# Patient Record
Sex: FEMALE | Race: WHITE | NOT HISPANIC OR LATINO | Employment: FULL TIME | URBAN - METROPOLITAN AREA
[De-identification: names, ages, dates, MRNs, and addresses within clinical notes are randomized per-mention and may not be internally consistent; named-entity substitution may affect disease eponyms.]

---

## 2017-01-02 ENCOUNTER — APPOINTMENT (OUTPATIENT)
Dept: INTERVENTIONAL RADIOLOGY/VASCULAR | Facility: CLINIC | Age: 42
End: 2017-01-02
Attending: INTERNAL MEDICINE
Payer: COMMERCIAL

## 2017-01-02 PROCEDURE — C1769 GUIDE WIRE: HCPCS

## 2017-01-02 PROCEDURE — 36561 INSERT TUNNELED CV CATH: CPT

## 2017-01-02 PROCEDURE — 76937 US GUIDE VASCULAR ACCESS: CPT

## 2017-01-02 PROCEDURE — 77001 FLUOROGUIDE FOR VEIN DEVICE: CPT

## 2017-01-02 PROCEDURE — C1788 PORT, INDWELLING, IMP: HCPCS

## 2017-01-02 PROCEDURE — 27210904 ZZH KIT CR6

## 2017-01-02 PROCEDURE — 27210820 ZZH WOUND GLUE CR3

## 2017-01-03 ENCOUNTER — TRANSFERRED RECORDS (OUTPATIENT)
Dept: HEALTH INFORMATION MANAGEMENT | Facility: CLINIC | Age: 42
End: 2017-01-03

## 2017-01-04 ENCOUNTER — TRANSFERRED RECORDS (OUTPATIENT)
Dept: HEALTH INFORMATION MANAGEMENT | Facility: CLINIC | Age: 42
End: 2017-01-04

## 2017-01-09 ENCOUNTER — TRANSFERRED RECORDS (OUTPATIENT)
Dept: HEALTH INFORMATION MANAGEMENT | Facility: CLINIC | Age: 42
End: 2017-01-09

## 2017-01-16 ENCOUNTER — TRANSFERRED RECORDS (OUTPATIENT)
Dept: HEALTH INFORMATION MANAGEMENT | Facility: CLINIC | Age: 42
End: 2017-01-16

## 2017-01-18 ENCOUNTER — TRANSFERRED RECORDS (OUTPATIENT)
Dept: HEALTH INFORMATION MANAGEMENT | Facility: CLINIC | Age: 42
End: 2017-01-18

## 2017-01-18 ENCOUNTER — HOSPITAL ENCOUNTER (OUTPATIENT)
Dept: CARDIOLOGY | Facility: CLINIC | Age: 42
Discharge: HOME | End: 2017-01-18
Attending: NURSE PRACTITIONER | Admitting: NURSE PRACTITIONER
Payer: COMMERCIAL

## 2017-01-18 DIAGNOSIS — R00.2 PALPITATIONS: Primary | ICD-10-CM

## 2017-01-18 DIAGNOSIS — R00.2 PALPITATIONS: ICD-10-CM

## 2017-01-18 PROCEDURE — 93010 ELECTROCARDIOGRAM REPORT: CPT | Performed by: INTERNAL MEDICINE

## 2017-01-18 PROCEDURE — 93005 ELECTROCARDIOGRAM TRACING: CPT

## 2017-01-18 PROCEDURE — 93005 ELECTROCARDIOGRAM TRACING: CPT | Performed by: INTERNAL MEDICINE

## 2017-01-19 LAB — INTERPRETATION ECG - MUSE: NORMAL

## 2017-01-23 ENCOUNTER — TRANSFERRED RECORDS (OUTPATIENT)
Dept: HEALTH INFORMATION MANAGEMENT | Facility: CLINIC | Age: 42
End: 2017-01-23

## 2017-01-30 ENCOUNTER — TRANSFERRED RECORDS (OUTPATIENT)
Dept: HEALTH INFORMATION MANAGEMENT | Facility: CLINIC | Age: 42
End: 2017-01-30

## 2017-02-06 ENCOUNTER — TRANSFERRED RECORDS (OUTPATIENT)
Dept: HEALTH INFORMATION MANAGEMENT | Facility: CLINIC | Age: 42
End: 2017-02-06

## 2017-02-11 ENCOUNTER — TRANSFERRED RECORDS (OUTPATIENT)
Dept: HEALTH INFORMATION MANAGEMENT | Facility: CLINIC | Age: 42
End: 2017-02-11

## 2017-02-13 ENCOUNTER — TRANSFERRED RECORDS (OUTPATIENT)
Dept: HEALTH INFORMATION MANAGEMENT | Facility: CLINIC | Age: 42
End: 2017-02-13

## 2017-02-27 ENCOUNTER — TRANSFERRED RECORDS (OUTPATIENT)
Dept: HEALTH INFORMATION MANAGEMENT | Facility: CLINIC | Age: 42
End: 2017-02-27

## 2017-03-08 ENCOUNTER — MEDICAL CORRESPONDENCE (OUTPATIENT)
Dept: HEALTH INFORMATION MANAGEMENT | Facility: CLINIC | Age: 42
End: 2017-03-08

## 2017-03-08 ENCOUNTER — TRANSFERRED RECORDS (OUTPATIENT)
Dept: HEALTH INFORMATION MANAGEMENT | Facility: CLINIC | Age: 42
End: 2017-03-08

## 2017-03-10 ENCOUNTER — TRANSFERRED RECORDS (OUTPATIENT)
Dept: HEALTH INFORMATION MANAGEMENT | Facility: CLINIC | Age: 42
End: 2017-03-10

## 2017-03-10 DIAGNOSIS — C20 RECTAL CANCER (H): Primary | ICD-10-CM

## 2017-03-15 ENCOUNTER — OFFICE VISIT (OUTPATIENT)
Dept: INTERNAL MEDICINE | Facility: CLINIC | Age: 42
End: 2017-03-15
Payer: COMMERCIAL

## 2017-03-15 VITALS
BODY MASS INDEX: 43.37 KG/M2 | WEIGHT: 269.9 LBS | HEIGHT: 66 IN | TEMPERATURE: 98.8 F | OXYGEN SATURATION: 99 % | HEART RATE: 98 BPM | SYSTOLIC BLOOD PRESSURE: 130 MMHG | DIASTOLIC BLOOD PRESSURE: 80 MMHG

## 2017-03-15 DIAGNOSIS — J45.20 MILD INTERMITTENT ASTHMA WITHOUT COMPLICATION: ICD-10-CM

## 2017-03-15 DIAGNOSIS — C20 MALIGNANT NEOPLASM OF RECTUM (H): ICD-10-CM

## 2017-03-15 DIAGNOSIS — F33.0 MAJOR DEPRESSIVE DISORDER, RECURRENT EPISODE, MILD (H): ICD-10-CM

## 2017-03-15 DIAGNOSIS — Z01.818 PREOP GENERAL PHYSICAL EXAM: Primary | ICD-10-CM

## 2017-03-15 LAB
APTT PPP: 24 SEC (ref 22–37)
HBA1C MFR BLD: 5.1 % (ref 4.3–6)
HGB BLD-MCNC: 11.7 G/DL (ref 11.7–15.7)
INR PPP: 0.9 (ref 0.86–1.14)
PLATELET # BLD AUTO: 322 10E9/L (ref 150–450)

## 2017-03-15 PROCEDURE — 83036 HEMOGLOBIN GLYCOSYLATED A1C: CPT | Performed by: INTERNAL MEDICINE

## 2017-03-15 PROCEDURE — 85730 THROMBOPLASTIN TIME PARTIAL: CPT | Performed by: INTERNAL MEDICINE

## 2017-03-15 PROCEDURE — 85610 PROTHROMBIN TIME: CPT | Performed by: INTERNAL MEDICINE

## 2017-03-15 PROCEDURE — 84132 ASSAY OF SERUM POTASSIUM: CPT | Performed by: INTERNAL MEDICINE

## 2017-03-15 PROCEDURE — 99215 OFFICE O/P EST HI 40 MIN: CPT | Performed by: INTERNAL MEDICINE

## 2017-03-15 PROCEDURE — 85018 HEMOGLOBIN: CPT | Performed by: INTERNAL MEDICINE

## 2017-03-15 PROCEDURE — 36415 COLL VENOUS BLD VENIPUNCTURE: CPT | Performed by: INTERNAL MEDICINE

## 2017-03-15 PROCEDURE — 82947 ASSAY GLUCOSE BLOOD QUANT: CPT | Performed by: INTERNAL MEDICINE

## 2017-03-15 PROCEDURE — 85049 AUTOMATED PLATELET COUNT: CPT | Performed by: INTERNAL MEDICINE

## 2017-03-15 NOTE — PROGRESS NOTES
[facility]  [address] [address]  [phone]  Dept: [phone]    PRE-OP EVALUATION:  Today's date: 3/15/2017    Yahaira Ramirez (: 1975) presents for pre-operative evaluation assessment as requested by Dr. Adkins.  She requires evaluation and anesthesia risk assessment prior to undergoing surgery/procedure for treatment of rectal carcinoma .  Proposed procedure: Davinci repair    Date of Surgery/ Procedure: 17  Time of Surgery/ Procedure: 7:30 am  Hospital/Surgical Facility: [facility]  Primary Physician: Guille Torre  Type of Anesthesia Anticipated: General    Patient has a Health Care Directive or Living Will:  NO    HPI:                                                      Brief HPI related to upcoming procedure: treatment of rectal carcinoma .  Proposed procedure: Davinci repair    Yahaira Ramirez is a 41 year old female here for preoperative assessment for treatment of rectal carcinoma .  Proposed procedure: Davinci repair    See problem list for active medical problems.  Problems all longstanding and stable, except as noted/documented.  See ROS for pertinent symptoms related to these conditions.                                                                                                  .    MEDICAL HISTORY:                                                      Patient Active Problem List    Diagnosis Date Noted     CARDIOVASCULAR SCREENING; LDL GOAL LESS THAN 160 10/31/2010     Priority: High     Mirena inserted : due for removal  or 2016     Priority: Medium     Lot # [id number]  Exp : 2018  NDC: 29258-080-59  Virginia Baezakulwant CMA         Hypothyroidism, unspecified hypothyroidism type 2016     Priority: Medium     Mild intermittent asthma without complication 2016     Priority: Medium     Major depressive disorder, recurrent episode, mild (H) 2016     Priority: Medium     Hypertrophy of breast  08/01/2006     Priority: Medium      Past Medical History   Diagnosis Date     ASCUS on Pap smear 1/23/03     + HPV     Asthma      ASTHMA - MILD INTERMITTENT 8/23/2005     Calculus of gallbladder without mention of cholecystitis or obstruction      CARDIOVASCULAR SCREENING; LDL GOAL LESS THAN 160 10/31/2010     History of colposcopy with cervical biopsy 2/20/03, 7/23/03     SIMONE I & III, SIMONE I     HUMAN PAPILLOMAVIRUS NOS 3/24/2003     61-low risk     HYPOTHYROIDISM NOS 9/10/2002     Mild major depression (H) 2/22/2011     Other postablative hypothyroidism      iatrogenically induced hypothyroidism after I-31 for hyperthyroidism   abstracted [id number]     Renal stones      HX of     Past Surgical History   Procedure Laterality Date     Hc cautery of cervix, cryocautery  3/23/03     Cholecystectomy       Excise mass upper extremity  12/21/2012     Procedure: EXCISE MASS UPPER EXTREMITY;  Excision Subcutaneous Mass Right Tricep;  Surgeon: Mehul Maldonado MD;  Location: [facility] OR     Mammoplasty reduction bilateral  7/3/2014     Procedure: MAMMOPLASTY REDUCTION BILATERAL;  Surgeon: Yoanna Mccabe MD;  Location:  [facility]     C iud,mirena  4/2016     Ent surgery  03/2016     Tonsilectomy     Colonoscopy Left 12/9/2016     Procedure: COMBINED COLONOSCOPY, SINGLE OR MULTIPLE BIOPSY/POLYPECTOMY BY BIOPSY;  Surgeon: Claudette De La Paz MD;  Location:  GI     Current Outpatient Prescriptions   Medication Sig Dispense Refill     EPIDUO 0.1-2.5 % gel APPLY TO AFFECTED AREA NIGHTLY AT BEDTIME.  4     buPROPion (WELLBUTRIN SR) 150 MG 12 hr tablet Take 1 tablet (150 mg) by mouth 2 times daily 180 tablet 3     levothyroxine (SYNTHROID, LEVOTHROID) 125 MCG tablet Take 1 tablet (125 mcg) by mouth daily Then on day 7 take 1 1/2 tablets. 90 tablet 3     mometasone-formoterol (DULERA) 100-5 MCG/ACT oral inhaler Inhale 2 puffs into the lungs 2 times daily 1 Inhaler 11     citalopram (CELEXA) 40 MG tablet Take 1 tablet (40 mg) by  "mouth daily 90 tablet 3     fluticasone (FLONASE) 50 MCG/ACT nasal spray Spray 1-2 sprays into both nostrils daily 30 g 5     IBUPROFEN PO Take 400 mg by mouth as needed.       levalbuterol (XOPENEX HFA) 45 MCG/ACT inhaler Inhale 1-2 puffs into the lungs every 4 hours as needed for shortness of breath / dyspnea. 1 Inhaler 3     OTC products: None, except as noted above    Allergies   Allergen Reactions     Cats      Pollen Extract      Sulfa Drugs Hives      Latex Allergy: NO    Social History   Substance Use Topics     Smoking status: Former Smoker     Quit date: 12/7/2006     Smokeless tobacco: Never Used     Alcohol use No      Comment: 1-2 drinks every 3 months     History   Drug Use No       REVIEW OF SYSTEMS:                                                    C: NEGATIVE for fever, chills, change in weight  E/M: NEGATIVE for ear, mouth and throat problems  R: NEGATIVE for significant cough or SOB  CV: NEGATIVE for chest pain, palpitations or peripheral edema  GI: NEGATIVE for nausea, abdominal pain, heartburn, or change in bowel habits  : NEGATIVE for frequency, dysuria, or hematuria  M: NEGATIVE for significant arthralgias or myalgia  N: NEGATIVE for weakness, dizziness or paresthesias  H: NEGATIVE for bleeding problems  P: NEGATIVE for changes in mood or affect    EXAM:                                                    /80  Pulse 98  Temp 98.8  F (37.1  C) (Oral)  Ht 5' 6\" (1.676 m)  Wt 269 lb 14.4 oz (122.4 kg)  SpO2 99%  BMI 43.56 kg/m2    GENERAL APPEARANCE: healthy, alert and no distress     EYES: EOMI, PERRL     HENT: ear canals and TM's normal and nose and mouth without ulcers or lesions     NECK: no adenopathy, no asymmetry, masses, or scars and thyroid normal to palpation     RESP: lungs clear to auscultation - no rales, rhonchi or wheezes     CV: regular rates and rhythm, normal S1 S2, no S3 or S4 and no murmur, click or rub     ABDOMEN:  Obese, soft, nontender, no HSM or masses and bowel " sounds normal, obese     PSYCH: mentation appears normal and affect normal/bright    DIAGNOSTICS:                                                      EKG: Not repeated, see copy 1/18/2017, due to non-vascular surgery and age <65 and without cardiac risk factors  Labs Drawn and in Process:   Unresulted Labs Ordered in the Past 30 Days of this Admission     No orders found from 1/14/2017 to 3/16/2017.          Recent Labs   Lab Test  01/02/17   0827  10/28/16   0910  04/01/16   1406   HGB  12.1  13.1  12.9   PLT  391  365  421   INR  0.89   --    --    NA   --   139  141   POTASSIUM   --   4.6  4.2   CR   --   0.73  0.76        IMPRESSION:                                                    Reason for surgery/procedure: treatment of rectal carcinoma .  Proposed procedure: Davinci repair    The proposed surgical procedure is considered mild risk.    REVISED CARDIAC RISK INDEX  The patient has the following serious cardiovascular risks for perioperative complications such as (MI, PE, VFib and 3  AV Block):  No serious cardiac risks  INTERPRETATION: 1 risks: Class II (low risk - 0.9% complication rate)    The patient has the following additional risks for perioperative complications:  No identified additional risks      ICD-10-CM    1. Preop general physical exam Z01.818 Potassium     Glucose     Hemoglobin     Platelet count     INR     Partial thromboplastin time     Hemoglobin A1c   2. Malignant neoplasm of rectum (H) C20    3. Mild intermittent asthma without complication J45.20    4. Major depressive disorder, recurrent episode, mild (H) F33.0      (Z01.818) Preop general physical exam  (primary encounter diagnosis)  Comment: as ordered  Plan: Potassium, Glucose, Hemoglobin, Platelet count,        INR, Partial thromboplastin time, Hemoglobin         A1c            (C20) Malignant neoplasm of rectum (H)  Comment: surgery as scheduled  Plan:     (J45.20) Mild intermittent asthma without complication  Comment: stable  per ACT  Plan:     (F33.0) Major depressive disorder, recurrent episode, mild (H)  Comment: stable per PHQ9  Plan:         RECOMMENDATIONS:                                                      --Consult hospital rounder / IM to assist post-op medical management    Pulmonary Risk  Incentive spirometry post op  Maximize Asthma treatment inhalers as ordered     --Patient is to take all scheduled medications on the day of surgery EXCEPT for modifications listed below.    Anticoagulant or Antiplatelet Medication Use  ASPIRIN/NSAIDS: Discontinue 7-10 days prior to procedure to reduce bleeding risk.  It should be resumed post-operatively.      APPROVAL GIVEN to proceed with proposed procedure, without further diagnostic evaluation     Signed Electronically by: Guille Torre MD    Copy of this evaluation report is provided to requesting physician, Dr. Lucille Vicente Preop Guidelines

## 2017-03-15 NOTE — NURSING NOTE
"Chief Complaint   Patient presents with     Pre-Op Exam       Initial /90 (BP Location: Left arm, Patient Position: Chair, Cuff Size: Adult Large)  Pulse 106  Temp 98.8  F (37.1  C) (Oral)  Ht 5' 6\" (1.676 m)  Wt 269 lb 14.4 oz (122.4 kg)  SpO2 99%  BMI 43.56 kg/m2 Estimated body mass index is 43.56 kg/(m^2) as calculated from the following:    Height as of this encounter: 5' 6\" (1.676 m).    Weight as of this encounter: 269 lb 14.4 oz (122.4 kg).  Medication Reconciliation: complete   Estefany Erickson CMA      "

## 2017-03-16 ASSESSMENT — PATIENT HEALTH QUESTIONNAIRE - PHQ9: SUM OF ALL RESPONSES TO PHQ QUESTIONS 1-9: 2

## 2017-03-16 ASSESSMENT — ASTHMA QUESTIONNAIRES: ACT_TOTALSCORE: 25

## 2017-03-17 LAB
GLUCOSE SERPL-MCNC: 78 MG/DL (ref 70–99)
POTASSIUM SERPL-SCNC: 4.1 MMOL/L (ref 3.4–5.3)

## 2017-03-20 ENCOUNTER — TELEPHONE (OUTPATIENT)
Dept: WOUND CARE | Facility: CLINIC | Age: 42
End: 2017-03-20

## 2017-03-21 ENCOUNTER — SURGERY (OUTPATIENT)
Age: 42
End: 2017-03-21

## 2017-03-21 ENCOUNTER — HOSPITAL ENCOUNTER (OUTPATIENT)
Facility: CLINIC | Age: 42
Discharge: HOME | End: 2017-03-21
Attending: COLON & RECTAL SURGERY | Admitting: COLON & RECTAL SURGERY
Payer: COMMERCIAL

## 2017-03-21 VITALS
SYSTOLIC BLOOD PRESSURE: 135 MMHG | OXYGEN SATURATION: 96 % | DIASTOLIC BLOOD PRESSURE: 86 MMHG | RESPIRATION RATE: 16 BRPM

## 2017-03-21 LAB — FLEXIBLE SIGMOIDOSCOPY: NORMAL

## 2017-03-21 PROCEDURE — 45330 DIAGNOSTIC SIGMOIDOSCOPY: CPT | Performed by: COLON & RECTAL SURGERY

## 2017-03-21 RX ORDER — NALOXONE HYDROCHLORIDE 0.4 MG/ML
.1-.4 INJECTION, SOLUTION INTRAMUSCULAR; INTRAVENOUS; SUBCUTANEOUS
Status: DISCONTINUED | OUTPATIENT
Start: 2017-03-21 | End: 2017-03-21 | Stop reason: HOSPADM

## 2017-03-21 RX ORDER — DIPHENHYDRAMINE HCL 25 MG
25 CAPSULE ORAL EVERY 4 HOURS PRN
Status: DISCONTINUED | OUTPATIENT
Start: 2017-03-21 | End: 2017-03-21 | Stop reason: HOSPADM

## 2017-03-21 RX ORDER — DIPHENHYDRAMINE HYDROCHLORIDE 50 MG/ML
25 INJECTION INTRAMUSCULAR; INTRAVENOUS EVERY 4 HOURS PRN
Status: DISCONTINUED | OUTPATIENT
Start: 2017-03-21 | End: 2017-03-21 | Stop reason: HOSPADM

## 2017-03-21 RX ORDER — PROCHLORPERAZINE MALEATE 5 MG
5-10 TABLET ORAL EVERY 6 HOURS PRN
Status: DISCONTINUED | OUTPATIENT
Start: 2017-03-21 | End: 2017-03-21 | Stop reason: HOSPADM

## 2017-03-21 RX ORDER — ONDANSETRON 2 MG/ML
4 INJECTION INTRAMUSCULAR; INTRAVENOUS EVERY 6 HOURS PRN
Status: DISCONTINUED | OUTPATIENT
Start: 2017-03-21 | End: 2017-03-21 | Stop reason: HOSPADM

## 2017-03-21 RX ORDER — ONDANSETRON 2 MG/ML
4 INJECTION INTRAMUSCULAR; INTRAVENOUS
Status: DISCONTINUED | OUTPATIENT
Start: 2017-03-21 | End: 2017-03-21 | Stop reason: HOSPADM

## 2017-03-21 RX ORDER — LIDOCAINE 40 MG/G
CREAM TOPICAL
Status: DISCONTINUED | OUTPATIENT
Start: 2017-03-21 | End: 2017-03-21 | Stop reason: HOSPADM

## 2017-03-21 RX ORDER — FLUMAZENIL 0.1 MG/ML
0.2 INJECTION, SOLUTION INTRAVENOUS
Status: DISCONTINUED | OUTPATIENT
Start: 2017-03-21 | End: 2017-03-21 | Stop reason: HOSPADM

## 2017-03-21 RX ORDER — ONDANSETRON 4 MG/1
4 TABLET, ORALLY DISINTEGRATING ORAL EVERY 6 HOURS PRN
Status: DISCONTINUED | OUTPATIENT
Start: 2017-03-21 | End: 2017-03-21 | Stop reason: HOSPADM

## 2017-03-27 ENCOUNTER — OFFICE VISIT (OUTPATIENT)
Dept: FAMILY MEDICINE | Facility: CLINIC | Age: 42
End: 2017-03-27
Payer: COMMERCIAL

## 2017-03-27 VITALS
HEIGHT: 66 IN | WEIGHT: 272 LBS | TEMPERATURE: 99 F | DIASTOLIC BLOOD PRESSURE: 84 MMHG | SYSTOLIC BLOOD PRESSURE: 126 MMHG | HEART RATE: 87 BPM | OXYGEN SATURATION: 97 % | BODY MASS INDEX: 43.71 KG/M2

## 2017-03-27 DIAGNOSIS — E03.9 HYPOTHYROIDISM, UNSPECIFIED TYPE: Primary | ICD-10-CM

## 2017-03-27 DIAGNOSIS — J01.90 ACUTE SINUSITIS WITH SYMPTOMS > 10 DAYS: ICD-10-CM

## 2017-03-27 DIAGNOSIS — F33.0 MAJOR DEPRESSIVE DISORDER, RECURRENT EPISODE, MILD (H): ICD-10-CM

## 2017-03-27 PROCEDURE — 99214 OFFICE O/P EST MOD 30 MIN: CPT | Performed by: FAMILY MEDICINE

## 2017-03-27 RX ORDER — CITALOPRAM HYDROBROMIDE 40 MG/1
40 TABLET ORAL DAILY
Qty: 30 TABLET | Refills: 0 | Status: SHIPPED | OUTPATIENT
Start: 2017-03-27 | End: 2017-04-18

## 2017-03-27 RX ORDER — FLUTICASONE PROPIONATE 50 MCG
2 SPRAY, SUSPENSION (ML) NASAL DAILY
Qty: 16 G | Refills: 0 | Status: ON HOLD | OUTPATIENT
Start: 2017-03-27 | End: 2017-04-05

## 2017-03-27 RX ORDER — LEVOTHYROXINE SODIUM 125 UG/1
125 TABLET ORAL DAILY
Qty: 45 TABLET | Refills: 0 | Status: SHIPPED | OUTPATIENT
Start: 2017-03-27 | End: 2017-03-29

## 2017-03-27 ASSESSMENT — ANXIETY QUESTIONNAIRES
3. WORRYING TOO MUCH ABOUT DIFFERENT THINGS: NOT AT ALL
5. BEING SO RESTLESS THAT IT IS HARD TO SIT STILL: NOT AT ALL
IF YOU CHECKED OFF ANY PROBLEMS ON THIS QUESTIONNAIRE, HOW DIFFICULT HAVE THESE PROBLEMS MADE IT FOR YOU TO DO YOUR WORK, TAKE CARE OF THINGS AT HOME, OR GET ALONG WITH OTHER PEOPLE: NOT DIFFICULT AT ALL
1. FEELING NERVOUS, ANXIOUS, OR ON EDGE: SEVERAL DAYS
7. FEELING AFRAID AS IF SOMETHING AWFUL MIGHT HAPPEN: NOT AT ALL
2. NOT BEING ABLE TO STOP OR CONTROL WORRYING: NOT AT ALL
6. BECOMING EASILY ANNOYED OR IRRITABLE: NOT AT ALL
GAD7 TOTAL SCORE: 1

## 2017-03-27 ASSESSMENT — PATIENT HEALTH QUESTIONNAIRE - PHQ9: 5. POOR APPETITE OR OVEREATING: NOT AT ALL

## 2017-03-27 NOTE — PROGRESS NOTES
SUBJECTIVE:                                                    Yahaira Ramirez is a 41 year old female who presents to clinic today for the following health issues:      Acute Illness   Acute illness concerns: Cough  Onset: 7-10 days    Fever: no     Chills/Sweats: no     Headache (location?): no     Sinus Pressure:YES    Conjunctivitis:  no    Ear Pain: no    Rhinorrhea: YES    Congestion: YES    Sore Throat: no      Cough: YES    Wheeze: YES    Decreased Appetite: no     Nausea: no     Vomiting: no     Diarrhea:  no     Dysuria/Freq.: no     Fatigue/Achiness: YES    Sick/Strep Exposure: no      Therapies Tried and outcome: Tylenol        Asthma Follow-Up    Was ACT completed today?    Yes    ACT Total Scores 3/15/2017   ACT TOTAL SCORE (Goal Greater than or Equal to 20) 25   In the past 12 months, how many times did you visit the emergency room for your asthma without being admitted to the hospital? 0   In the past 12 months, how many times were you hospitalized overnight because of your asthma? 0       Patient has history of depression. Symptoms are well controlled. Needs refill on Celexa.  Also has history of hypothyroidism. Denies any symptoms. Needs a refill on Synthroid.    Patient has an upcoming colon cancer surgery next week. She is worried that in the past she has had bronchitis after a sinus infection.    Problem list and histories reviewed & adjusted, as indicated.  Additional history: as documented    Patient Active Problem List   Diagnosis     Hypertrophy of breast     CARDIOVASCULAR SCREENING; LDL GOAL LESS THAN 160     Hypothyroidism, unspecified hypothyroidism type     Mild intermittent asthma without complication     Major depressive disorder, recurrent episode, mild (H)     Mirena inserted : due for removal 2019 or 2021     Past Surgical History:   Procedure Laterality Date     C IUD,MIRENA  4/2016     CHOLECYSTECTOMY       COLONOSCOPY Left 12/9/2016    Procedure: COMBINED COLONOSCOPY, SINGLE  OR MULTIPLE BIOPSY/POLYPECTOMY BY BIOPSY;  Surgeon: Claudette De La Paz MD;  Location:  GI     ENT SURGERY  2016    Tonsilectomy     EXCISE MASS UPPER EXTREMITY  2012    Procedure: EXCISE MASS UPPER EXTREMITY;  Excision Subcutaneous Mass Right Tricep;  Surgeon: Mehul Maldonado MD;  Location:  OR     EYE SURGERY      Lasik     HC CAUTERY OF CERVIX, CRYOCAUTERY  3/23/03     MAMMOPLASTY REDUCTION BILATERAL  7/3/2014    Procedure: MAMMOPLASTY REDUCTION BILATERAL;  Surgeon: Yoanna Mccabe MD;  [facility]:  [facility]     SIGMOIDOSCOPY FLEXIBLE N/A 3/21/2017    Procedure: SIGMOIDOSCOPY FLEXIBLE;  Surgeon: Yair Adkins MD;  Location:  GI       Social History   Substance Use Topics     Smoking status: Former Smoker     Packs/day: 1.00     Years: 10.00     Types: Cigarettes     Quit date: 2006     Smokeless tobacco: Never Used     Alcohol use No      Comment: 1-2 drinks every 3 months     Family History   Problem Relation Age of Onset     CANCER Maternal Grandfather      -lung     CANCER Paternal Grandfather           Hypertension Father      DIABETES Father 60     type 2     Lipids Maternal Grandmother      CANCER Paternal Grandmother      pancreatic     Hypertension Mother      Colon Cancer No family hx of          Current Outpatient Prescriptions   Medication Sig Dispense Refill     levothyroxine (SYNTHROID/LEVOTHROID) 125 MCG tablet Take 1 tablet (125 mcg) by mouth daily Then on day 7 take 1 1/2 tablets. 45 tablet 0     citalopram (CELEXA) 40 MG tablet Take 1 tablet (40 mg) by mouth daily 30 tablet 0     amoxicillin-clavulanate (AUGMENTIN) 875-125 MG per tablet Take 1 tablet by mouth 2 times daily for 7 days 14 tablet 0     fluticasone (FLONASE) 50 MCG/ACT spray Spray 2 sprays into both nostrils daily 16 g 0     buPROPion (WELLBUTRIN SR) 150 MG 12 hr tablet Take 1 tablet (150 mg) by mouth 2 times daily 180 tablet 3     mometasone-formoterol (DULERA) 100-5 MCG/ACT oral inhaler  "Inhale 2 puffs into the lungs 2 times daily 1 Inhaler 11     levalbuterol (XOPENEX HFA) 45 MCG/ACT inhaler Inhale 1-2 puffs into the lungs every 4 hours as needed for shortness of breath / dyspnea. 1 Inhaler 3     [DISCONTINUED] levothyroxine (SYNTHROID, LEVOTHROID) 125 MCG tablet Take 1 tablet (125 mcg) by mouth daily Then on day 7 take 1 1/2 tablets. 90 tablet 3     [DISCONTINUED] citalopram (CELEXA) 40 MG tablet Take 1 tablet (40 mg) by mouth daily 90 tablet 3     Allergies   Allergen Reactions     Cats      Pollen Extract      Sulfa Drugs Hives       Reviewed and updated as needed this visit by clinical staff       Reviewed and updated as needed this visit by Provider         ROS:  C: NEGATIVE for fever, chills, change in weight  INTEGUMENTARY/SKIN: NEGATIVE for worrisome rashes, moles or lesions      OBJECTIVE:                                                    /84 (BP Location: Left arm, Patient Position: Chair, Cuff Size: Adult Large)  Pulse 87  Temp 99  F (37.2  C) (Tympanic)  Ht 5' 6\" (1.676 m)  Wt 272 lb (123.4 kg)  SpO2 97%  BMI 43.9 kg/m2  Body mass index is 43.9 kg/(m^2).   GENERAL: healthy, alert, well nourished, well hydrated, no distress  EYES: Eyes grossly normal to inspection, extraocular movements - intact, and PERRL  HENT: ear canals- normal; TMs- normal; Nose- congested b/l . B/l sinus tenderness.  Mouth- no ulcers, no lesions  NECK: no tenderness, no adenopathy, no asymmetry, no masses, no stiffness; thyroid- normal to palpation  RESP: lungs clear to auscultation - no rales, no rhonchi, no wheezes  CV: regular rates and rhythm, normal S1 S2, no S3 or S4 and no murmur, no click or rub -         ASSESSMENT/PLAN:                                                      1. Major depressive disorder, recurrent episode, mild (H)  Well controlled. Refilled medication for 30 days. Instructed to contact PCP for further refills  - citalopram (CELEXA) 40 MG tablet; Take 1 tablet (40 mg) by mouth " daily  Dispense: 30 tablet; Refill: 0    2. Hypothyroidism, unspecified type  Questionable control. Refilled the medication for 30 days. Instructed the patient to contact PCP for further refills  - levothyroxine (SYNTHROID/LEVOTHROID) 125 MCG tablet; Take 1 tablet (125 mcg) by mouth daily Then on day 7 take 1 1/2 tablets.  Dispense: 45 tablet; Refill: 0    3. Acute sinusitis with symptoms > 10 days    Starting the patient on Augmentin for acute sinus infection. Recommended to use Flonase  Stay well hydrated. May use Tylenol for pain if needed. If symptoms are worsening or no improvement noted, instructed to notify PCP and surgeon.   - amoxicillin-clavulanate (AUGMENTIN) 875-125 MG per tablet; Take 1 tablet by mouth 2 times daily for 7 days  Dispense: 14 tablet; Refill: 0  - fluticasone (FLONASE) 50 MCG/ACT spray; Spray 2 sprays into both nostrils daily  Dispense: 16 g; Refill: 0    4- Asthma - well controlled.       Follow up with Provider - as needed     Marty Osei MD  [facility]

## 2017-03-27 NOTE — NURSING NOTE
"Chief Complaint   Patient presents with     Cough       Initial /84 (BP Location: Left arm, Patient Position: Chair, Cuff Size: Adult Large)  Pulse 87  Temp 99  F (37.2  C) (Tympanic)  Ht 5' 6\" (1.676 m)  Wt 272 lb (123.4 kg)  SpO2 97%  BMI 43.9 kg/m2 Estimated body mass index is 43.9 kg/(m^2) as calculated from the following:    Height as of this encounter: 5' 6\" (1.676 m).    Weight as of this encounter: 272 lb (123.4 kg).  Medication Reconciliation: complete  "

## 2017-03-27 NOTE — MR AVS SNAPSHOT
After Visit Summary   3/27/2017    Yahaira Ramirez    MRN: [id number]           Patient Information     Date Of Birth          1975        Visit Information        Provider Department      3/27/2017 11:00 AM Marty Osei MD [facility] [place]        Today's Diagnoses     Hypothyroidism, unspecified type    -  1    Major depressive disorder, recurrent episode, mild (H)        Acute sinusitis with symptoms > 10 days           Follow-ups after your visit        Your next 10 appointments already scheduled     Mar 31, 2017  8:00 AM CDT   Consult HOD with Sh Ostomy Wound Room 743, RN   [facility] ([facility])    [address]  [facility] [address] [phone]            Apr 03, 2017 10:45 AM CDT   MA SCREENING DIGITAL BILATERAL with [id number]   [facility] ([facility])    [address] [facility], [address]  [facility] [address] [phone]           Do not use any powder, lotion or deodorant under your arms or on your breast. If you do, we will ask you to remove it before your exam.  Wear comfortable, two-piece clothing.  If you have any allergies, tell your care team.  Bring any previous mammograms from other facilities or have them mailed to the breast center. This mammogram location, [facility], now offers 3D mammography. It doesn't replace a screening mammogram and can be done with a regular screening mammogram. It is optional and not all insurances will pay for it. 3D mammography is a special kind of mammogram that produces a three-dimensional image of the breast by using low dose-xrays. 3D allows the radiologist to see the breast tissue differently from 2D, which reduces the chance of repeat testing due to overlapping breast tissue. If you are interested in have a 3D mammogram, please check with your insurance before you arrive for your exam. On the day of your exam you will be asked if  "you would like 3D imaging.            Apr 05, 2017   Procedure with Yair Adkins MD   [facility] (--)    [address]  [place] [address] [phone]              Who to contact     If you have questions or need follow up information about today's clinic visit or your schedule please contact [facility] directly at [phone].  Normal or non-critical lab and imaging results will be communicated to you by [employer], letter or phone within 4 business days after the clinic has received the results. If you do not hear from us within 7 days, please contact the clinic through [employer] or phone. If you have a critical or abnormal lab result, we will notify you by phone as soon as possible.  Submit refill requests through [employer] or call your pharmacy and they will forward the refill request to us. Please allow 3 business days for your refill to be completed.          Additional Information About Your Visit        [employer] Information     [employer] gives you secure access to your electronic health record. If you see a primary care provider, you can also send messages to your care team and make appointments. If you have questions, please call your primary care clinic.  If you do not have a primary care provider, please call [phone] and they will assist you.        Care EveryWhere ID     This is your Care EveryWhere ID. This could be used by other organizations to access your [place] medical records  [id number]        Your Vitals Were     Pulse Temperature Height Pulse Oximetry BMI (Body Mass Index)       87 99  F (37.2  C) (Tympanic) 5' 6\" (1.676 m) 97% 43.9 kg/m2        Blood Pressure from Last 3 Encounters:   03/27/17 126/84   03/21/17 135/86   03/15/17 130/80    Weight from Last 3 Encounters:   03/27/17 272 lb (123.4 kg)   03/15/17 269 lb 14.4 oz (122.4 kg)   12/09/16 257 lb (116.6 kg)              Today, you had the following     No orders found for " display         Today's Medication Changes          These changes are accurate as of: 3/27/17 11:21 AM.  If you have any questions, ask your nurse or doctor.               Start taking these medicines.        Dose/Directions    amoxicillin-clavulanate 875-125 MG per tablet   Commonly known as:  AUGMENTIN   Used for:  Acute sinusitis with symptoms > 10 days   Started by:  Marty Osei MD        Dose:  1 tablet   Take 1 tablet by mouth 2 times daily for 7 days   Quantity:  14 tablet   Refills:  0         These medicines have changed or have updated prescriptions.        Dose/Directions    fluticasone 50 MCG/ACT spray   Commonly known as:  FLONASE   This may have changed:  how much to take   Used for:  Acute sinusitis with symptoms > 10 days   Changed by:  Marty Osei MD        Dose:  2 spray   Spray 2 sprays into both nostrils daily   Quantity:  16 g   Refills:  0         Stop taking these medicines if you haven't already. Please contact your care team if you have questions.     IBUPROFEN PO   Stopped by:  Marty Osei MD                Where to get your medicines      These medications were sent to [address] [address]     Phone:  [phone]     amoxicillin-clavulanate 875-125 MG per tablet    citalopram 40 MG tablet    fluticasone 50 MCG/ACT spray    levothyroxine 125 MCG tablet                Primary Care Provider Office Phone # Fax #    Guille Torre [phone]       [facility] [address] [facility] [address]        Thank you!     Thank you for choosing [facility]  for your care. Our goal is always to provide you with excellent care. Hearing back from our patients is one way we can continue to improve our services. Please take a few minutes to complete the written survey that you may receive in the mail after your visit with us. Thank you!             Your Updated Medication  List - Protect others around you: Learn how to safely use, store and throw away your medicines at www.disposemymeds.org.          This list is accurate as of: 3/27/17 11:21 AM.  Always use your most recent med list.                   Brand Name Dispense Instructions for use    amoxicillin-clavulanate 875-125 MG per tablet    AUGMENTIN    14 tablet    Take 1 tablet by mouth 2 times daily for 7 days       buPROPion 150 MG 12 hr tablet    WELLBUTRIN SR    180 tablet    Take 1 tablet (150 mg) by mouth 2 times daily       citalopram 40 MG tablet    celeXA    30 tablet    Take 1 tablet (40 mg) by mouth daily       fluticasone 50 MCG/ACT spray    FLONASE    16 g    Spray 2 sprays into both nostrils daily       levalbuterol 45 MCG/ACT Inhaler    XOPENEX HFA    1 Inhaler    Inhale 1-2 puffs into the lungs every 4 hours as needed for shortness of breath / dyspnea.       levothyroxine 125 MCG tablet    SYNTHROID/LEVOTHROID    45 tablet    Take 1 tablet (125 mcg) by mouth daily Then on day 7 take 1 1/2 tablets.       mometasone-formoterol 100-5 MCG/ACT oral inhaler    DULERA    1 Inhaler    Inhale 2 puffs into the lungs 2 times daily

## 2017-03-28 ASSESSMENT — PATIENT HEALTH QUESTIONNAIRE - PHQ9: SUM OF ALL RESPONSES TO PHQ QUESTIONS 1-9: 1

## 2017-03-28 ASSESSMENT — ANXIETY QUESTIONNAIRES: GAD7 TOTAL SCORE: 1

## 2017-03-28 ASSESSMENT — ASTHMA QUESTIONNAIRES: ACT_TOTALSCORE: 23

## 2017-03-31 ENCOUNTER — HOSPITAL ENCOUNTER (OUTPATIENT)
Dept: WOUND CARE | Facility: CLINIC | Age: 42
End: 2017-03-31
Attending: INTERNAL MEDICINE
Payer: COMMERCIAL

## 2017-03-31 PROCEDURE — 40000903 ZZH STATISTIC WOC PT EDUCATION, 31-45 MIN

## 2017-03-31 NOTE — PROGRESS NOTES
"[facility] Nurse Ostomy Marking and Education         Data:   History:  Pt with hx of new rectal CA.  Pt has completed course of chemo/radiation. Pt is an     Pt referred by Dr. Swan  Who is present for marking and education: patient only  Type of ostomy surgery: LAR with diverting loop ileostomy  Abdomen:  Rounded, soft with deep crease that transverses abd wall @ level of umbilicus.  Pt has rounded upper panus that falls downward          When pt sit to cover upper aspect of LLQ and RLQ. Lower panus falls downward/inward when pt stands.           Intervention:     Patient's chart evaluated.      Assessments done today:  Belt line, previous abdominal surgeries and scars, abdominal muscles and pt observed lying, sitting and standing    Education: Reviewed upcoming surgery, stoma construction, post-op expectations, general ileostomy cares/concerns including:  diet and hydration, bathing, odor, output,  activity, appliances and emptying.      Patient marked with \"x\" using surgical marker and scratch method then allowed to dry 3 minutes and sealed with 3M No Sting Skin Prep. Pt marked          Mid RLQ that does allow for adequate visualization and surrounding pouching surface          Assessment:       Learning needs/ comprehension: Ready for OR         Plan:     Plan:   ? Surgery scheduled for:  4-5-17 @ 7:30am    Will plan to follow patient post operatively    Pt given written booklet and sample pouches     EDU stat only: 30 minutes    "

## 2017-04-03 ENCOUNTER — HOSPITAL ENCOUNTER (OUTPATIENT)
Dept: MAMMOGRAPHY | Facility: CLINIC | Age: 42
Discharge: HOME | End: 2017-04-03
Attending: OBSTETRICS & GYNECOLOGY | Admitting: OBSTETRICS & GYNECOLOGY
Payer: COMMERCIAL

## 2017-04-03 DIAGNOSIS — Z12.31 ENCOUNTER FOR SCREENING MAMMOGRAM FOR HIGH-RISK PATIENT: ICD-10-CM

## 2017-04-03 DIAGNOSIS — J45.20 MILD INTERMITTENT ASTHMA WITHOUT COMPLICATION: ICD-10-CM

## 2017-04-03 PROCEDURE — G0202 SCR MAMMO BI INCL CAD: HCPCS

## 2017-04-03 NOTE — TELEPHONE ENCOUNTER
mometasone-formoterol (DULERA) 100-5 MCG/ACT oral inhaler       Last Written Prescription Date: 3/7/16  Last Fill Quantity: 1, # refills: 11    Last Office Visit with [facility], P or [facility] prescribing provider:  3/27/17   Future Office Visit:       Date of Last Asthma Action Plan Letter:   Asthma Action Plan Q1 Year    Topic Date Due     Asthma Action Plan - yearly  10/28/2017      Asthma Control Test:   ACT Total Scores 3/27/2017   ACT TOTAL SCORE (Goal Greater than or Equal to 20) 23   In the past 12 months, how many times did you visit the emergency room for your asthma without being admitted to the hospital? 0   In the past 12 months, how many times were you hospitalized overnight because of your asthma? 0       Date of Last Spirometry Test:   No results found for this or any previous visit.

## 2017-04-04 RX ORDER — MOMETASONE FUROATE AND FORMOTEROL FUMARATE DIHYDRATE 100; 5 UG/1; UG/1
AEROSOL RESPIRATORY (INHALATION)
Qty: 3 INHALER | Refills: 1 | Status: ON HOLD | OUTPATIENT
Start: 2017-04-04 | End: 2017-04-05

## 2017-04-05 ENCOUNTER — ANESTHESIA EVENT (OUTPATIENT)
Dept: SURGERY | Facility: CLINIC | Age: 42
DRG: 330 | End: 2017-04-05
Payer: COMMERCIAL

## 2017-04-05 ENCOUNTER — HOSPITAL ENCOUNTER (INPATIENT)
Facility: CLINIC | Age: 42
LOS: 5 days | Discharge: HOME | DRG: 330 | End: 2017-04-10
Attending: COLON & RECTAL SURGERY | Admitting: COLON & RECTAL SURGERY
Payer: COMMERCIAL

## 2017-04-05 ENCOUNTER — ANESTHESIA (OUTPATIENT)
Dept: SURGERY | Facility: CLINIC | Age: 42
DRG: 330 | End: 2017-04-05
Payer: COMMERCIAL

## 2017-04-05 DIAGNOSIS — C20 RECTAL CANCER (H): Primary | ICD-10-CM

## 2017-04-05 LAB
CREAT SERPL-MCNC: 0.82 MG/DL (ref 0.52–1.04)
GFR SERPL CREATININE-BSD FRML MDRD: 76 ML/MIN/1.7M2
HCG SERPL QL: NEGATIVE
PLATELET # BLD AUTO: 283 10E9/L (ref 150–450)

## 2017-04-05 PROCEDURE — 25000128 H RX IP 250 OP 636: Performed by: NURSE ANESTHETIST, CERTIFIED REGISTERED

## 2017-04-05 PROCEDURE — 88304 TISSUE EXAM BY PATHOLOGIST: CPT | Performed by: COLON & RECTAL SURGERY

## 2017-04-05 PROCEDURE — 36415 COLL VENOUS BLD VENIPUNCTURE: CPT | Performed by: SURGERY

## 2017-04-05 PROCEDURE — 0DBN0ZZ EXCISION OF SIGMOID COLON, OPEN APPROACH: ICD-10-PCS | Performed by: COLON & RECTAL SURGERY

## 2017-04-05 PROCEDURE — 25000128 H RX IP 250 OP 636: Performed by: COLON & RECTAL SURGERY

## 2017-04-05 PROCEDURE — 12000007 ZZH R&B INTERMEDIATE

## 2017-04-05 PROCEDURE — 0D1B0Z4 BYPASS ILEUM TO CUTANEOUS, OPEN APPROACH: ICD-10-PCS | Performed by: COLON & RECTAL SURGERY

## 2017-04-05 PROCEDURE — 8E0W4CZ ROBOTIC ASSISTED PROCEDURE OF TRUNK REGION, PERCUTANEOUS ENDOSCOPIC APPROACH: ICD-10-PCS | Performed by: COLON & RECTAL SURGERY

## 2017-04-05 PROCEDURE — S0020 INJECTION, BUPIVICAINE HYDRO: HCPCS | Performed by: COLON & RECTAL SURGERY

## 2017-04-05 PROCEDURE — 27210794 ZZH OR GENERAL SUPPLY STERILE: Performed by: COLON & RECTAL SURGERY

## 2017-04-05 PROCEDURE — 25800025 ZZH RX 258: Performed by: NURSE ANESTHETIST, CERTIFIED REGISTERED

## 2017-04-05 PROCEDURE — 36000085 ZZH SURGERY LEVEL 8 1ST 30 MIN: Performed by: COLON & RECTAL SURGERY

## 2017-04-05 PROCEDURE — 37000009 ZZH ANESTHESIA TECHNICAL FEE, EACH ADDTL 15 MIN: Performed by: COLON & RECTAL SURGERY

## 2017-04-05 PROCEDURE — 88309 TISSUE EXAM BY PATHOLOGIST: CPT | Performed by: COLON & RECTAL SURGERY

## 2017-04-05 PROCEDURE — 85049 AUTOMATED PLATELET COUNT: CPT | Performed by: COLON & RECTAL SURGERY

## 2017-04-05 PROCEDURE — 27210995 ZZH RX 272: Performed by: COLON & RECTAL SURGERY

## 2017-04-05 PROCEDURE — 0DBP0ZZ EXCISION OF RECTUM, OPEN APPROACH: ICD-10-PCS | Performed by: COLON & RECTAL SURGERY

## 2017-04-05 PROCEDURE — 71000012 ZZH RECOVERY PHASE 1 LEVEL 1 FIRST HR: Performed by: COLON & RECTAL SURGERY

## 2017-04-05 PROCEDURE — 25000125 ZZHC RX 250: Performed by: NURSE ANESTHETIST, CERTIFIED REGISTERED

## 2017-04-05 PROCEDURE — 36000087 ZZH SURGERY LEVEL 8 EA 15 ADDTL MIN: Performed by: COLON & RECTAL SURGERY

## 2017-04-05 PROCEDURE — 88304 TISSUE EXAM BY PATHOLOGIST: CPT | Mod: 26 | Performed by: COLON & RECTAL SURGERY

## 2017-04-05 PROCEDURE — 88309 TISSUE EXAM BY PATHOLOGIST: CPT | Mod: 26 | Performed by: COLON & RECTAL SURGERY

## 2017-04-05 PROCEDURE — 84703 CHORIONIC GONADOTROPIN ASSAY: CPT | Performed by: SURGERY

## 2017-04-05 PROCEDURE — 36415 COLL VENOUS BLD VENIPUNCTURE: CPT | Performed by: COLON & RECTAL SURGERY

## 2017-04-05 PROCEDURE — 40000170 ZZH STATISTIC PRE-PROCEDURE ASSESSMENT II: Performed by: COLON & RECTAL SURGERY

## 2017-04-05 PROCEDURE — 25800025 ZZH RX 258: Performed by: COLON & RECTAL SURGERY

## 2017-04-05 PROCEDURE — 25000132 ZZH RX MED GY IP 250 OP 250 PS 637: Performed by: COLON & RECTAL SURGERY

## 2017-04-05 PROCEDURE — 37000008 ZZH ANESTHESIA TECHNICAL FEE, 1ST 30 MIN: Performed by: COLON & RECTAL SURGERY

## 2017-04-05 PROCEDURE — 25000125 ZZHC RX 250: Performed by: COLON & RECTAL SURGERY

## 2017-04-05 PROCEDURE — 25800025 ZZH RX 258: Performed by: SURGERY

## 2017-04-05 PROCEDURE — 25000566 ZZH SEVOFLURANE, EA 15 MIN: Performed by: COLON & RECTAL SURGERY

## 2017-04-05 PROCEDURE — 82565 ASSAY OF CREATININE: CPT | Performed by: COLON & RECTAL SURGERY

## 2017-04-05 PROCEDURE — 25000125 ZZHC RX 250: Performed by: SURGERY

## 2017-04-05 RX ORDER — MAGNESIUM HYDROXIDE 1200 MG/15ML
LIQUID ORAL PRN
Status: DISCONTINUED | OUTPATIENT
Start: 2017-04-05 | End: 2017-04-05 | Stop reason: HOSPADM

## 2017-04-05 RX ORDER — DEXAMETHASONE SODIUM PHOSPHATE 4 MG/ML
INJECTION, SOLUTION INTRA-ARTICULAR; INTRALESIONAL; INTRAMUSCULAR; INTRAVENOUS; SOFT TISSUE PRN
Status: DISCONTINUED | OUTPATIENT
Start: 2017-04-05 | End: 2017-04-05

## 2017-04-05 RX ORDER — NEOSTIGMINE METHYLSULFATE 1 MG/ML
VIAL (ML) INJECTION PRN
Status: DISCONTINUED | OUTPATIENT
Start: 2017-04-05 | End: 2017-04-05

## 2017-04-05 RX ORDER — LIDOCAINE HYDROCHLORIDE 20 MG/ML
INJECTION, SOLUTION INFILTRATION; PERINEURAL PRN
Status: DISCONTINUED | OUTPATIENT
Start: 2017-04-05 | End: 2017-04-05

## 2017-04-05 RX ORDER — CITALOPRAM HYDROBROMIDE 40 MG/1
40 TABLET ORAL DAILY
Status: DISCONTINUED | OUTPATIENT
Start: 2017-04-06 | End: 2017-04-10 | Stop reason: HOSPADM

## 2017-04-05 RX ORDER — DIPHENHYDRAMINE HYDROCHLORIDE 50 MG/ML
25 INJECTION INTRAMUSCULAR; INTRAVENOUS EVERY 6 HOURS PRN
Status: DISCONTINUED | OUTPATIENT
Start: 2017-04-05 | End: 2017-04-10 | Stop reason: HOSPADM

## 2017-04-05 RX ORDER — GLYCOPYRROLATE 0.2 MG/ML
INJECTION, SOLUTION INTRAMUSCULAR; INTRAVENOUS PRN
Status: DISCONTINUED | OUTPATIENT
Start: 2017-04-05 | End: 2017-04-05

## 2017-04-05 RX ORDER — PROPOFOL 10 MG/ML
INJECTION, EMULSION INTRAVENOUS PRN
Status: DISCONTINUED | OUTPATIENT
Start: 2017-04-05 | End: 2017-04-05

## 2017-04-05 RX ORDER — LIDOCAINE 40 MG/G
CREAM TOPICAL
Status: DISCONTINUED | OUTPATIENT
Start: 2017-04-05 | End: 2017-04-10 | Stop reason: HOSPADM

## 2017-04-05 RX ORDER — FLUTICASONE PROPIONATE 50 MCG
2 SPRAY, SUSPENSION (ML) NASAL DAILY PRN
COMMUNITY
End: 2017-08-21

## 2017-04-05 RX ORDER — LEVALBUTEROL TARTRATE 45 UG/1
1-2 AEROSOL, METERED ORAL EVERY 4 HOURS PRN
Status: DISCONTINUED | OUTPATIENT
Start: 2017-04-05 | End: 2017-04-10 | Stop reason: HOSPADM

## 2017-04-05 RX ORDER — SODIUM CHLORIDE, SODIUM LACTATE, POTASSIUM CHLORIDE, CALCIUM CHLORIDE 600; 310; 30; 20 MG/100ML; MG/100ML; MG/100ML; MG/100ML
INJECTION, SOLUTION INTRAVENOUS CONTINUOUS
Status: DISCONTINUED | OUTPATIENT
Start: 2017-04-05 | End: 2017-04-05

## 2017-04-05 RX ORDER — ALBUTEROL SULFATE 0.83 MG/ML
2.5 SOLUTION RESPIRATORY (INHALATION) EVERY 6 HOURS PRN
Status: DISCONTINUED | OUTPATIENT
Start: 2017-04-05 | End: 2017-04-10 | Stop reason: HOSPADM

## 2017-04-05 RX ORDER — ACETAMINOPHEN 325 MG/1
975 TABLET ORAL ONCE
Status: COMPLETED | OUTPATIENT
Start: 2017-04-05 | End: 2017-04-05

## 2017-04-05 RX ORDER — ONDANSETRON 2 MG/ML
4 INJECTION INTRAMUSCULAR; INTRAVENOUS EVERY 6 HOURS PRN
Status: DISCONTINUED | OUTPATIENT
Start: 2017-04-05 | End: 2017-04-10 | Stop reason: HOSPADM

## 2017-04-05 RX ORDER — VECURONIUM BROMIDE 1 MG/ML
INJECTION, POWDER, LYOPHILIZED, FOR SOLUTION INTRAVENOUS PRN
Status: DISCONTINUED | OUTPATIENT
Start: 2017-04-05 | End: 2017-04-05

## 2017-04-05 RX ORDER — ONDANSETRON 2 MG/ML
INJECTION INTRAMUSCULAR; INTRAVENOUS PRN
Status: DISCONTINUED | OUTPATIENT
Start: 2017-04-05 | End: 2017-04-05

## 2017-04-05 RX ORDER — SODIUM CHLORIDE, SODIUM LACTATE, POTASSIUM CHLORIDE, CALCIUM CHLORIDE 600; 310; 30; 20 MG/100ML; MG/100ML; MG/100ML; MG/100ML
INJECTION, SOLUTION INTRAVENOUS CONTINUOUS PRN
Status: DISCONTINUED | OUTPATIENT
Start: 2017-04-05 | End: 2017-04-05

## 2017-04-05 RX ORDER — PROPOFOL 10 MG/ML
INJECTION, EMULSION INTRAVENOUS CONTINUOUS PRN
Status: DISCONTINUED | OUTPATIENT
Start: 2017-04-05 | End: 2017-04-05

## 2017-04-05 RX ORDER — CIPROFLOXACIN 2 MG/ML
400 INJECTION, SOLUTION INTRAVENOUS EVERY 12 HOURS
Status: COMPLETED | OUTPATIENT
Start: 2017-04-05 | End: 2017-04-06

## 2017-04-05 RX ORDER — HEPARIN SODIUM 5000 [USP'U]/.5ML
5000 INJECTION, SOLUTION INTRAVENOUS; SUBCUTANEOUS
Status: COMPLETED | OUTPATIENT
Start: 2017-04-05 | End: 2017-04-05

## 2017-04-05 RX ORDER — BUPROPION HYDROCHLORIDE 150 MG/1
150 TABLET, EXTENDED RELEASE ORAL 2 TIMES DAILY
Status: DISCONTINUED | OUTPATIENT
Start: 2017-04-05 | End: 2017-04-10 | Stop reason: HOSPADM

## 2017-04-05 RX ORDER — ACETAMINOPHEN 10 MG/ML
1000 INJECTION, SOLUTION INTRAVENOUS ONCE
Status: COMPLETED | OUTPATIENT
Start: 2017-04-05 | End: 2017-04-05

## 2017-04-05 RX ORDER — NALOXONE HYDROCHLORIDE 0.4 MG/ML
.1-.4 INJECTION, SOLUTION INTRAMUSCULAR; INTRAVENOUS; SUBCUTANEOUS
Status: DISCONTINUED | OUTPATIENT
Start: 2017-04-05 | End: 2017-04-10 | Stop reason: HOSPADM

## 2017-04-05 RX ORDER — MEPERIDINE HYDROCHLORIDE 25 MG/ML
12.5 INJECTION INTRAMUSCULAR; INTRAVENOUS; SUBCUTANEOUS EVERY 5 MIN PRN
Status: DISCONTINUED | OUTPATIENT
Start: 2017-04-05 | End: 2017-04-05

## 2017-04-05 RX ORDER — FENTANYL CITRATE 50 UG/ML
INJECTION, SOLUTION INTRAMUSCULAR; INTRAVENOUS PRN
Status: DISCONTINUED | OUTPATIENT
Start: 2017-04-05 | End: 2017-04-05

## 2017-04-05 RX ORDER — FLUTICASONE PROPIONATE 50 MCG
2 SPRAY, SUSPENSION (ML) NASAL DAILY PRN
Status: DISCONTINUED | OUTPATIENT
Start: 2017-04-05 | End: 2017-04-10 | Stop reason: HOSPADM

## 2017-04-05 RX ORDER — LEVOTHYROXINE SODIUM 125 UG/1
125 TABLET ORAL DAILY
Status: DISCONTINUED | OUTPATIENT
Start: 2017-04-06 | End: 2017-04-10 | Stop reason: HOSPADM

## 2017-04-05 RX ORDER — ONDANSETRON 4 MG/1
4 TABLET, ORALLY DISINTEGRATING ORAL EVERY 30 MIN PRN
Status: DISCONTINUED | OUTPATIENT
Start: 2017-04-05 | End: 2017-04-05

## 2017-04-05 RX ORDER — FENTANYL CITRATE 0.05 MG/ML
25-50 INJECTION, SOLUTION INTRAMUSCULAR; INTRAVENOUS
Status: DISCONTINUED | OUTPATIENT
Start: 2017-04-05 | End: 2017-04-05

## 2017-04-05 RX ORDER — CIPROFLOXACIN 2 MG/ML
400 INJECTION, SOLUTION INTRAVENOUS SEE ADMIN INSTRUCTIONS
Status: DISCONTINUED | OUTPATIENT
Start: 2017-04-05 | End: 2017-04-05

## 2017-04-05 RX ORDER — BUPIVACAINE HYDROCHLORIDE 5 MG/ML
INJECTION, SOLUTION PERINEURAL PRN
Status: DISCONTINUED | OUTPATIENT
Start: 2017-04-05 | End: 2017-04-05 | Stop reason: HOSPADM

## 2017-04-05 RX ORDER — ONDANSETRON 2 MG/ML
4 INJECTION INTRAMUSCULAR; INTRAVENOUS EVERY 30 MIN PRN
Status: DISCONTINUED | OUTPATIENT
Start: 2017-04-05 | End: 2017-04-05

## 2017-04-05 RX ORDER — SODIUM CHLORIDE, SODIUM LACTATE, POTASSIUM CHLORIDE, CALCIUM CHLORIDE 600; 310; 30; 20 MG/100ML; MG/100ML; MG/100ML; MG/100ML
INJECTION, SOLUTION INTRAVENOUS CONTINUOUS
Status: DISCONTINUED | OUTPATIENT
Start: 2017-04-05 | End: 2017-04-08

## 2017-04-05 RX ORDER — CIPROFLOXACIN 2 MG/ML
400 INJECTION, SOLUTION INTRAVENOUS
Status: COMPLETED | OUTPATIENT
Start: 2017-04-05 | End: 2017-04-05

## 2017-04-05 RX ADMIN — GLYCOPYRROLATE 0.8 MG: 0.2 INJECTION, SOLUTION INTRAMUSCULAR; INTRAVENOUS at 14:35

## 2017-04-05 RX ADMIN — VECURONIUM BROMIDE 1 MG: 1 INJECTION, POWDER, LYOPHILIZED, FOR SOLUTION INTRAVENOUS at 13:30

## 2017-04-05 RX ADMIN — SODIUM CHLORIDE, POTASSIUM CHLORIDE, SODIUM LACTATE AND CALCIUM CHLORIDE: 600; 310; 30; 20 INJECTION, SOLUTION INTRAVENOUS at 06:55

## 2017-04-05 RX ADMIN — METRONIDAZOLE 500 MG: 500 INJECTION, SOLUTION INTRAVENOUS at 17:39

## 2017-04-05 RX ADMIN — HYDROMORPHONE HYDROCHLORIDE 1 MG: 1 INJECTION, SOLUTION INTRAMUSCULAR; INTRAVENOUS; SUBCUTANEOUS at 07:58

## 2017-04-05 RX ADMIN — SODIUM CHLORIDE, POTASSIUM CHLORIDE, SODIUM LACTATE AND CALCIUM CHLORIDE: 600; 310; 30; 20 INJECTION, SOLUTION INTRAVENOUS at 19:44

## 2017-04-05 RX ADMIN — PHENYLEPHRINE HYDROCHLORIDE 50 MCG: 10 INJECTION, SOLUTION INTRAMUSCULAR; INTRAVENOUS; SUBCUTANEOUS at 10:13

## 2017-04-05 RX ADMIN — VECURONIUM BROMIDE 2 MG: 1 INJECTION, POWDER, LYOPHILIZED, FOR SOLUTION INTRAVENOUS at 10:13

## 2017-04-05 RX ADMIN — PHENYLEPHRINE HYDROCHLORIDE 100 MCG: 10 INJECTION, SOLUTION INTRAMUSCULAR; INTRAVENOUS; SUBCUTANEOUS at 09:48

## 2017-04-05 RX ADMIN — PHENYLEPHRINE HYDROCHLORIDE 100 MCG: 10 INJECTION, SOLUTION INTRAMUSCULAR; INTRAVENOUS; SUBCUTANEOUS at 10:02

## 2017-04-05 RX ADMIN — LIDOCAINE HYDROCHLORIDE 100 MG: 20 INJECTION, SOLUTION INFILTRATION; PERINEURAL at 07:33

## 2017-04-05 RX ADMIN — CIPROFLOXACIN 400 MG: 2 INJECTION INTRAVENOUS at 07:41

## 2017-04-05 RX ADMIN — FENTANYL CITRATE 50 MCG: 50 INJECTION, SOLUTION INTRAMUSCULAR; INTRAVENOUS at 14:51

## 2017-04-05 RX ADMIN — PHENYLEPHRINE HYDROCHLORIDE 50 MCG: 10 INJECTION, SOLUTION INTRAMUSCULAR; INTRAVENOUS; SUBCUTANEOUS at 09:02

## 2017-04-05 RX ADMIN — PROPOFOL 50 MCG/KG/MIN: 10 INJECTION, EMULSION INTRAVENOUS at 07:43

## 2017-04-05 RX ADMIN — VECURONIUM BROMIDE 2 MG: 1 INJECTION, POWDER, LYOPHILIZED, FOR SOLUTION INTRAVENOUS at 09:36

## 2017-04-05 RX ADMIN — HYDROMORPHONE HYDROCHLORIDE: 10 INJECTION, SOLUTION INTRAMUSCULAR; INTRAVENOUS; SUBCUTANEOUS at 15:28

## 2017-04-05 RX ADMIN — HEPARIN SODIUM 5000 UNITS: 10000 INJECTION, SOLUTION INTRAVENOUS; SUBCUTANEOUS at 07:41

## 2017-04-05 RX ADMIN — CIPROFLOXACIN 400 MG: 2 INJECTION, SOLUTION INTRAVENOUS at 19:47

## 2017-04-05 RX ADMIN — FENTANYL CITRATE 50 MCG: 50 INJECTION, SOLUTION INTRAMUSCULAR; INTRAVENOUS at 07:32

## 2017-04-05 RX ADMIN — VECURONIUM BROMIDE 2 MG: 1 INJECTION, POWDER, LYOPHILIZED, FOR SOLUTION INTRAVENOUS at 08:36

## 2017-04-05 RX ADMIN — PROPOFOL 200 MG: 10 INJECTION, EMULSION INTRAVENOUS at 07:33

## 2017-04-05 RX ADMIN — SODIUM CHLORIDE, POTASSIUM CHLORIDE, SODIUM LACTATE AND CALCIUM CHLORIDE: 600; 310; 30; 20 INJECTION, SOLUTION INTRAVENOUS at 07:41

## 2017-04-05 RX ADMIN — MIDAZOLAM HYDROCHLORIDE 2 MG: 1 INJECTION, SOLUTION INTRAMUSCULAR; INTRAVENOUS at 07:27

## 2017-04-05 RX ADMIN — NEOSTIGMINE METHYLSULFATE 4 MG: 1 INJECTION INTRAMUSCULAR; INTRAVENOUS; SUBCUTANEOUS at 14:35

## 2017-04-05 RX ADMIN — FENTANYL CITRATE 30 MCG: 50 INJECTION, SOLUTION INTRAMUSCULAR; INTRAVENOUS at 14:57

## 2017-04-05 RX ADMIN — METRONIDAZOLE 500 MG: 500 INJECTION, SOLUTION INTRAVENOUS at 07:41

## 2017-04-05 RX ADMIN — ROCURONIUM BROMIDE 50 MG: 10 INJECTION INTRAVENOUS at 07:33

## 2017-04-05 RX ADMIN — VECURONIUM BROMIDE 2 MG: 1 INJECTION, POWDER, LYOPHILIZED, FOR SOLUTION INTRAVENOUS at 08:04

## 2017-04-05 RX ADMIN — FENTANYL CITRATE 100 MCG: 50 INJECTION, SOLUTION INTRAMUSCULAR; INTRAVENOUS at 08:12

## 2017-04-05 RX ADMIN — BUPROPION HYDROCHLORIDE 150 MG: 150 TABLET, FILM COATED, EXTENDED RELEASE ORAL at 21:37

## 2017-04-05 RX ADMIN — SODIUM CHLORIDE, POTASSIUM CHLORIDE, SODIUM LACTATE AND CALCIUM CHLORIDE: 600; 310; 30; 20 INJECTION, SOLUTION INTRAVENOUS at 13:08

## 2017-04-05 RX ADMIN — SODIUM CHLORIDE, POTASSIUM CHLORIDE, SODIUM LACTATE AND CALCIUM CHLORIDE: 600; 310; 30; 20 INJECTION, SOLUTION INTRAVENOUS at 14:30

## 2017-04-05 RX ADMIN — VECURONIUM BROMIDE 2 MG: 1 INJECTION, POWDER, LYOPHILIZED, FOR SOLUTION INTRAVENOUS at 10:45

## 2017-04-05 RX ADMIN — ACETAMINOPHEN 1000 MG: 500 TABLET, FILM COATED ORAL at 06:20

## 2017-04-05 RX ADMIN — DEXMEDETOMIDINE: 100 INJECTION, SOLUTION, CONCENTRATE INTRAVENOUS at 09:54

## 2017-04-05 RX ADMIN — ONDANSETRON 4 MG: 2 INJECTION INTRAMUSCULAR; INTRAVENOUS at 13:54

## 2017-04-05 RX ADMIN — PHENYLEPHRINE HYDROCHLORIDE 100 MCG: 10 INJECTION, SOLUTION INTRAMUSCULAR; INTRAVENOUS; SUBCUTANEOUS at 10:37

## 2017-04-05 RX ADMIN — FENTANYL CITRATE 25 MCG: 50 INJECTION, SOLUTION INTRAMUSCULAR; INTRAVENOUS at 15:22

## 2017-04-05 RX ADMIN — VECURONIUM BROMIDE 1 MG: 1 INJECTION, POWDER, LYOPHILIZED, FOR SOLUTION INTRAVENOUS at 12:43

## 2017-04-05 RX ADMIN — LIDOCAINE HYDROCHLORIDE 1 ML: 10 INJECTION, SOLUTION EPIDURAL; INFILTRATION; INTRACAUDAL; PERINEURAL at 06:54

## 2017-04-05 RX ADMIN — ACETAMINOPHEN 1000 MG: 10 INJECTION, SOLUTION INTRAVENOUS at 15:28

## 2017-04-05 RX ADMIN — SODIUM CHLORIDE, POTASSIUM CHLORIDE, SODIUM LACTATE AND CALCIUM CHLORIDE: 600; 310; 30; 20 INJECTION, SOLUTION INTRAVENOUS at 08:13

## 2017-04-05 RX ADMIN — VECURONIUM BROMIDE 2 MG: 1 INJECTION, POWDER, LYOPHILIZED, FOR SOLUTION INTRAVENOUS at 10:03

## 2017-04-05 RX ADMIN — FENTANYL CITRATE 20 MCG: 50 INJECTION, SOLUTION INTRAMUSCULAR; INTRAVENOUS at 14:46

## 2017-04-05 RX ADMIN — VECURONIUM BROMIDE 2 MG: 1 INJECTION, POWDER, LYOPHILIZED, FOR SOLUTION INTRAVENOUS at 13:07

## 2017-04-05 RX ADMIN — DEXMEDETOMIDINE 0.4 MCG/KG/HR: 100 INJECTION, SOLUTION, CONCENTRATE INTRAVENOUS at 07:59

## 2017-04-05 RX ADMIN — VECURONIUM BROMIDE 1 MG: 1 INJECTION, POWDER, LYOPHILIZED, FOR SOLUTION INTRAVENOUS at 11:56

## 2017-04-05 RX ADMIN — DEXAMETHASONE SODIUM PHOSPHATE 8 MG: 4 INJECTION, SOLUTION INTRAMUSCULAR; INTRAVENOUS at 08:15

## 2017-04-05 NOTE — LETTER
Transition Communication Hand-off for Care Transitions to Next Level of Care Provider    Name: Yahaira Ramirez  MRN #: [id number]  Primary Care Provider: Guille Torre     Primary Clinic: [facility] [address] [facility] [address]     Reason for Hospitalization:  RECTAL CANCER  Rectal cancer (H)  Admit Date/Time: 4/5/2017  5:21 AM  Discharge Date: 4/10/2017  Payor Source: Payor: BCBS / Plan: [place]BS [place] / Product Type: Indemnity /     Readmission Assessment Measure (PATRICK) Risk Score/category: Average         Reason for Communication Hand-off Referral: Other informational    Discharge Plan: home with assist of her mother and home RN through [facility], home on lovenox       Concern for non-adherence with plan of care:   Y/N N  Discharge Needs Assessment:      Already enrolled in Tele-monitoring program and name of program:  n/a  Follow-up specialty is recommended: Yes, [facility]    Follow-up plan:  No future appointments.    Any outstanding tests or procedures:        Referrals     Future Labs/Procedures    Home care nursing referral     Comments:    RN skilled nursing visit. RN to provide support for ileostomy and lovenox shots.    Your provider has ordered home care nursing services. If you have not been contacted within 2 days of your discharge please call the inpatient department phone number at [phone] .            Key Recommendations:  Patient will discharge today to home with the assist of her mother and Home RN provided through [facility].     Taryn Busch    AVS/Discharge Summary is the source of truth; this is a helpful guide for improved communication of patient story

## 2017-04-05 NOTE — BRIEF OP NOTE
[facility] Brief Operative Note    Pre-operative diagnosis: RECTAL CANCER   Post-operative diagnosis same   Procedure: Procedure(s):  ROBOTIC ASSISTED LOW ANTERIOR RESECTION WITH DIVERTING LOOP ILEOSTOMY - Wound Class: II-Clean Contaminated   Surgeon(s): Surgeon(s) and Role:     * Yair Adkins MD - Primary   Estimated blood loss: * No values recorded between 4/5/2017  8:06 AM and 4/5/2017  2:44 PM *    Specimens:   ID Type Source Tests Collected by Time Destination   A : rectum and sigmoid colon, single stitch at vascular pedicle and double stitch proximal, staple line distal Tissue Large Intestine, Rectum SURGICAL PATHOLOGY EXAM Yair Adkins MD 4/5/2017 12:34 PM    B : distal ring without stitches and proximal ring with stitches Tissue Large Intestine, Rectum SURGICAL PATHOLOGY EXAM Yair Adkins MD 4/5/2017  1:20 PM       Findings: Anastomosis at 5 cm.  No leak.         IVF: 3300  UOP: 150  Condition on discharge from OR: Satisfactory    Yair Adkins MD   [facility]   [phone].        ADDENDUM:    PATIENT DATA  Indicate Y or N:  Home O2 No  Hemodialysis  No  Transplant patient  No  Cirrhosis  No  Steroids in last 30 days  No  Immunomodulators in last 30 days  No  Anticoagulation at time of surgery  Yes   List medication ibuprofen  Prior abdominal surgery  No  Pelvic irradiation  Yes    Albumin within 30 days if known unknown   Hgb within 30 days if known 11.7   Hemoglobin   Date Value Ref Range Status   03/15/2017 11.7 11.7 - 15.7 g/dL Final   ]  Cr within 30 days if known 0.73   Creatinine   Date Value Ref Range Status   10/28/2016 0.73 0.52 - 1.04 mg/dL Final   ]  Body mass index is 44.03 kg/(m^2).      OR DATA  Emergent  No   <24 hours  No   <1 week  No  Bowel Prep Yes  Antibiotics  Yes  DVT prophylaxis    Heparin  Yes   SCD  Yes   None  No  Drain  Yes  ASA (1,2,3,4) 3  OR time (min) 730 min  Stents  No  Transfuse >/= 2U  No  Anastomosis   Stapled   Yes   Handsewn  No  Leak Test    Positive  No   Negative  Yes   Not done  No    FOR CANCER ALSO COMPLETE:  Preoperative treatment (Y or N)   Chemo  Yes   Radiation Yes   Diversion  No  If rectal cancer   Distance from anal verge (cm) 10   Location    Anterior n    Posterior n    Lateral y    Circumferential n  Preoperative Stage   CT  Yes   MRI Yes   US  Yes   Clinical  Yes   Unknown  No   T stage (1,2,3,4) 3   N stage (0,1,2) 1   M stage (0,1) n  CEA see chart  Metastatic disease at time of operation  No       Route (Have a good day)

## 2017-04-05 NOTE — IP AVS SNAPSHOT
[place] [address] [facility]    [address] [place] [address]    Phone:  [phone]                                       After Visit Summary   4/5/2017    Yahaira Ramirez    MRN: [id number]           After Visit Summary Signature Page     I have received my discharge instructions, and my questions have been answered. I have discussed any challenges I see with this plan with the nurse or doctor.    ..........................................................................................................................................  Patient/Patient Representative Signature      ..........................................................................................................................................  Patient Representative Print Name and Relationship to Patient    ..................................................               ................................................  Date                                            Time    ..........................................................................................................................................  Reviewed by Signature/Title    ...................................................              ..............................................  Date                                                            Time

## 2017-04-05 NOTE — ANESTHESIA POSTPROCEDURE EVALUATION
Patient: Yahaira Ramirez    Procedure(s):  ROBOTIC ASSISTED LOW ANTERIOR RESECTION WITH DIVERTING LOOP ILEOSTOMY - Wound Class: II-Clean Contaminated    Diagnosis:RECTAL CANCER  Diagnosis Additional Information: No value filed.    Anesthesia Type:  General, ETT    Note:  Anesthesia Post Evaluation    Patient location during evaluation: PACU  Patient participation: Able to fully participate in evaluation  Level of consciousness: sleepy but conscious and responsive to verbal stimuli  Pain management: adequate  Airway patency: patent  Cardiovascular status: acceptable and hemodynamically stable  Respiratory status: acceptable and unassisted  Hydration status: acceptable  PONV: none     Anesthetic complications: None          Last vitals:  Vitals:    04/05/17 1540 04/05/17 1550 04/05/17 1600   BP: 102/66 105/71 105/72   Pulse:      Resp: 15 16 16   Temp:      SpO2: 95% 99% 99%         Electronically Signed By: Jefferson Lin MD  April 5, 2017  4:20 PM

## 2017-04-05 NOTE — IP AVS SNAPSHOT
MRN:[id number]                      After Visit Summary   4/5/2017    Yahaira Ramirez    MRN: [id number]           Thank you!     Thank you for choosing [facility] for your care. Our goal is always to provide you with excellent care. Hearing back from our patients is one way we can continue to improve our services. Please take a few minutes to complete the written survey that you may receive in the mail after you visit with us. Thank you!        Patient Information     Date Of Birth          1975        Designated Caregiver       Most Recent Value    Caregiver    Will someone help with your care after discharge? no      About your hospital stay     You were admitted on:  April 5, 2017 You last received care in the:  [place] [address] [facility]    You were discharged on:  April 10, 2017        Reason for your hospital stay       The patient was in the hospital to have surgery for rectal cancer.                  Who to Call     For medical emergencies, please call 911.  For non-urgent questions about your medical care, please call your primary care provider or clinic, [phone]  For questions related to your surgery, please call your surgery clinic        Attending Provider     Provider Specialty    Yair Adkins MD [facility]       Primary Care Provider Office Phone # Fax #    Guille oTrre [phone]       [facility] [address] [place] [address]        After Care Instructions     Activity       Your activity upon discharge:No heavy lifting or straining over 15-20 lbs for 6 weeks. No Driving while taking narcotic pain medications            Diet       Follow this diet upon discharge:Continue a low residue diet for 6 weeks.            Monitor and record       Monitor and record your daily ileostomy output. Call the clinic if you have >1500cc output in 24 hours, have decreased urine output or are feeling dehydrated.                   Follow-up Appointments     Follow-up and recommended labs and tests        Follow up in the office in 3-4 weeks with Dr. Adkins or at your already scheduled post op visit. Call [phone] to schedule your appointment. Call the office at [phone] if you develop fever, uncontrolled pain, bleeding, nausea, vomiting, or constipation.                  Additional Services     Home care nursing referral       RN skilled nursing visit. RN to provide support for ileostomy and lovenox shots.    Your provider has ordered home care nursing services. If you have not been contacted within 2 days of your discharge please call the inpatient department phone number at [phone] .                  Further instructions from your care team       Discharge Instructions following Abdominal Surgery  [facility]      Bowel Function  After removal of a portion of the intestines, it is normal for bowel movements to be erratic.  They are often looser and more frequent.  This condition generally resolves within a few weeks or it can be controlled with diet or medication.  Diet  In general, you may return to a well-balanced diet upon discharge.  Your doctor will let you know when to add extra fiber to your diet.  Be sure to drink plenty of fluids.  Incision  You should expect some discomfort in the area of your incision, particularly as you increase your activity.  If you notice an area of increasing redness or new drainage, please call your doctor.  You may shower as desired but refrain from tub baths or swimming until the incisions are fully healed.  Activity  Gradually increase your activity each day.  There are generally no restrictions on walking, climbing stairs, or riding in a car.  You can usually drive a car 7-10 days after your leave the hospital.  Do not drive while taking narcotic pain medications.  Ask your doctor regarding resumption of physical sexual activity; in  most cases, you can resume sex after a few weeks.  Your physician will advise you about when to return to work.  It is preferable to have somebody stay with you at home until you are fully healed and able to resume a normal level of activity   Medications  You will be discharged with a prescription pain medication and any medications you were taking prior to surgery.  Do not drive while taking narcotic pain medications.  If you need additional medications or a refill, you must call your doctor during normal business hours.  It is the policy of [facility] to not refill pain medication after hours or on weekends because your chart is not available.  Follow-up  Typically, you will be asked to schedule a follow-up office visit 1 to 4 weeks after surgery.  If you have any questions related to follow-up, medications, or your condition, please call the office.      Call your surgeon if you have these signs or symptoms:  ([phone])    Problem with the incision, including increasing pain, swelling, redness, or drainage    Increasing abdominal pain    Uncontrolled nausea or vomiting    Fever or chills    Constipation  (no bowel movement for 3 days)    Diarrhea (more than 3 watery stools within 24 hours)    Bleeding from the rectum, wound, or stoma    Any questions or concerns        Pending Results     No orders found from 4/3/2017 to 4/6/2017.            Statement of Approval     Ordered          04/10/17 0835  I have reviewed and agree with all the recommendations and orders detailed in this document.  EFFECTIVE NOW     Approved and electronically signed by:  Anu Stauffer PA-C             Admission Information     Date & Time Provider Department Dept. Phone    4/5/2017 Yair Adkins MD [place] [address] [facility] [phone]      Your Vitals Were     Blood Pressure Pulse Temperature Respirations Height Weight    125/78 (BP Location: Left arm) 92 98.3  F (36.8  " C) (Oral) 16 1.651 m (5' 5\") 120 kg (264 lb 9 oz)    Pulse Oximetry BMI (Body Mass Index)                96% 44.03 kg/m2          [employer] Information     [employer] gives you secure access to your electronic health record. If you see a primary care provider, you can also send messages to your care team and make appointments. If you have questions, please call your primary care clinic.  If you do not have a primary care provider, please call [phone] and they will assist you.        Care EveryWhere ID     This is your Care EveryWhere ID. This could be used by other organizations to access your [place] medical records  [id number]           Review of your medicines      START taking        Dose / Directions    enoxaparin 40 MG/0.4ML injection   Commonly known as:  LOVENOX        Dose:  40 mg   Inject 0.4 mLs (40 mg) Subcutaneous every 24 hours   Quantity:  25 Syringe   Refills:  0       oxyCODONE-acetaminophen 5-325 MG per tablet   Commonly known as:  PERCOCET        Dose:  1-2 tablet   Take 1-2 tablets by mouth every 6 hours as needed for moderate to severe pain   Quantity:  40 tablet   Refills:  0         CONTINUE these medicines which have NOT CHANGED        Dose / Directions    BENADRYL PO        Dose:  50 mg   Take 50 mg by mouth At Bedtime (2 x 25 mg tablet = 50 mg dose)   Refills:  0       buPROPion 150 MG 12 hr tablet   Commonly known as:  WELLBUTRIN SR   Used for:  Major depressive disorder, recurrent episode, mild (H)        Dose:  150 mg   Take 1 tablet (150 mg) by mouth 2 times daily   Quantity:  180 tablet   Refills:  3       citalopram 40 MG tablet   Commonly known as:  celeXA   Used for:  Major depressive disorder, recurrent episode, mild (H)        Dose:  40 mg   Take 1 tablet (40 mg) by mouth daily   Quantity:  30 tablet   Refills:  0       fluticasone 50 MCG/ACT spray   Commonly known as:  FLONASE        Dose:  2 spray   Spray 2 sprays into both nostrils daily as needed for rhinitis or allergies " (Congestion)   Refills:  0       IBUPROFEN PO        Dose:  800 mg   Take 800 mg by mouth daily as needed for moderate pain (Headaches)   Refills:  0       levalbuterol 45 MCG/ACT Inhaler   Commonly known as:  XOPENEX HFA        Dose:  1-2 puff   Inhale 1-2 puffs into the lungs every 4 hours as needed for shortness of breath / dyspnea.   Quantity:  1 Inhaler   Refills:  3       mometasone-formoterol 100-5 MCG/ACT oral inhaler   Commonly known as:  DULERA        Dose:  2 puff   Inhale 2 puffs into the lungs 2 times daily   Refills:  0       * SYNTHROID PO        Dose:  125 mcg   Take 125 mcg by mouth daily   Refills:  0       * SYNTHROID PO        Dose:  62.5 mcg   Take 62.5 mcg by mouth once a week (Sundays) 0.5 x 125 mcg = 62.5 mcg (Patient takes this with her regular daily dose of 125 mcg = 187.5 mcg on Sundays)   Refills:  0       TYLENOL PO        Dose:  1000 mg   Take 1,000 mg by mouth daily as needed for mild pain (Headaches)   Refills:  0       * Notice:  This list has 2 medication(s) that are the same as other medications prescribed for you. Read the directions carefully, and ask your doctor or other care provider to review them with you.         Where to get your medicines      These medications were sent to [facility] - [address]  [address], [place] [address]     Phone:  [phone]     enoxaparin 40 MG/0.4ML injection         Some of these will need a paper prescription and others can be bought over the counter. Ask your nurse if you have questions.     Bring a paper prescription for each of these medications     oxyCODONE-acetaminophen 5-325 MG per tablet                Protect others around you: Learn how to safely use, store and throw away your medicines at www.disposemymeds.org.             Medication List: This is a list of all your medications and when to take them. Check marks below indicate your daily home schedule. Keep this list as a reference.       Medications           Morning Afternoon Evening Bedtime As Needed    BENADRYL PO   Take 50 mg by mouth At Bedtime (2 x 25 mg tablet = 50 mg dose)                                   buPROPion 150 MG 12 hr tablet   Commonly known as:  WELLBUTRIN SR   Take 1 tablet (150 mg) by mouth 2 times daily   Last time this was given:  150 mg on 4/10/2017  8:25 AM                                      citalopram 40 MG tablet   Commonly known as:  celeXA   Take 1 tablet (40 mg) by mouth daily   Last time this was given:  40 mg on 4/10/2017  8:25 AM                                   enoxaparin 40 MG/0.4ML injection   Commonly known as:  LOVENOX   Inject 0.4 mLs (40 mg) Subcutaneous every 24 hours   Last time this was given:  40 mg on 4/10/2017  8:25 AM                                   fluticasone 50 MCG/ACT spray   Commonly known as:  FLONASE   Spray 2 sprays into both nostrils daily as needed for rhinitis or allergies (Congestion)                                   IBUPROFEN PO   Take 800 mg by mouth daily as needed for moderate pain (Headaches)   Last time this was given:  800 mg on 4/9/2017 10:19 AM                                   levalbuterol 45 MCG/ACT Inhaler   Commonly known as:  XOPENEX HFA   Inhale 1-2 puffs into the lungs every 4 hours as needed for shortness of breath / dyspnea.                                   mometasone-formoterol 100-5 MCG/ACT oral inhaler   Commonly known as:  DULERA   Inhale 2 puffs into the lungs 2 times daily   Last time this was given:  2 puffs on 4/10/2017  8:26 AM                                      oxyCODONE-acetaminophen 5-325 MG per tablet   Commonly known as:  PERCOCET   Take 1-2 tablets by mouth every 6 hours as needed for moderate to severe pain   Last time this was given:  2 tablets on 4/10/2017  9:30 AM                                   * SYNTHROID PO   Take 125 mcg by mouth daily   Last time this was given:  125 mcg on 4/10/2017  8:25 AM                                   *  SYNTHROID PO   Take 62.5 mcg by mouth once a week (Sundays) 0.5 x 125 mcg = 62.5 mcg (Patient takes this with her regular daily dose of 125 mcg = 187.5 mcg on Sundays)   Last time this was given:  125 mcg on 4/10/2017  8:25 AM                                TYLENOL PO   Take 1,000 mg by mouth daily as needed for mild pain (Headaches)   Last time this was given:  650 mg on 4/7/2017  2:01 PM                                   * Notice:  This list has 2 medication(s) that are the same as other medications prescribed for you. Read the directions carefully, and ask your doctor or other care provider to review them with you.

## 2017-04-05 NOTE — ANESTHESIA CARE TRANSFER NOTE
Patient: Yahaira Ramirez    Procedure(s):  ROBOTIC ASSISTED LOW ANTERIOR RESECTION WITH DIVERTING LOOP ILEOSTOMY - Wound Class: II-Clean Contaminated    Diagnosis: RECTAL CANCER  Diagnosis Additional Information: No value filed.    Anesthesia Type:   General, ETT     Note:  Airway :Face Mask  Patient transferred to:PACU        Vitals: (Last set prior to Anesthesia Care Transfer)    CRNA VITALS  4/5/2017 1428 - 4/5/2017 1507      4/5/2017             Pulse: 81    SpO2: 100 %                Electronically Signed By: DORENE Lockhart CRNA  April 5, 2017  3:07 PM

## 2017-04-05 NOTE — ANESTHESIA PREPROCEDURE EVALUATION
Procedure: Procedure(s):  DAVINCI XI COLECTOMY  Preop diagnosis: RECTAL CANCER    Allergies   Allergen Reactions     Cats      Pollen Extract      Sulfa Drugs Hives     Adhesive Tape Itching and Rash     Past Medical History:   Diagnosis Date     ASCUS on Pap smear 1/23/03    + HPV     Asthma      ASTHMA - MILD INTERMITTENT 8/23/2005     Calculus of gallbladder without mention of cholecystitis or obstruction      CARDIOVASCULAR SCREENING; LDL GOAL LESS THAN 160 10/31/2010     Colon cancer (H)     chemo and radiation therapy started january 9 until february 11, 2017     Depressive disorder 2/22/2011     History of colposcopy with cervical biopsy 2/20/03, 7/23/03    SIMONE I & III, SIMONE I     HUMAN PAPILLOMAVIRUS NOS 3/24/2003    61-low risk     HYPOTHYROIDISM NOS 9/10/2002     Mild major depression (H) 2/22/2011     Other postablative hypothyroidism     iatrogenically induced hypothyroidism after I-31 for hyperthyroidism   abstracted [id number]     Renal stones     HX of     Past Surgical History:   Procedure Laterality Date     C IUD,MIRENA  4/2016     CHOLECYSTECTOMY       COLONOSCOPY Left 12/9/2016    Procedure: COMBINED COLONOSCOPY, SINGLE OR MULTIPLE BIOPSY/POLYPECTOMY BY BIOPSY;  Surgeon: Claudette De La Paz MD;  Location:  GI     ENT SURGERY  03/2016    Tonsilectomy     EXCISE MASS UPPER EXTREMITY  12/21/2012    Procedure: EXCISE MASS UPPER EXTREMITY;  Excision Subcutaneous Mass Right Tricep;  Surgeon: Mehul Maldonado MD;  Location:  OR     EYE SURGERY      Lasik     HC CAUTERY OF CERVIX, CRYOCAUTERY  3/23/03     MAMMOPLASTY REDUCTION BILATERAL  7/3/2014    Procedure: MAMMOPLASTY REDUCTION BILATERAL;  Surgeon: Yoanna Mccabe MD;  Location:  SD     SIGMOIDOSCOPY FLEXIBLE N/A 3/21/2017    Procedure: SIGMOIDOSCOPY FLEXIBLE;  Surgeon: Yair Adkins MD;  Location:  GI     Prior to Admission medications    Medication Sig Start Date End Date Taking? Authorizing Provider   mometasone-formoterol  (DULERA) 100-5 MCG/ACT oral inhaler Inhale 2 puffs into the lungs 2 times daily   Yes Reported, Patient   fluticasone (FLONASE) 50 MCG/ACT spray Spray 2 sprays into both nostrils daily as needed for rhinitis or allergies (Congestion)   Yes Reported, Patient   Levothyroxine Sodium (SYNTHROID PO) Take 125 mcg by mouth daily   Yes Reported, Patient   Levothyroxine Sodium (SYNTHROID PO) Take 62.5 mcg by mouth once a week (Sundays) 0.5 x 125 mcg = 62.5 mcg (Patient takes this with her regular daily dose of 125 mcg = 187.5 mcg on Sundays)   Yes Reported, Patient   IBUPROFEN PO Take 800 mg by mouth daily as needed for moderate pain (Headaches)   Yes Reported, Patient   Acetaminophen (TYLENOL PO) Take 1,000 mg by mouth daily as needed for mild pain (Headaches)   Yes Reported, Patient   DiphenhydrAMINE HCl (BENADRYL PO) Take 50 mg by mouth At Bedtime (2 x 25 mg tablet = 50 mg dose)   Yes Reported, Patient   citalopram (CELEXA) 40 MG tablet Take 1 tablet (40 mg) by mouth daily 3/27/17  Yes Marty Osei MD   buPROPion (WELLBUTRIN SR) 150 MG 12 hr tablet Take 1 tablet (150 mg) by mouth 2 times daily 6/1/16  Yes Guille Torre MD   levalbuterol (XOPENEX HFA) 45 MCG/ACT inhaler Inhale 1-2 puffs into the lungs every 4 hours as needed for shortness of breath / dyspnea. 3/12/11  Yes Andreas Godoy MD     Current Facility-Administered Medications Ordered in Epic   Medication Dose Route Frequency Last Rate Last Dose     heparin sodium PF injection 5,000 Units  5,000 Units Subcutaneous Pre-Op/Pre-procedure x 1 dose         ciprofloxacin (CIPRO) infusion 400 mg  400 mg Intravenous Pre-Op/Pre-procedure x 1 dose         ciprofloxacin (CIPRO) infusion 400 mg  400 mg Intravenous See Admin Instructions         metroNIDAZOLE (FLAGYL) infusion 500 mg  500 mg Intravenous Pre-Op/Pre-procedure x 1 dose         metroNIDAZOLE (FLAGYL) infusion 500 mg  500 mg Intravenous See Admin Instructions         lidocaine 1 % 1 mL  1 mL Other Q1H PRN          lactated ringers infusion   Intravenous Continuous         No current [facility]-ordered outpatient prescriptions on file.     Wt Readings from Last 1 Encounters:   04/05/17 120 kg (264 lb 9 oz)     Temp Readings from Last 1 Encounters:   04/05/17 36.9  C (98.4  F) (Temporal)     BP Readings from Last 6 Encounters:   04/05/17 139/86   03/27/17 126/84   03/21/17 135/86   03/15/17 130/80   01/02/17 (!) 142/92   12/09/16 (!) 139/97     Pulse Readings from Last 4 Encounters:   04/05/17 109   03/27/17 87   03/15/17 98   01/02/17 101     Resp Readings from Last 1 Encounters:   04/05/17 16     SpO2 Readings from Last 1 Encounters:   04/05/17 98%     Recent Labs   Lab Test  03/15/17   1624  10/28/16   0910  04/01/16   1406   NA   --   139  141   POTASSIUM  4.1  4.6  4.2   CHLORIDE   --   106  106   CO2   --   28  24   ANIONGAP   --   5  11   GLC  78  87  84   BUN   --   9  9   CR   --   0.73  0.76   KATARZYNA   --   9.4  9.0     Recent Labs   Lab Test  03/15/17   1624  01/02/17   0827  10/28/16   0910   WBC   --   6.3  8.2   HGB  11.7  12.1  13.1   PLT  322  391  365     Recent Labs   Lab Test  03/15/17   1624  01/02/17   0827   INR  0.90  0.89      RECENT LABS:   ECG:   ECHO:   CXR:      Anesthesia Evaluation     .             ROS/MED HX    ENT/Pulmonary:     (+)allergic rhinitis, asthma , . .    Neurologic:       Cardiovascular:         METS/Exercise Tolerance:  4 - Raking leaves, gardening   Hematologic:         Musculoskeletal:         GI/Hepatic:     (+) Other GI/Hepatic Rectal CA      Renal/Genitourinary:     (+) Nephrolithiasis ,       Endo:     (+) thyroid problem hypothyroidism, Obesity, .      Psychiatric:     (+) psychiatric history depression      Infectious Disease:         Malignancy:         Other:                     Physical Exam  Normal systems: pulmonary and dental    Airway   Mallampati: II  TM distance: >3 FB  Neck ROM: full    Dental     Cardiovascular   Rhythm and rate: regular and normal      Pulmonary     breath sounds clear to auscultation                    Anesthesia Plan      History & Physical Review  History and physical reviewed and following examination; no interval change.    ASA Status:  3 .    NPO Status:  > 8 hours    Plan for General and ETT with Intravenous and Propofol induction. Maintenance will be Balanced.    PONV prophylaxis:  Ondansetron (or other 5HT-3) and Dexamethasone or Solumedrol  Additional equipment: Videolaryngoscope and 2nd IV Hydromorphone gtt  precedex gtt        Postoperative Care  Postoperative pain management:  IV analgesics.      Consents  Anesthetic plan, risks, benefits and alternatives discussed with:  Patient..                          .

## 2017-04-05 NOTE — PROGRESS NOTES
Admission medication history interview status for the 4/5/2017  admission is complete. See EPIC admission navigator for prior to admission medications     Medication history source reliability:Good    Medication history interview source(s):Patient    Medication history resources (including written lists, pill bottles, clinic record):Patient called in medication list prior to surgery    Primary pharmacy.[facility]    Additional medication history information not noted on PTA med list :None    Time spent in this activity: 40 minutes    Prior to Admission medications    Medication Sig Last Dose Taking? Auth Provider   mometasone-formoterol (DULERA) 100-5 MCG/ACT oral inhaler Inhale 2 puffs into the lungs 2 times daily 4/5/2017 at 0400 Yes Reported, Patient   fluticasone (FLONASE) 50 MCG/ACT spray Spray 2 sprays into both nostrils daily as needed for rhinitis or allergies (Congestion) 4/2/2017 at PRN Yes Reported, Patient   Levothyroxine Sodium (SYNTHROID PO) Take 125 mcg by mouth daily 4/5/2017 at 0400 Yes Reported, Patient   Levothyroxine Sodium (SYNTHROID PO) Take 62.5 mcg by mouth once a week (Sundays) 0.5 x 125 mcg = 62.5 mcg (Patient takes this with her regular daily dose of 125 mcg = 187.5 mcg on Sundays) 4/2/2017 at AM Yes Reported, Patient   IBUPROFEN PO Take 800 mg by mouth daily as needed for moderate pain (Headaches) 3/22/2017 at PRN Yes Reported, Patient   Acetaminophen (TYLENOL PO) Take 1,000 mg by mouth daily as needed for mild pain (Headaches) Past Week at PRN Yes Reported, Patient   DiphenhydrAMINE HCl (BENADRYL PO) Take 50 mg by mouth At Bedtime (2 x 25 mg tablet = 50 mg dose) 3/29/2017 at HS Yes Reported, Patient   citalopram (CELEXA) 40 MG tablet Take 1 tablet (40 mg) by mouth daily 4/5/2017 at 0400 Yes Marty Osei MD   buPROPion (WELLBUTRIN SR) 150 MG 12 hr tablet Take 1 tablet (150 mg) by mouth 2 times daily 4/5/2017 at 0400 Yes Guille Torre MD   levalbuterol (XOPENEX HFA) 45 MCG/ACT  inhaler Inhale 1-2 puffs into the lungs every 4 hours as needed for shortness of breath / dyspnea. Past Month at PRN Yes Andreas Godoy MD

## 2017-04-06 LAB
ANION GAP SERPL CALCULATED.3IONS-SCNC: 7 MMOL/L (ref 3–14)
BUN SERPL-MCNC: 9 MG/DL (ref 7–30)
CALCIUM SERPL-MCNC: 8.3 MG/DL (ref 8.5–10.1)
CHLORIDE SERPL-SCNC: 104 MMOL/L (ref 94–109)
CO2 SERPL-SCNC: 27 MMOL/L (ref 20–32)
CREAT SERPL-MCNC: 0.73 MG/DL (ref 0.52–1.04)
ERYTHROCYTE [DISTWIDTH] IN BLOOD BY AUTOMATED COUNT: 16 % (ref 10–15)
GFR SERPL CREATININE-BSD FRML MDRD: 88 ML/MIN/1.7M2
GLUCOSE SERPL-MCNC: 102 MG/DL (ref 70–99)
HCT VFR BLD AUTO: 31.5 % (ref 35–47)
HGB BLD-MCNC: 10.3 G/DL (ref 11.7–15.7)
MAGNESIUM SERPL-MCNC: 2 MG/DL (ref 1.6–2.3)
MCH RBC QN AUTO: 32.7 PG (ref 26.5–33)
MCHC RBC AUTO-ENTMCNC: 32.7 G/DL (ref 31.5–36.5)
MCV RBC AUTO: 100 FL (ref 78–100)
PHOSPHATE SERPL-MCNC: 2.4 MG/DL (ref 2.5–4.5)
PHOSPHATE SERPL-MCNC: 3 MG/DL (ref 2.5–4.5)
PLATELET # BLD AUTO: 257 10E9/L (ref 150–450)
POTASSIUM SERPL-SCNC: 3.8 MMOL/L (ref 3.4–5.3)
RBC # BLD AUTO: 3.15 10E12/L (ref 3.8–5.2)
SODIUM SERPL-SCNC: 138 MMOL/L (ref 133–144)
TROPONIN I SERPL-MCNC: NORMAL UG/L (ref 0–0.04)
WBC # BLD AUTO: 7.5 10E9/L (ref 4–11)

## 2017-04-06 PROCEDURE — 84100 ASSAY OF PHOSPHORUS: CPT | Performed by: COLON & RECTAL SURGERY

## 2017-04-06 PROCEDURE — 99207 ZZC CONSULT E&M CHANGED TO INITIAL LEVEL: CPT | Performed by: PHYSICIAN ASSISTANT

## 2017-04-06 PROCEDURE — 93005 ELECTROCARDIOGRAM TRACING: CPT

## 2017-04-06 PROCEDURE — 25000132 ZZH RX MED GY IP 250 OP 250 PS 637: Performed by: COLON & RECTAL SURGERY

## 2017-04-06 PROCEDURE — 99212 OFFICE O/P EST SF 10 MIN: CPT

## 2017-04-06 PROCEDURE — 99222 1ST HOSP IP/OBS MODERATE 55: CPT | Performed by: PHYSICIAN ASSISTANT

## 2017-04-06 PROCEDURE — 12000007 ZZH R&B INTERMEDIATE

## 2017-04-06 PROCEDURE — 80048 BASIC METABOLIC PNL TOTAL CA: CPT | Performed by: COLON & RECTAL SURGERY

## 2017-04-06 PROCEDURE — 93010 ELECTROCARDIOGRAM REPORT: CPT | Performed by: INTERNAL MEDICINE

## 2017-04-06 PROCEDURE — 25000128 H RX IP 250 OP 636: Performed by: COLON & RECTAL SURGERY

## 2017-04-06 PROCEDURE — 36415 COLL VENOUS BLD VENIPUNCTURE: CPT | Performed by: COLON & RECTAL SURGERY

## 2017-04-06 PROCEDURE — 36415 COLL VENOUS BLD VENIPUNCTURE: CPT | Performed by: PHYSICIAN ASSISTANT

## 2017-04-06 PROCEDURE — 85027 COMPLETE CBC AUTOMATED: CPT | Performed by: COLON & RECTAL SURGERY

## 2017-04-06 PROCEDURE — 25000125 ZZHC RX 250: Performed by: COLON & RECTAL SURGERY

## 2017-04-06 PROCEDURE — 83735 ASSAY OF MAGNESIUM: CPT | Performed by: COLON & RECTAL SURGERY

## 2017-04-06 PROCEDURE — 40000902 ZZH STATISTIC WOC PT EDUCATION, 16-30 MIN

## 2017-04-06 PROCEDURE — 84484 ASSAY OF TROPONIN QUANT: CPT | Performed by: PHYSICIAN ASSISTANT

## 2017-04-06 RX ORDER — KETOROLAC TROMETHAMINE 30 MG/ML
30 INJECTION, SOLUTION INTRAMUSCULAR; INTRAVENOUS EVERY 6 HOURS PRN
Status: DISCONTINUED | OUTPATIENT
Start: 2017-04-06 | End: 2017-04-10 | Stop reason: HOSPADM

## 2017-04-06 RX ADMIN — CITALOPRAM HYDROBROMIDE 40 MG: 40 TABLET ORAL at 09:52

## 2017-04-06 RX ADMIN — KETOROLAC TROMETHAMINE 30 MG: 30 INJECTION, SOLUTION INTRAMUSCULAR at 23:41

## 2017-04-06 RX ADMIN — BUPROPION HYDROCHLORIDE 150 MG: 150 TABLET, FILM COATED, EXTENDED RELEASE ORAL at 09:52

## 2017-04-06 RX ADMIN — BUPROPION HYDROCHLORIDE 150 MG: 150 TABLET, FILM COATED, EXTENDED RELEASE ORAL at 19:49

## 2017-04-06 RX ADMIN — METRONIDAZOLE 500 MG: 500 INJECTION, SOLUTION INTRAVENOUS at 00:36

## 2017-04-06 RX ADMIN — LEVOTHYROXINE SODIUM 125 MCG: 125 TABLET ORAL at 09:52

## 2017-04-06 RX ADMIN — POTASSIUM PHOSPHATE, MONOBASIC AND POTASSIUM PHOSPHATE, DIBASIC 15 MMOL: 224; 236 INJECTION, SOLUTION INTRAVENOUS at 11:24

## 2017-04-06 RX ADMIN — KETOROLAC TROMETHAMINE 30 MG: 30 INJECTION, SOLUTION INTRAMUSCULAR at 17:35

## 2017-04-06 RX ADMIN — DIPHENHYDRAMINE HYDROCHLORIDE 25 MG: 50 INJECTION, SOLUTION INTRAMUSCULAR; INTRAVENOUS at 14:39

## 2017-04-06 RX ADMIN — DIPHENHYDRAMINE HYDROCHLORIDE 25 MG: 50 INJECTION, SOLUTION INTRAMUSCULAR; INTRAVENOUS at 19:46

## 2017-04-06 RX ADMIN — KETOROLAC TROMETHAMINE 30 MG: 30 INJECTION, SOLUTION INTRAMUSCULAR at 11:20

## 2017-04-06 RX ADMIN — HYDROMORPHONE HYDROCHLORIDE: 10 INJECTION, SOLUTION INTRAMUSCULAR; INTRAVENOUS; SUBCUTANEOUS at 21:32

## 2017-04-06 RX ADMIN — METRONIDAZOLE 500 MG: 500 INJECTION, SOLUTION INTRAVENOUS at 09:54

## 2017-04-06 RX ADMIN — HYDROMORPHONE HYDROCHLORIDE: 10 INJECTION, SOLUTION INTRAMUSCULAR; INTRAVENOUS; SUBCUTANEOUS at 06:14

## 2017-04-06 RX ADMIN — ENOXAPARIN SODIUM 40 MG: 40 INJECTION SUBCUTANEOUS at 08:29

## 2017-04-06 RX ADMIN — CIPROFLOXACIN 400 MG: 2 INJECTION, SOLUTION INTRAVENOUS at 08:29

## 2017-04-06 NOTE — PROVIDER NOTIFICATION
Toradol effective for pain management. Notified Kristi LOCKWOOD of borderline UOP for the shift. She stated still ok to give IV toradol if UOP > or = 225. Encourage PO fluids. Continues on maintenance IVF @ 50cc/hr.

## 2017-04-06 NOTE — PROGRESS NOTES
"[facility] Nurse Inpatient Ostomy Assessment      Initial Assessment of ostomy and needs for:  Diverting loop ileostomy      Data:   History:   Per MD note(s):     robotic assisted LAR with DLI for cT3N1 rectal adenocarcinoma    Type of ostomy: New Diverting Loop ileostomy    Stoma assessment:   ? Stoma 1 3/8\", rounded, red,moist, protrudes, central functional lumen  ? A bridge in place?: No  ? Stent(s) in place?: No,   ? Catheter secured? Yes  Mucocutaneous Junction (MCJ): intact with sutures  Peristomal skin:  Intact, no erythema  Abdominal assessment:  Rounded, distended and frim. Stoma sits in small bowl  ? N/G still in place?: No  Output: 50cc sims/green thick bile looking output. Small amt flatus noted  I/O last 3 completed shifts:  In: 6208 [P.O.:3700; I.V.:2508]  Out: 2445 [Urine:2000; Drains:335; Stool:10; Blood:100]    Current pouching system:   Coveo New image flat cut to fit with drainable pouch. Leakage under flat barrier    Pain: tenderness, aching  ? Is pt still on a PCA? Yes    Diet:       Active Diet Order      Full Liquid Diet    Labs:   Recent Labs   Lab Test  04/06/17   0703  03/15/17   1624   10/28/16   0910   ALBUMIN   --    --    --   3.9   HGB  10.3*  11.7   < >  13.1   INR   --   0.90   < >   --    WBC  7.5   --    < >  8.2   A1C   --   5.1   --    --     < > = values in this interval not displayed.           Intervention:     Patient's chart evaluated.      Assessments done today:  Stoma, pouching system and readiness to learn    Education:  Reviewed surgery and general ileostomy management. Demo appliance change. Pt listened and briefly looked @ stoma    Prepared for discharge by: No discharge preparations started    Pt registered for ostomy supply samples?  TBD         Assessment:     Stoma assessment: stoma viable and slowly resuming function    Learning needs/ comprehension: ileostomy management and appliance change    Effectiveness of current pouching/ " "supply plan: needs convexity         Plan:     Plan:   ? Current pouching system: Jareth New image 1 3/8\" convex with ring and high ouptput pouch    Education:  ? Education completed:  Surgery reviewed  ? Educational needs: Ileostomy management  ? Continue to prepare for discharge: No discharge preparations started,     o Supply company TBD  o Do [facility] Nurses recommend home care? yes     Face to face time: 30 minutes  EDU 25 minutes  "

## 2017-04-06 NOTE — PLAN OF CARE
Problem: Goal Outcome Summary  Goal: Goal Outcome Summary  Outcome: Improving  VVS with elevated HR. Pain in lower back controlled with PCA Dilaudid and Toradol. PCA dose changed to 0.2. Urine output borderline at 225cc for 8hrs, fluids encouraged. Scant amount of blooding drainage per rectum. C/o itching, benadryl given. PADMINI drain producing small amount of drainage, dressing CDI. Tollerating clear liquid diet. SBA, walking occasionally. Phosphorus 2.4, replaced per protocol.

## 2017-04-06 NOTE — OP NOTE
DATE OF SERVICE:  04/05/2017.      PREOPERATIVE DIAGNOSIS:  Rectal cancer.      POSTOPERATIVE DIAGNOSIS:  Rectal cancer.      PROCEDURES PERFORMED:   1.  Robotic-assisted low anterior resection.   2.  Laparoscopic-assisted diverting loop ileostomy.      STAFF SURGEON:  Yair Adkins MD      FIRST ASSISTANT:  Ant Rogers MD, [facility] Colorectal Surgery Fellow      ANESTHESIA:  General.      ESTIMATED BLOOD LOSS:  100 mL.      SPECIMENS:  Rectum and distal sigmoid colon.      COMPLICATIONS:  None.      FINDINGS:  There was no evidence of distant metastatic disease.  The patient is obese with a BMI of 44.  We obtained at least a 2 cm margin distally.  We performed a coloanal anastomosis in a stapled fashion.  A pneumatic leak test was negative.  Given her history of radiation, we performed a diverting loop ileostomy.  The anastomosis was done with excellent blood supply and without tension.  We had a complete total mesorectal excision without any defects within the appropriate plane when we examined our specimen.      INDICATIONS:  Yahaira Ramirez is a 41-year-old female who presents with a history of some rectal bleeding and she underwent a colonoscopy and biopsy of a mass in the rectum demonstrated adenocarcinoma.  She was staged with an ultrasound demonstrating a T3 N1 lesion.  She had no evidence of distant metastatic disease.  The lesion was noted to be at 10 cm in the lateral position.  Given her stage III disease, I recommended neoadjuvant chemoradiotherapy followed by surgery.  Her case was discussed at our multidisciplinary tumor board.  She has completed her neoadjuvant chemoradiotherapy.  She completed this approximately 8 weeks ago.  She presents now therefore for elective surgery consisting of a robotic low anterior resection with diverting loop ileostomy.  The risks of surgery, including bleeding, infection, anastomotic leak, injury to adjacent structures, need for a stoma, need for  further surgery, altered bowel, bladder and sexual function postoperatively, urinary tract infection, DVT, complications of the anesthetic and even more serious complications were discussed.  She wishes to proceed.      DESCRIPTION OF PROCEDURE:  After obtaining informed consent and after a full bowel prep, the patient was brought to the operating room and laid supine on the operating table.  General anesthesia was induced.  The patient was intubated without difficulty.  A Olvera catheter was placed under sterile conditions.  An orogastric tube was placed.  Her arms were tucked at her side and she was placed in low modified lithotomy position with all pressure points inspected and padded appropriately.  She was secured to the table with tape across the chest.  The abdomen was shaved, prepped and draped in the usual sterile fashion.  A timeout was undertaken which identified the patient and correct procedure.  We gained entrance to the abdomen using the Veress needle technique and established pneumoperitoneum.  The Veress needle was inserted in the left upper quadrant just underneath the costal margin.  With pneumoperitoneum established, we then placed an 8 mm robotic trocar just above the umbilicus overlying the left rectus.  We then surveyed the abdomen with a robotic camera.  There was no evidence of distant metastatic disease, no evidence of liver metastasis or peritoneal carcinomatosis.  We surveyed the pelvis.  We could faintly see the tattoo underneath the peritoneum in the anterior cul-de-sac.  We placed an additional 3 robotic trocars extending from the left upper quadrant to the right lower quadrant.  We then placed an 8 mm AirSeal assistant trocar in the right upper quadrant.  All of our trocar sites were away from the site proposed by the stoma nurse for the loop ileostomy.  The patient was then placed in steep Trendelenburg position with left side slightly up.  We then brought up the Xi Intuitive surgical  robot and this was docked from the left side.  We placed our camera through the #2 port overlying the left rectus muscle.  We used a tip-up grasper in the left lateral port.  We used our energy device including a monopolar scissors exchanged for an advanced bipolar device through the #3 trocar which was in the right mid abdomen.  We used a small-bowel grasper in the right lateral most trocar.  Through this instrumentation, the left colon was fully mobilized in a lateral to medial fashion.  The small bowel was swept up and the inferior mesenteric artery was elevated off the retroperitoneum.  The mesentery along it was incised to enter into the plane between the mesentery and the retroperitoneum.  The vessel was elevated nicely and the left ureter was identified.  The vessel was then isolated and transected utilizing the vessel sealing device.  We then elevated the remaining left colonic mesentery using a combination of sharp and blunt dissection out to the left lateral abdominal wall and the left pelvic sidewall up to the level of the splenic flexure.  We then turned our attention laterally and used the monopolar scissors to connect our medial dissection to our lateral dissection.  The peritoneum along the rectum was incised bilaterally in the gutters for later mobilization.  We then continued our dissection cephalad and proximally along the bowel.  We then incised the lateral attachments up to the level of the splenic flexure.  We adjusted our flex joints on the robot to obtain better visualization, but unfortunately could not complete a full mobilization of the splenic flexure.      We then attempted to undock the robot and perform a full mobilization of the splenic flexure with the patient in reverse Trendelenburg.  The instruments were then fully removed and the robot was undocked.  The patient was placed in reverse Trendelenburg with her left side fully up.  We then re-docked the robot to our ports and attempted  to mobilize the splenic flexure, but unfortunately, I could not obtain good visualization.  After approximately 20 minutes of attempting to obtain good visualization, I elected to abort this maneuver at this time and to focus on the pelvis and to perform a laparoscopic mobilization of the splenic flexure if needed.  Therefore, the instruments were removed.  The robot was undocked.      The patient was placed back in steep Trendelenburg position and in a completely neutral position with respect to left and right tilt.  The robot arms were then re-docked focusing on the pelvis.  We again used the same instrumentation as described previously.  We then retracted the distal sigmoid colon cephalad and exposed the remaining attachments of the distal sigmoid mesentery to the retroperitoneum.  These were incised to expose the upper mesorectum.  We got nicely into the plane between the mesorectum and the presacral fascia.  This was taken down posteriorly down to the level of Waldeyer's fascia and then the dissection was carried out laterally.  The peritoneum was incised down to the level of the cul-de-sac.  The peritoneum was incised here, getting into the plane between the mesorectum and the vagina.  We then stayed nicely within the mesorectal plane down to the level of the pelvic floor.  We dissected out fully down along the pelvic floor and then through Waldeyer's fascia and carried our dissection to the rectal wall just above the sphincters.  There was a small bleeder from the posterior rectal wall which was controlled using the robotic fenestrated bipolar device.  We therefore completed a full and complete total mesorectal excision.  The level of our transection was confirmed to be at approximately 5 cm with both inspection from the abdominal field as well as palpation with digital rectal exam by myself.  We therefore cleared the rectum off at this level where the mesorectum thinned out close to the rectal wall.  We then  removed our monopolar scissors and undocked this arm.  We removed this trocar and enlarged our incision and replaced this trocar site with a 12 mm trocar to allow the robotic stapling device.  We utilized 2 firings of a 45 mm green load robotic stapler to transect the rectum at this level again at 5 cm above the anal verge.  We then identified the vascular pedicle of our specimen.  We changed out our robotic stapler back to the vessel sealing device.  We transected the mesentery in a radial fashion, fully excising the draining lymph nodes along the inferior mesenteric artery.  The mesentery was transected up to the level of the bowel wall in the distal sigmoid colon.  The bowel was completely isolated here.  We then exchanged out our instruments again for a green load robotic stapler and the colon was transected at this site with a single firing.  We then undocked our robot and evacuated our pneumoperitoneum after a grasper was placed on the specimen.  We made a small Pfannenstiel incision 3 fingerbreadths above the pubic symphysis.  Dissection was taken down with electrocautery.  The fascia was incised in a lateral fashion.  The fascia was then elevated off the rectus muscles superiorly and inferiorly.  The peritoneum was incised in the midline after  the rectus muscles.  We utilized an Raji wound retractor for excellent exposure and the specimen was exteriorized.      We then inspected our specimen.  We had an excellent complete total mesorectal excision staying nicely within the appropriate plane without any defects within our mesorectum noted.  I placed a single stitch on the vascular pedicle and a double stitch on the proximal staple line.  I then incised along the staple line distally to open up the specimen to inspect the residual cancer.  An ulcer was noted approximately 2-3 cm just proximal to the staple line.  Specimen was then sent off.  I rescrubbed into the abdominal field.  The proximal  staple line on our colon was excised.  We placed a 2-0 double-armed suture in a pursestring fashion circumferentially around the cut edge of the bowel.  This nicely accepted a 28 mm EEA.  The suture was tied down to bring the bowel wall close to the shaft of the anvil.  A second suture was then placed in a reinforcing fashion and this brought the bowel wall tight to the shaft of the anvil.  This was then reintroduced into the abdomen.  We then temporarily closed the abdomen by twisting our Raji wound retractor.  Pneumoperitoneum was re-established.  We felt that the bowel came down into the pelvis nicely to the level of the pelvic floor without any tension and with excellent blood supply and therefore elected not to complete a full mobilization of the splenic flexure.  The patient was placed in Trendelenburg position.  We re-docked the robot as described above.  Instead of an energy device, we utilized a fenestrated bipolar instrument.  Dr. Rogers then went to the perineal field.  He performed a digital exam.  The anus was dilated with serial dilators.  He then placed the stapler via the anus to the top of the rectal stump.  He nicely deployed the spike just lateral to the staple line.  The staple line traversed the rectum in an anterior to posterior fashion.  I then  the anvil to the spike of the stapler.  Stapler was closed and fired and easily retrieved.  The anastomotic rings were noted to be intact and these were sent as a separate specimen.  A pneumatic leak test was performed and was negative.  We did inspect the proximal conduit to ensure there was no twisting and we inspected the cut edge of the mesentery all the way up to where it connected to the retroperitoneum.  A 19-Indonesian Jm drain was brought through the right lateral most port site.  I utilized my robotic instruments to place this down into the pelvis.      We closed the 12 mm trocar site, as well as the 8 mm AirSeal device port site,  utilizing the Abilio-Yamilex technique utilizing an 0 Vicryl suture placed in a figure-of-eight fashion utilizing the tip-up grasper and the robot for assistance with this maneuver.  I then inspected the small bowel with this grasper and identified the antimesenteric fat pad of the terminal ileum as it entered into the cecum.  I then undocked the robot and removed our tip-up grasper out of our left lateral most port.  We used a laparoscopic bowel grasper to grasp the bowel at the terminal ileum approximately 30 cm proximal to the ileocecal valve.  Pneumoperitoneum was then evacuated.  We exposed the abdomen and exposed the small bowel through our small Pfannenstiel incision.  We then made a skin incision around the site marked by the stoma nurse.  Dissection was taken down with electrocautery and the fat underneath the stoma site was excised.  The fascia was exposed and incised in a craniocaudal fashion.  I placed my fingers into the abdomen through the Pfannenstiel site and palpated the abdominal wall where we planned to make our loop ileostomy.  The fascia was incised in a craniocaudal fashion.  The muscle fibers were bluntly spread and then the posterior fascia was incised over my fingers.  A Steven clamp was placed from the ileostomy opening through the abdominal cavity to our Pfannenstiel wound.  This allowed us to deliver the small bowel through the trephine.  We then inspected the small bowel through the ileostomy site and found that we had inadvertently twisted the small bowel as it came through the abdominal wall.  Therefore, this maneuver was repeated with the bowel in the correct orientation after we had marked the bowel proximally and distally with 3-0 Vicryl sutures after confirming the correct orientation.  Therefore, we brought the small bowel through the trephine in the correct orientation.  It came up nicely several centimeters above the skin.  The peritoneum of the Pfannenstiel incision was  closed with running 2-0 Vicryl.  Skin and subcutaneous fat were irrigated.  The fascia was reapproximated with 2 running sutures of looped 0 PDS.  Skin at all sites was closed with 4-0 Monocryl in a subcuticular fashion and Dermabond was used as a dressing.  We then matured our loop ileostomy by making a transverse enterotomy.  It was matured in a Nayana fashion with multiple interrupted sutures of 3-0 Vicryl.  We matured and Brooked both limbs.  A stoma appliance was applied.  She was placed in a supine position.  The drapes were taken down.  She was awakened, extubated and transferred to the PACU in stable condition.  Lap, sponge and needle counts correct at the end of the case x2.         YAIR ADKINS MD             D: 2017 16:54   T: 2017 12:01   MT: TS      Name:     SATYA OSORIO   MRN:      [id number]        Account:        [id number]   :      1975           Procedure Date: 2017      Document: [id number]       cc: Yair Adkins MD

## 2017-04-06 NOTE — PROVIDER NOTIFICATION
Pt c/o SOB, difficulty catching her breath. C/o lower back pain 8 out of 10. Sating high 90's 1-2 L/NC for comfort. LS clear. HR tachy. Toradol given. On dilaudid PCA at 0.3. Kristi LOCKWOOD notified. EKG ordered. Kristi stated she will update Dr. Adkins.

## 2017-04-06 NOTE — PROGRESS NOTES
"COLON AND RECTAL SURGERY PROGRESS NOTE    SUBJECTIVE: No acute events.  Has some head pain.  Abdominal pain mild to moderate.  No nausea or vomiting.  Tolerating some water.      OBJECTIVE:  /56  Pulse 91  Temp 97.7  F (36.5  C) (Oral)  Resp 16  Ht 1.651 m (5' 5\")  Wt 120 kg (264 lb 9 oz)  SpO2 98%  BMI 44.03 kg/m2  I/O last 3 completed shifts:  In: 5528 [P.O.:1400; I.V.:4128]  Out: 1565 [Urine:1200; Drains:265; Blood:100]    Awake and alert  RRR  CTA b/l  Soft, minimal tenderness, non distended  PADMINI - serosanguinous  Stoma: some retraction but pink and healthy, bilious effluent in bag  Incisions c/d/i  No c/c/e    Labs: pending    ASSESSMENT/PLAN:  POD 1 s/p robotic assisted LAR with DLI for cT3N1 rectal adenocarcinoma    - Clear liquid diet for now  - Increase activity  - PCA, IV NSAIDs for pain  - Incentive spirometer  - Continue juarez for 4 days postoperatively due to pelvic dissection  - Routine stoma cares  - Follow up labs  - DVT prophylaxis - will need 28 days total therapy due to abdominal/pelvic malignancy  - Await pathology    Ant Lopez MD  Colorectal Fellow  Pager     [facility] Staff  Seen and examined.  Abdomen benign.  Noted a little pain with deep breaths.  Labs look good.  Will add toradol for pain.  Recheck labs tomorrow.  Full liquid diet.  Replace phos.    Yair Adkins MD  Colorectal Surgery  [phone] (office)  [phone] (pager)  [email or web address]    "

## 2017-04-06 NOTE — CONSULTS
"HOSPITALIST CONSULTATION NOTE       REQUESTING PROVIDER:  Anu Stauffer PA-C.      REASON FOR CONSULTATION:  EKG changes.       History is provided by the patient.      HISTORY OF PRESENT ILLNESS:  Yahaira Ramirez is a 41-year-old female with history of major depressive disorder, hypothyroidism, mild intermittent asthma, and rectal adenocarcinoma status post robotic-assisted low anterior resection and laparoscopic-assisted diverting loop ileostomy.  This was performed by Dr. Yair Adkins on 04/05/2017.  Today is postoperative day #1.  She is admitted under the care of the Surgical Service.  The procedure was tolerated well with an estimated blood loss of 100 mL.      This afternoon the patient was complaining of generalized upper back and neck pain that she attributed to lying flat in one spot for a prolonged period of time which has happened to her in the past.  This improved shortly after taking Toradol and has since resolved.  This was in close proximity to feeling like she could not take a deep breath in, not because it hurt to breath, but would exacerbate her abdominal pain.  She attributes this sensation as triggering a \"panic attack\" and felt like she could not catch her breath.  She denies chest pain, palpitations, pleuritic pain, nausea and vomiting.  She did not feel markedly weak or fatigued, and she also did not feel cold or clammy.  An EKG was obtained and showed nonspecific T-wave changes in the anterior and inferior leads compared to her EKG from 3 months ago.  Her symptoms lasted for approximately 10-15 minutes.  Tells me she has been told she has a fast heart rate in the past and told she has PVCs.  She was diagnosed with the rectal adenocarcinoma in 12/2016 and has no known personal history of blood clots.  She has no family history of cardiac disease, venous thromboembolism or stroke.      Presently the patient is evaluated in her hospital room with her father at the bedside.  She denies neck " pain, back pain, chest pain, difficulty breathing, pleuritic pain, abdominal pain, nausea and vomiting.  She does endorse having a brief left lower extremity pain that resolved after the nurse adjusted her PCDs earlier in the day and none present with palpation on my exam.     PAST MEDICAL HISTORY:   1.  Rectal adenocarcinoma.  Diagnosed in 2016, status post 5 chemoradiation rounds.     2.  Mild intermittent asthma.   3.  Hypothyroidism.   4.  Major depressive disorder.      PAST SURGICAL HISTORY:   1.  Cryocautery of the cervix.   2.  Laparoscopic cholecystectomy.   3.  Resection of mass on an arm.   4.  Breast reduction.      FAMILY HISTORY:   1.  Paternal grandmother  of pancreatic cancer.   2.  Maternal grandfather  of oat cell cancer.     3.  No known family history of coronary artery disease, VTE, CVA.      SOCIAL HISTORY:     1.  Former smoker, having quit 10 years ago.  Previous approximate 15-17 pack year history of smoking.   2.  Drinks approximately 2-3 alcoholic beverages in a year.   3.  Lives in a [facility] in [place], [place].  She rents to her mother.      PRIOR TO ADMISSION MEDICATIONS:   1.  Tylenol 1000 mg daily p.r.n.   2.  Wellbutrin- mg b.i.d.   3.  Citalopram 40 mg daily.   4.  Diphenhydramine 50 mg at bedtime.   5.  Flonase 50 mcg per actuation.   6.  Ibuprofen 800 mg daily as needed.   7.  Xopenex 45 mcg per actuation 1-2 puffs every 4 hours as needed.   8.  Synthroid 125 mcg daily.   9.  Synthroid 62.5 mcg once a week on Sundays.   10.  Dulera 100/5 mcg per actuation 2 puffs b.i.d.      ALLERGIES:   1.  Sulfa drugs cause hives.   2.  Adhesive tape causes itching and a rash.   3.  Cats.    4.  Pollen extract.      REVIEW OF SYSTEMS:  A complete review of systems was performed and is negative except for that noted in the HPI.      PHYSICAL EXAMINATION:   VITAL SIGNS:  Blood pressure 139/84, pulse is 105, temperature 98.2 degrees Fahrenheit, respirations are 18.  Oxygen  saturation is 100%.  Height is 5 feet 5 inches, weight 120 kilograms giving her a BMI of 44.   GENERAL:  A pleasant female who looks stated age and appears comfortable sitting upright in bed.   SKIN:  Warm, dry.  No rashes or lesions on exposed skin.   HEENT:  Normocephalic, atraumatic.   NECK:  Supple.  No cervical lymphadenopathy.   CHEST:  Breath sounds clear to auscultation.  No increased work of breathing on 2 liters supplemental oxygen by nasal cannula.   CARDIOVASCULAR:  Regular rate and rhythm.  No rub or murmur appreciated.  No peripheral edema.  DP pulses detected and symmetric.   ABDOMEN:  Soft.  Stoma in the right upper quadrant with brown sticky appearing output in the stoma bag.  The dressing is left intact and appears clean, dry and intact.  PADMINI drain with a small amount of serous bloody output.   MUSCULOSKELETAL:  Moves all 4 extremities.  No calf tenderness with palpation.   NEUROLOGIC:  Alert and oriented x3.  Cranial nerves II-XII grossly intact.   PSYCHIATRIC:  Affect and mood congruent.   GENITOURINARY:  Olvera catheter in place with small amount of darker yellow-appearing urine.      LABORATORY DATA:  BMP remarkable for mildly decreased calcium at 8.3 and phosphorus of 2.4.  Kidney functioning within normal limits.  CB remarkable for hemoglobin of 10.3.      IMAGING:  This was reviewed in Epic.      EKG shows sinus tachycardia with a rate of 101.  There are nonspecific T-wave changes in the inferior and anterior leads.      ASSESSMENT AND PLAN:  Yahaira Ramirez is a 41-year-old female with history of mild intermittent asthma, major depressive disorder, hypothyroidism and rectal adenocarcinoma who was admitted under the Surgical Service for routine postoperative cares of elective robotic-assisted lower anterior resection with diverting loop ileostomy for rectal adenocarcinoma.  The Hospitalist Service is consulted regarding new electrocardiogram changes.  Electrocardiogram was obtained today after  the patient complained of upper back pain, neck pain and feeling like she could not catch her breath.     Abnormal electrocardiogram: New T-wave changes in the anterior and inferior leads compared to EKG 3 months ago.  It does appear that the baseline has some aberrance.  Certainly pulmonary embolism is a consideration and at this point would expect a D-dimer to be elevated given surgery yesterday.  Obesity, recent surgery, and CA are risk factors and furthermore she is mildly tachycardic and mildly hypoxic on room air. My suspicion for blood clot and acs is low based on further discussion of her symptoms. Her upper back pain and neck pain is similar to previous and attributed to staying in one position for too long. No assoc chest pain or difficulty breathing, rather she did not want to take a deep breath because it exacerbated her abdominal pain from the recent surgical procedure below.  Her back and neck pain resolved with Toradol and sounds msk in nature.  She explains she is mildly tachycardic in the low 100s at baseline and her history of asthma, recent surgery, and previous 15-17 pack year history of smoking are all likely contributing to her mild hypoxia on room air.  -- At this time, will not obtain a CT but low threshold if markedly worsening tachycardia or hypoxia.   -- Monitor on telemetry.   -- Trend serial troponins.   -- Obtain echocardiogram.   -- Will recheck electrocardiogram in the a.m.   -- Increase intravenous fluids to 100 mL per hour given tachycardia and low urine output.     Rectal adenocarcinoma cT3 N1:  Oncologist is Dr. Tony Gallegos. S/p 5-FU with radiation.   -- Postop day #1 status post robotic LAR with DLI.     -- Pathology pending.   -- Routine postop cares.  Deferred to primary service.  This includes pain management, intravenous fluids, and deep venous thrombosis prophylaxis.    Hypothyroidism:  Continue prior to admission levothyroxine 125 mcg daily and 62.5 mcg daily on Sundays.      Mild intermittent asthma:  Currently without wheezing, difficulty breathing.  She is on 2 liters supplemental oxygen by nasal cannula.   -- Encourage incentive spirometry.   -- Continue prior to admission Dulera 2 puffs b.i.d.   -- Continue prior to admission Flonase 2 sprays daily p.r.n.   -- Continue Xopenex 1-2 puffs every 4 hours as needed.     Major depressive disorder:  Appears compensated.   -- Continue prior to admission Wellbutrin- mg b.i.d.   -- Continue prior to admission Citalopram 40 mg daily.     Deep venous thrombosis prophylaxis:  Enoxaparin 40 mg subcutaneous every 24 hours per primary service.      CODE STATUS:  Full code.      ATTESTATION:  The patient was discussed with Dr. Katerin Leon of the Hospitalist Service who is in agreement with my assessment and plan of care as noted above.      The Hospitalist Service thanks you for this consultation.  We will continue to follow along with you.      Thank you, on insulin.         KATERIN LEON MD       As dictated by JOANNA ULMEN BARTHELL, PA-C            D: 2017 16:31   T: 2017 18:01   MT: MARSHALL      Name:     SATYA OSORIO   MRN:      [id number]        Account:       [id number]   :      1975           Consult Date:  2017      Document: [id number]

## 2017-04-06 NOTE — PLAN OF CARE
Problem: Goal Outcome Summary  Goal: Goal Outcome Summary  Outcome: Improving  Arrived to 33 at about 1630. VSS, 2L O2 NC. A&O. Pain rated 2-3/10 controlled with dilaudid PCA. Lap sites and transverse abd site CDI with liquid bandage. PADMINI intact with SS drainage. Ileostomy intact with scant SS drainage.

## 2017-04-07 ENCOUNTER — APPOINTMENT (OUTPATIENT)
Dept: CARDIOLOGY | Facility: CLINIC | Age: 42
DRG: 330 | End: 2017-04-07
Attending: PHYSICIAN ASSISTANT
Payer: COMMERCIAL

## 2017-04-07 LAB
ANION GAP SERPL CALCULATED.3IONS-SCNC: 8 MMOL/L (ref 3–14)
BASOPHILS # BLD AUTO: 0 10E9/L (ref 0–0.2)
BASOPHILS NFR BLD AUTO: 0.1 %
BUN SERPL-MCNC: 8 MG/DL (ref 7–30)
CALCIUM SERPL-MCNC: 8.4 MG/DL (ref 8.5–10.1)
CHLORIDE SERPL-SCNC: 99 MMOL/L (ref 94–109)
CO2 SERPL-SCNC: 27 MMOL/L (ref 20–32)
COPATH REPORT: NORMAL
CREAT SERPL-MCNC: 0.7 MG/DL (ref 0.52–1.04)
DIFFERENTIAL METHOD BLD: ABNORMAL
EOSINOPHIL # BLD AUTO: 0.8 10E9/L (ref 0–0.7)
EOSINOPHIL NFR BLD AUTO: 8.6 %
ERYTHROCYTE [DISTWIDTH] IN BLOOD BY AUTOMATED COUNT: 15.3 % (ref 10–15)
GFR SERPL CREATININE-BSD FRML MDRD: ABNORMAL ML/MIN/1.7M2
GLUCOSE SERPL-MCNC: 98 MG/DL (ref 70–99)
HCT VFR BLD AUTO: 27.8 % (ref 35–47)
HGB BLD-MCNC: 9.3 G/DL (ref 11.7–15.7)
IMM GRANULOCYTES # BLD: 0 10E9/L (ref 0–0.4)
IMM GRANULOCYTES NFR BLD: 0.3 %
LYMPHOCYTES # BLD AUTO: 0.6 10E9/L (ref 0.8–5.3)
LYMPHOCYTES NFR BLD AUTO: 6.4 %
MCH RBC QN AUTO: 32.9 PG (ref 26.5–33)
MCHC RBC AUTO-ENTMCNC: 33.5 G/DL (ref 31.5–36.5)
MCV RBC AUTO: 98 FL (ref 78–100)
MONOCYTES # BLD AUTO: 0.8 10E9/L (ref 0–1.3)
MONOCYTES NFR BLD AUTO: 8.9 %
NEUTROPHILS # BLD AUTO: 7 10E9/L (ref 1.6–8.3)
NEUTROPHILS NFR BLD AUTO: 75.7 %
NRBC # BLD AUTO: 0 10*3/UL
NRBC BLD AUTO-RTO: 0 /100
PLATELET # BLD AUTO: 201 10E9/L (ref 150–450)
POTASSIUM SERPL-SCNC: 3.5 MMOL/L (ref 3.4–5.3)
RBC # BLD AUTO: 2.83 10E12/L (ref 3.8–5.2)
SODIUM SERPL-SCNC: 134 MMOL/L (ref 133–144)
TROPONIN I SERPL-MCNC: NORMAL UG/L (ref 0–0.04)
WBC # BLD AUTO: 9.2 10E9/L (ref 4–11)

## 2017-04-07 PROCEDURE — 93005 ELECTROCARDIOGRAM TRACING: CPT

## 2017-04-07 PROCEDURE — 25000128 H RX IP 250 OP 636: Performed by: COLON & RECTAL SURGERY

## 2017-04-07 PROCEDURE — 80048 BASIC METABOLIC PNL TOTAL CA: CPT | Performed by: COLON & RECTAL SURGERY

## 2017-04-07 PROCEDURE — 99232 SBSQ HOSP IP/OBS MODERATE 35: CPT | Performed by: INTERNAL MEDICINE

## 2017-04-07 PROCEDURE — 93306 TTE W/DOPPLER COMPLETE: CPT | Mod: 26 | Performed by: INTERNAL MEDICINE

## 2017-04-07 PROCEDURE — 99212 OFFICE O/P EST SF 10 MIN: CPT

## 2017-04-07 PROCEDURE — 36415 COLL VENOUS BLD VENIPUNCTURE: CPT | Performed by: COLON & RECTAL SURGERY

## 2017-04-07 PROCEDURE — 25000132 ZZH RX MED GY IP 250 OP 250 PS 637: Performed by: INTERNAL MEDICINE

## 2017-04-07 PROCEDURE — 25800025 ZZH RX 258: Performed by: PHYSICIAN ASSISTANT

## 2017-04-07 PROCEDURE — 93010 ELECTROCARDIOGRAM REPORT: CPT | Performed by: INTERNAL MEDICINE

## 2017-04-07 PROCEDURE — 25000132 ZZH RX MED GY IP 250 OP 250 PS 637: Performed by: PHYSICIAN ASSISTANT

## 2017-04-07 PROCEDURE — 25500064 ZZH RX 255 OP 636: Performed by: COLON & RECTAL SURGERY

## 2017-04-07 PROCEDURE — 85025 COMPLETE CBC W/AUTO DIFF WBC: CPT | Performed by: COLON & RECTAL SURGERY

## 2017-04-07 PROCEDURE — 84484 ASSAY OF TROPONIN QUANT: CPT | Performed by: PHYSICIAN ASSISTANT

## 2017-04-07 PROCEDURE — 25000132 ZZH RX MED GY IP 250 OP 250 PS 637: Performed by: COLON & RECTAL SURGERY

## 2017-04-07 PROCEDURE — 36415 COLL VENOUS BLD VENIPUNCTURE: CPT | Performed by: PHYSICIAN ASSISTANT

## 2017-04-07 PROCEDURE — 40000264 ECHO COMPLETE WITH LUMASON

## 2017-04-07 PROCEDURE — 12000007 ZZH R&B INTERMEDIATE

## 2017-04-07 RX ORDER — ACETAMINOPHEN 325 MG/1
650 TABLET ORAL EVERY 4 HOURS PRN
Status: DISCONTINUED | OUTPATIENT
Start: 2017-04-07 | End: 2017-04-10 | Stop reason: HOSPADM

## 2017-04-07 RX ORDER — OXYCODONE HYDROCHLORIDE 5 MG/1
5-10 TABLET ORAL EVERY 4 HOURS PRN
Status: DISCONTINUED | OUTPATIENT
Start: 2017-04-07 | End: 2017-04-07

## 2017-04-07 RX ORDER — OXYCODONE AND ACETAMINOPHEN 5; 325 MG/1; MG/1
1-2 TABLET ORAL EVERY 6 HOURS PRN
Status: DISCONTINUED | OUTPATIENT
Start: 2017-04-07 | End: 2017-04-10 | Stop reason: HOSPADM

## 2017-04-07 RX ORDER — IBUPROFEN 200 MG
400-600 TABLET ORAL 3 TIMES DAILY PRN
Status: DISCONTINUED | OUTPATIENT
Start: 2017-04-07 | End: 2017-04-07

## 2017-04-07 RX ORDER — HYDROMORPHONE HYDROCHLORIDE 1 MG/ML
.3-.5 INJECTION, SOLUTION INTRAMUSCULAR; INTRAVENOUS; SUBCUTANEOUS
Status: DISCONTINUED | OUTPATIENT
Start: 2017-04-07 | End: 2017-04-10 | Stop reason: HOSPADM

## 2017-04-07 RX ORDER — IBUPROFEN 400 MG/1
800 TABLET, FILM COATED ORAL 3 TIMES DAILY PRN
Status: DISCONTINUED | OUTPATIENT
Start: 2017-04-07 | End: 2017-04-10 | Stop reason: HOSPADM

## 2017-04-07 RX ADMIN — SODIUM CHLORIDE, POTASSIUM CHLORIDE, SODIUM LACTATE AND CALCIUM CHLORIDE: 600; 310; 30; 20 INJECTION, SOLUTION INTRAVENOUS at 02:10

## 2017-04-07 RX ADMIN — KETOROLAC TROMETHAMINE 30 MG: 30 INJECTION, SOLUTION INTRAMUSCULAR at 13:54

## 2017-04-07 RX ADMIN — KETOROLAC TROMETHAMINE 30 MG: 30 INJECTION, SOLUTION INTRAMUSCULAR at 07:42

## 2017-04-07 RX ADMIN — ENOXAPARIN SODIUM 40 MG: 40 INJECTION SUBCUTANEOUS at 09:03

## 2017-04-07 RX ADMIN — OXYCODONE HYDROCHLORIDE AND ACETAMINOPHEN 2 TABLET: 5; 325 TABLET ORAL at 20:08

## 2017-04-07 RX ADMIN — OXYCODONE HYDROCHLORIDE 10 MG: 5 TABLET ORAL at 10:42

## 2017-04-07 RX ADMIN — SULFUR HEXAFLUORIDE 2 ML: KIT at 10:15

## 2017-04-07 RX ADMIN — BUPROPION HYDROCHLORIDE 150 MG: 150 TABLET, FILM COATED, EXTENDED RELEASE ORAL at 09:03

## 2017-04-07 RX ADMIN — CITALOPRAM HYDROBROMIDE 40 MG: 40 TABLET ORAL at 09:03

## 2017-04-07 RX ADMIN — ACETAMINOPHEN 650 MG: 325 TABLET ORAL at 14:01

## 2017-04-07 RX ADMIN — IBUPROFEN 800 MG: 400 TABLET ORAL at 20:07

## 2017-04-07 RX ADMIN — BUPROPION HYDROCHLORIDE 150 MG: 150 TABLET, FILM COATED, EXTENDED RELEASE ORAL at 20:22

## 2017-04-07 RX ADMIN — DIPHENHYDRAMINE HYDROCHLORIDE 25 MG: 50 INJECTION, SOLUTION INTRAMUSCULAR; INTRAVENOUS at 02:08

## 2017-04-07 RX ADMIN — LEVOTHYROXINE SODIUM 125 MCG: 125 TABLET ORAL at 09:03

## 2017-04-07 RX ADMIN — HYDROMORPHONE HYDROCHLORIDE: 10 INJECTION, SOLUTION INTRAMUSCULAR; INTRAVENOUS; SUBCUTANEOUS at 06:11

## 2017-04-07 NOTE — PLAN OF CARE
VSS. Alert & oriented. Bowel sounds active. Seen by C nurse. PCA discontinued at 1040. PO oxycodone administered at 1040. PADMINI discontinued at 1340. Ambulated with standby assist x 2 on this shift.

## 2017-04-07 NOTE — PROGRESS NOTES
[facility]    Hospitalist Progress Note  Sam Lassiter MD    Assessment & Plan     41-year-old female with history of mild intermittent asthma, major depressive disorder, hypothyroidism and rectal adenocarcinoma, who was admitted under the Surgical Service. She underwent elective robotic-assisted lower anterior resection with diverting loop ileostomy for rectal adenocarcinoma on 4/5/17. Hospitalist Service was consulted regarding new electrocardiogram changes of non-specific T wave changes in the anterior and inferior leads. Electrocardiogram was obtained today after the patient complained of upper back pain, neck pain and feeling like she could not catch her breath.      1. S/P Sinus tachycardia: Repeat EKG done on 4/7/17 revealed, normal sinus rhythm. Serial troponin is negative.     2. POD# 2 Robotic assisted low anterior resection and diverting loop ileostomy, due to rectal cancer: Oncologist is Dr. Tony Gallegos. S/P 5-FU with radiation. For management per Colorectal Surgeon. Advanced diet to low residue diet today.     3. Hypothyroidism: Continue levothyroxine 125 mcg daily and 62.5 mcg on Sundays.      4. Mild intermittent asthma:  Controlled. Continue incentive spirometry prn..   Continue Dulera 2 puffs b.i.d, Flonase 2 sprays daily p.r.n and Xopenex 1-2 puffs every 4 hours as needed.      5. Major depressive disorder: Continue Wellbutrin- mg b.i.d and Citalopram 40 mg daily.        DVT Prophylaxis: Enoxaprain (Lovenox) SQ  Code Status: Full Code    Disposition: Expected discharge, per colorectal Surgeon.      Interval History   The pt reported having a headache today and stated that Ibuprofen worked best for her headache in the past.   No complaints of chest pain/sob/palpitations. She tolerated a full liquid diet.    -Data reviewed today: I reviewed all new labs and imaging results over the last 24 hours. I personally reviewed no images or EKG's today.    Physical Exam    Temp: 97.9  F (36.6  C) Temp src: Oral BP: 130/66 Pulse: 101 Heart Rate: 107 Resp: 16 SpO2: 97 % O2 Device: Nasal cannula Oxygen Delivery: 2 LPM  Vitals:    04/05/17 0621   Weight: 120 kg (264 lb 9 oz)     Vital Signs with Ranges  Temp:  [97.6  F (36.4  C)-99  F (37.2  C)] 97.9  F (36.6  C)  Pulse:  [101-107] 101  Heart Rate:  [107-116] 107  Resp:  [16-22] 16  BP: (114-139)/(61-84) 130/66  SpO2:  [97 %-100 %] 97 %  I/O last 3 completed shifts:  In: 2523 [P.O.:1800; I.V.:723]  Out: 2030 [Urine:1875; Drains:55; Stool:100]    Constitutional: Adult white female, awake, cooperative, no apparent distress, O2 Sats 96% on RA  Respiratory: BS vesicular bilaterally, no crackles or wheezing  Cardiovascular: S1 and S2, reg, no murmur noted  GI: Obese+, non-tender, ileostomy in situ+, BS present+  Skin/Integumen: No rashes  Extremities: No pedal edema    Medications     lactated ringers 75 mL/hr at 04/07/17 0749       buPROPion  150 mg Oral BID     citalopram  40 mg Oral Daily     levothyroxine (SYNTHROID/LEVOTHROID) tablet 125 mcg  125 mcg Oral Daily     mometasone-formoterol  2 puff Inhalation BID     sodium chloride (PF)  3 mL Intracatheter Q8H     enoxaparin  40 mg Subcutaneous Q24H     [START ON 4/9/2017] levothyroxine (SYNTHROID/LEVOTHROID) half-tab 62.5 mcg  62.5 mcg Oral Once per day on Sun       Data     Recent Labs  Lab 04/07/17  0705 04/07/17  0055 04/06/17  1715 04/06/17  0703 04/05/17  1735   WBC 9.2  --   --  7.5  --    HGB 9.3*  --   --  10.3*  --    MCV 98  --   --  100  --      --   --  257 283     --   --  138  --    POTASSIUM 3.5  --   --  3.8  --    CHLORIDE 99  --   --  104  --    CO2 27  --   --  27  --    BUN 8  --   --  9  --    CR 0.70  --   --  0.73 0.82   ANIONGAP 8  --   --  7  --    KATARZYNA 8.4*  --   --  8.3*  --    GLC 98  --   --  102*  --    TROPI  --  <0.015The 99th percentile for upper reference range is 0.045 ug/L.  Troponin values in the range of 0.045 - 0.120 ug/L may be  associated with risks of adverse clinical events. <0.015The 99th percentile for upper reference range is 0.045 ug/L.  Troponin values in the range of 0.045 - 0.120 ug/L may be associated with risks of adverse clinical events.  --   --        No results found for this or any previous visit (from the past 24 hour(s)).

## 2017-04-07 NOTE — PHARMACY
Lovenox covered at $10    Thank you,  Demetrio Melendez CPhT  [facility] Discharge Pharmacy Liaison  [phone]

## 2017-04-07 NOTE — PROGRESS NOTES
"[facility] Nurse Inpatient Ostomy Assessment      Initial Assessment of ostomy and needs for:  Diverting loop ileostomy      Data:   History:   Per MD note(s):     robotic assisted LAR with DLI for cT3N1 rectal adenocarcinoma    Type of ostomy: New Diverting Loop ileostomy    Stoma assessment:   ? Stoma 1 3/8\", rounded, red,moist, protrudes, central functional lumen  ? A bridge in place?: No  ? Stent(s) in place?: No,   Mucocutaneous Junction (MCJ): intact with sutures  Peristomal skin:  Intact, no erythema  Abdominal assessment:  Rounded, distended and frim. Stoma sits in small bowl  ? N/G still in place?: No  Output: 100cc sims/green thick bile looking output. Small amt flatus noted         I/O last 3 completed shifts:  In: 2523 [P.O.:1800; I.V.:723]  Out: 2030 [Urine:1875; Drains:55; Stool:100]    Current pouching system:   Avanzit New image convex cut to fit with ring and high output pouch. No leakage under convex    ? Pain: tenderness, aching. Pt is complaining of Headache  ?  Is pt still on a PCA? Yes    Diet:       Active Diet Order      Full Liquid Diet    Labs:   Recent Labs   Lab Test  04/07/17   0705   03/15/17   1624   10/28/16   0910   ALBUMIN   --    --    --    --   3.9   HGB  9.3*   < >  11.7   < >  13.1   INR   --    --   0.90   < >   --    WBC  9.2   < >   --    < >  8.2   A1C   --    --   5.1   --    --     < > = values in this interval not displayed.             Intervention:     Patient's chart evaluated.      Assessments done today:  Stoma, pouching system and readiness to learn    Education:  Reviewed surgery and general ileostomy management. Demo appliance change. Pt observed appliance change today    Prepared for discharge by: No discharge preparations started    Pt registered for ostomy supply samples?  TBD         Assessment:     Stoma assessment: stoma viable and slowly resuming function    Learning needs/ comprehension: ileostomy management and appliance " "change    Effectiveness of current pouching/ supply plan: continue convexity         Plan:     Plan:   ? Current pouching system: Jareth New image 1 3/8\" convex with ring and high ouptput pouch    Education:  ? Education completed:  Surgery reviewed  ? Educational needs: Ileostomy management  ? Continue to prepare for discharge: No discharge preparations started,                        Supplies @ bedside  o Supply company TBD  o Do WO Nurses recommend home care? yes     Face to face time: 30 minutes  EDU 25 minutes  "

## 2017-04-07 NOTE — PLAN OF CARE
Problem: Goal Outcome Summary  Goal: Goal Outcome Summary  Outcome: No Change  VSS. pca pain ok, judith fulls, inc cdi, small liq bm small flatus, denies nausea. Up +1 assist. Tele SR

## 2017-04-07 NOTE — PROGRESS NOTES
Colon and Rectal Surgery Progress Note          Assessment and Plan:   POD #2 robotic LAR with diverting loop ileostomy    Overall feeling better.  Pain better.  Tolerating diet.  Troponins negative.  Urine output improved HR better    Plan to d/c drain today.  Low residue diet  Decrease IV fluids  D/c pca  Start oral pain meds  Continue lovenox - will need for 30 days at discharge  Await path    Yair Adkins MD  Colorectal Surgery  [phone] (office)  [phone] (pager)  [email or web address]             Interval History:   Felling better.  Breathing fine.  Urine clear yellow - about 500 cc in bag. Tolerated diet.              Physical Exam:   Vitals were reviewed  Patient Vitals for the past 24 hrs:   BP Temp Temp src Pulse Heart Rate Resp SpO2   04/07/17 0100 115/61 98.7  F (37.1  C) Oral - 107 17 98 %   04/06/17 1939 122/66 99  F (37.2  C) Oral 107 109 18 97 %   04/06/17 1608 114/69 97.6  F (36.4  C) Oral 107 111 18 97 %   04/06/17 1429 - - - - - 18 -   04/06/17 1200 - - - - - 18 -   04/06/17 1120 - - - - - 22 -   04/06/17 1118 139/84 98.2  F (36.8  C) Oral 105 116 16 100 %   04/06/17 0829 - - - - - 16 -   04/06/17 0812 105/64 98.2  F (36.8  C) Oral 97 101 16 98 %         Intake/Output Summary (Last 24 hours) at 04/07/17 0732  Last data filed at 04/07/17 0600   Gross per 24 hour   Intake             2523 ml   Output             2030 ml   Net              493 ml       Head - Nomocephalic atraumatic  Sclera anicteric  Extremities warm and well perfused  Lungs - breathing non labored,   Abdomen - soft, obese, wounds look good.PADMINI serous  Rectal exam - deferred  Skin - no rash  Psych - affect appropriate  Neuro - no focal deficits             Data:   All laboratory and imaging data in the past 24 hours reviewed    CBC  Lab Results   Component Value Date    WBC 9.2 04/07/2017    WBC 7.5 04/06/2017    WBC 6.3 01/02/2017    HGB 9.3 (L) 04/07/2017    HGB 10.3 (L) 04/06/2017    HGB 11.7 03/15/2017    HCT 27.8 (L)  04/07/2017    HCT 31.5 (L) 04/06/2017    HCT 36.9 01/02/2017     04/07/2017     04/06/2017     04/05/2017       BMP  Recent Labs   Lab Test  04/07/17   0705  04/06/17   0703   NA  134  138   POTASSIUM  3.5  3.8   CHLORIDE  99  104   CO2  27  27   ANIONGAP  8  7   GLC  98  102*   BUN  8  9   CR  0.70  0.73   KATARZYNA  8.4*  8.3*       Liver Function Studies -   Recent Labs   Lab Test  10/28/16   0910   PROTTOTAL  8.0   ALBUMIN  3.9   BILITOTAL  0.4   ALKPHOS  103   AST  9   ALT  17                      Medications:     Current Facility-Administered Medications Ordered in Epic   Medication Dose Route Frequency Last Rate Last Dose     ketorolac (TORADOL) injection 30 mg  30 mg Intravenous Q6H PRN   30 mg at 04/06/17 2341     potassium phosphate 10 mmol in D5W 250 mL intermittent infusion  10 mmol Intravenous Daily PRN         potassium phosphate 15 mmol in D5W 250 mL intermittent infusion  15 mmol Intravenous Daily PRN   15 mmol at 04/06/17 1124     potassium phosphate 20 mmol in D5W 500 mL intermittent infusion  20 mmol Intravenous Q6H PRN         potassium phosphate 20 mmol in D5W 250 mL intermittent infusion  20 mmol Intravenous Q6H PRN         potassium phosphate 25 mmol in D5W 500 mL intermittent infusion  25 mmol Intravenous Q8H PRN         buPROPion (WELLBUTRIN SR) 12 hr tablet 150 mg  150 mg Oral BID   150 mg at 04/06/17 1949     citalopram (celeXA) tablet 40 mg  40 mg Oral Daily   40 mg at 04/06/17 0952     fluticasone (FLONASE) 50 MCG/ACT spray 2 spray  2 spray Both Nostrils Daily PRN         levalbuterol (XOPENEX HFA) Inhaler 1-2 puff  1-2 puff Inhalation Q4H PRN         levothyroxine (SYNTHROID/LEVOTHROID) tablet 125 mcg  125 mcg Oral Daily   125 mcg at 04/06/17 0952     mometasone-formoterol (DULERA) 100-5 MCG/ACT oral inhaler 2 puff  2 puff Inhalation BID   2 puff at 04/06/17 1949     lidocaine 1 % 1 mL  1 mL Other Q1H PRN         lidocaine (LMX4) cream   Topical Q1H PRN         sodium  chloride (PF) 0.9% PF flush 3 mL  3 mL Intracatheter Q1H PRN         sodium chloride (PF) 0.9% PF flush 3 mL  3 mL Intracatheter Q8H   3 mL at 04/06/17 1429     enoxaparin (LOVENOX) injection 40 mg  40 mg Subcutaneous Q24H   40 mg at 04/06/17 0829     lactated ringers BOLUS 500 mL  500 mL Intravenous Q4H PRN         lactated ringers infusion   Intravenous Continuous 100 mL/hr at 04/07/17 0210       naloxone (NARCAN) injection 0.1-0.4 mg  0.1-0.4 mg Intravenous Q2 Min PRN         diphenhydrAMINE (BENADRYL) injection 25 mg  25 mg Intravenous Q6H PRN   25 mg at 04/07/17 0208     ondansetron (ZOFRAN) injection 4 mg  4 mg Intravenous Q6H PRN         prochlorperazine (COMPAZINE) injection 5-10 mg  5-10 mg Intravenous Q6H PRN         benzocaine-menthol (CHLORASEPTIC) 6-10 MG lozenge 1-2 lozenge  1-2 lozenge Buccal Q1H PRN         albuterol neb solution 2.5 mg  2.5 mg Nebulization Q6H PRN         [START ON 4/9/2017] levothyroxine (SYNTHROID/LEVOTHROID) half-tab 62.5 mcg  62.5 mcg Oral Once per day on Sun         No current Epic-ordered outpatient prescriptions on file.

## 2017-04-07 NOTE — PLAN OF CARE
Problem: Goal Outcome Summary  Goal: Goal Outcome Summary  Outcome: Improving  A&OX4.  Bowel sounds active.  Passing gas.  Denies nausea.  Small amount of output in ileostomy see MAR.  Full liquid diet.  IV fluids infusing.  Olvera patent.  PADMINI drain patent. PCA dilaudid for pain.  Benadryl given for itching.  Ambulated on unit X1 this shift.  2L oxygen via nasal cannula, all other VSS.  Tele sinus tach.

## 2017-04-08 LAB
CREAT SERPL-MCNC: 0.63 MG/DL (ref 0.52–1.04)
GFR SERPL CREATININE-BSD FRML MDRD: NORMAL ML/MIN/1.7M2
INTERPRETATION ECG - MUSE: NORMAL
INTERPRETATION ECG - MUSE: NORMAL
PLATELET # BLD AUTO: 226 10E9/L (ref 150–450)

## 2017-04-08 PROCEDURE — 82565 ASSAY OF CREATININE: CPT | Performed by: COLON & RECTAL SURGERY

## 2017-04-08 PROCEDURE — 25000128 H RX IP 250 OP 636: Performed by: COLON & RECTAL SURGERY

## 2017-04-08 PROCEDURE — 25000132 ZZH RX MED GY IP 250 OP 250 PS 637: Performed by: INTERNAL MEDICINE

## 2017-04-08 PROCEDURE — 99231 SBSQ HOSP IP/OBS SF/LOW 25: CPT | Performed by: INTERNAL MEDICINE

## 2017-04-08 PROCEDURE — 85049 AUTOMATED PLATELET COUNT: CPT | Performed by: COLON & RECTAL SURGERY

## 2017-04-08 PROCEDURE — 25000132 ZZH RX MED GY IP 250 OP 250 PS 637: Performed by: COLON & RECTAL SURGERY

## 2017-04-08 PROCEDURE — 12000007 ZZH R&B INTERMEDIATE

## 2017-04-08 PROCEDURE — 36415 COLL VENOUS BLD VENIPUNCTURE: CPT | Performed by: COLON & RECTAL SURGERY

## 2017-04-08 RX ADMIN — OXYCODONE HYDROCHLORIDE AND ACETAMINOPHEN 2 TABLET: 5; 325 TABLET ORAL at 07:13

## 2017-04-08 RX ADMIN — IBUPROFEN 800 MG: 400 TABLET ORAL at 17:19

## 2017-04-08 RX ADMIN — CITALOPRAM HYDROBROMIDE 40 MG: 40 TABLET ORAL at 08:32

## 2017-04-08 RX ADMIN — BUPROPION HYDROCHLORIDE 150 MG: 150 TABLET, FILM COATED, EXTENDED RELEASE ORAL at 20:08

## 2017-04-08 RX ADMIN — ENOXAPARIN SODIUM 40 MG: 40 INJECTION SUBCUTANEOUS at 08:32

## 2017-04-08 RX ADMIN — OXYCODONE HYDROCHLORIDE AND ACETAMINOPHEN 2 TABLET: 5; 325 TABLET ORAL at 00:46

## 2017-04-08 RX ADMIN — IBUPROFEN 800 MG: 400 TABLET ORAL at 04:31

## 2017-04-08 RX ADMIN — OXYCODONE HYDROCHLORIDE AND ACETAMINOPHEN 2 TABLET: 5; 325 TABLET ORAL at 20:08

## 2017-04-08 RX ADMIN — OXYCODONE HYDROCHLORIDE AND ACETAMINOPHEN 2 TABLET: 5; 325 TABLET ORAL at 13:50

## 2017-04-08 RX ADMIN — BUPROPION HYDROCHLORIDE 150 MG: 150 TABLET, FILM COATED, EXTENDED RELEASE ORAL at 08:32

## 2017-04-08 RX ADMIN — LEVOTHYROXINE SODIUM 125 MCG: 125 TABLET ORAL at 08:32

## 2017-04-08 ASSESSMENT — PAIN DESCRIPTION - DESCRIPTORS
DESCRIPTORS: HEADACHE
DESCRIPTORS: HEADACHE

## 2017-04-08 NOTE — PROGRESS NOTES
[facility]    Hospitalist Progress Note  Sam Lassiter MD    Assessment & Plan     41-year-old female with history of mild intermittent asthma, major depressive disorder, hypothyroidism and rectal adenocarcinoma, who was admitted under the Surgical Service. She underwent elective robotic-assisted lower anterior resection with diverting loop ileostomy for rectal adenocarcinoma on 4/5/17. Hospitalist Service was consulted regarding new electrocardiogram changes of non-specific T wave changes in the anterior and inferior leads. Electrocardiogram was obtained today after the patient complained of upper back pain, neck pain and feeling like she could not catch her breath.      1. S/P Sinus tachycardia: Repeat EKG done on 4/7/17 revealed, normal sinus rhythm. Serial troponin is negative.     2. POD# 3 Robotic assisted low anterior resection and diverting loop ileostomy, due to rectal cancer: Oncologist is Dr. Tony Gallegos. S/P 5-FU with radiation. For management per Colorectal Surgeon. Continue low residue diet.     3. Hypothyroidism: Continue levothyroxine 125 mcg daily and 62.5 mcg on Sundays.      4. Mild intermittent asthma:  Controlled. Continue incentive spirometry prn..   Continue Dulera 2 puffs b.i.d, Flonase 2 sprays daily p.r.n and Xopenex 1-2 puffs every 4 hours as needed.      5. Major depressive disorder: Continue Wellbutrin- mg b.i.d and Citalopram 40 mg daily.        DVT Prophylaxis: Enoxaprain (Lovenox) SQ  Code Status: Prior    Disposition: Expected discharge, per colorectal Surgeon.      Interval History   The pt reported that her headache improved after having taking a caffeinated drink, as she had not drank a caffeinated drink for the last 5 days. She usually takes diet caffeinated sodas daily at home. No complaints of chest pain/sob/palpitations/abdominal pain. She tolerated a low residue diet. She had ileostomy output of 150 mls in the last 24hrs.    -Data reviewed  today: I reviewed all new labs and imaging results over the last 24 hours. I personally reviewed no images or EKG's today.    Physical Exam   Temp: 98.4  F (36.9  C) Temp src: Oral BP: 114/63 Pulse: 93 Heart Rate: 93 Resp: 16 SpO2: 96 % O2 Device: None (Room air)    Vitals:    04/05/17 0621   Weight: 120 kg (264 lb 9 oz)     Vital Signs with Ranges  Temp:  [98  F (36.7  C)-98.6  F (37  C)] 98.4  F (36.9  C)  Pulse:  [] 93  Heart Rate:  [] 93  Resp:  [16] 16  BP: (107-150)/(61-89) 114/63  SpO2:  [94 %-96 %] 96 %  I/O last 3 completed shifts:  In: 600 [P.O.:600]  Out: 6075 [Urine:5900; Drains:25; Stool:150]    Constitutional: Adult white female, awake, cooperative, no apparent distress, O2 Sats 96% on RA  Respiratory: BS vesicular bilaterally, no crackles or wheezing  Cardiovascular: S1 and S2, reg, no murmur noted  GI: Obese+, non-tender, ileostomy in situ+, BS present+  Skin/Integumen: No rashes  Extremities: No pedal edema    Medications        sodium chloride (PF)  10 mL Intracatheter Q8H     buPROPion  150 mg Oral BID     citalopram  40 mg Oral Daily     levothyroxine (SYNTHROID/LEVOTHROID) tablet 125 mcg  125 mcg Oral Daily     mometasone-formoterol  2 puff Inhalation BID     sodium chloride (PF)  3 mL Intracatheter Q8H     enoxaparin  40 mg Subcutaneous Q24H     [START ON 4/9/2017] levothyroxine (SYNTHROID/LEVOTHROID) half-tab 62.5 mcg  62.5 mcg Oral Once per day on Sun       Data     Recent Labs  Lab 04/08/17  0720 04/07/17  0705 04/07/17  0055 04/06/17  1715 04/06/17  0703   WBC  --  9.2  --   --  7.5   HGB  --  9.3*  --   --  10.3*   MCV  --  98  --   --  100    201  --   --  257   NA  --  134  --   --  138   POTASSIUM  --  3.5  --   --  3.8   CHLORIDE  --  99  --   --  104   CO2  --  27  --   --  27   BUN  --  8  --   --  9   CR 0.63 0.70  --   --  0.73   ANIONGAP  --  8  --   --  7   KATARZYNA  --  8.4*  --   --  8.3*   GLC  --  98  --   --  102*   TROPI  --   --  <0.015The 99th percentile for  upper reference range is 0.045 ug/L.  Troponin values in the range of 0.045 - 0.120 ug/L may be associated with risks of adverse clinical events. <0.015The 99th percentile for upper reference range is 0.045 ug/L.  Troponin values in the range of 0.045 - 0.120 ug/L may be associated with risks of adverse clinical events.  --        No results found for this or any previous visit (from the past 24 hour(s)).

## 2017-04-08 NOTE — PLAN OF CARE
Problem: Goal Outcome Summary  Goal: Goal Outcome Summary  Outcome: No Change  VSS; ex tachy, low 100s. Pain controlled with PRN ibuprofen and percoset. Ileostomy bag adequate output. PADMINI drain removed this afternoon, dressing CDI. Olvera. Passing gas. Up SBA.

## 2017-04-08 NOTE — PLAN OF CARE
Problem: Goal Outcome Summary  Goal: Goal Outcome Summary  Outcome: Improving  Pt A/Ox4. Slightly tachy and hypertensive, otherwise VSS on RA. Ileostomy patent and draining. BS active. Incisions CDI. Pulses palpable. Tolerating low fiber diet. Up independently. Ambulated halls x3 this morning. PRN percocet for pain with relief. Olvera in place with adequate output. D/C pending.

## 2017-04-08 NOTE — PLAN OF CARE
Problem: Goal Outcome Summary  Goal: Goal Outcome Summary  Outcome: No Change  Pt a/o, VSS ex tachy at times with HR low 100's. Pain controlled w/ PRN Ibuprofen & Percocet. Denies nausea. Up w/ SBA. Ileostomy & juarez patent. Hypo BS, +flatus via ostomy. Will continue to monitor.

## 2017-04-09 PROCEDURE — 12000007 ZZH R&B INTERMEDIATE

## 2017-04-09 PROCEDURE — 99231 SBSQ HOSP IP/OBS SF/LOW 25: CPT | Performed by: INTERNAL MEDICINE

## 2017-04-09 PROCEDURE — 25000128 H RX IP 250 OP 636: Performed by: COLON & RECTAL SURGERY

## 2017-04-09 PROCEDURE — 25000132 ZZH RX MED GY IP 250 OP 250 PS 637: Performed by: INTERNAL MEDICINE

## 2017-04-09 PROCEDURE — 25000132 ZZH RX MED GY IP 250 OP 250 PS 637: Performed by: COLON & RECTAL SURGERY

## 2017-04-09 RX ADMIN — CITALOPRAM HYDROBROMIDE 40 MG: 40 TABLET ORAL at 07:51

## 2017-04-09 RX ADMIN — OXYCODONE HYDROCHLORIDE AND ACETAMINOPHEN 2 TABLET: 5; 325 TABLET ORAL at 18:45

## 2017-04-09 RX ADMIN — OXYCODONE HYDROCHLORIDE AND ACETAMINOPHEN 2 TABLET: 5; 325 TABLET ORAL at 10:19

## 2017-04-09 RX ADMIN — LEVOTHYROXINE SODIUM 125 MCG: 125 TABLET ORAL at 07:52

## 2017-04-09 RX ADMIN — ENOXAPARIN SODIUM 40 MG: 40 INJECTION SUBCUTANEOUS at 07:51

## 2017-04-09 RX ADMIN — LEVOTHYROXINE SODIUM 62.5 MCG: 125 TABLET ORAL at 07:51

## 2017-04-09 RX ADMIN — BUPROPION HYDROCHLORIDE 150 MG: 150 TABLET, FILM COATED, EXTENDED RELEASE ORAL at 07:51

## 2017-04-09 RX ADMIN — IBUPROFEN 800 MG: 400 TABLET ORAL at 10:19

## 2017-04-09 RX ADMIN — OXYCODONE HYDROCHLORIDE AND ACETAMINOPHEN 2 TABLET: 5; 325 TABLET ORAL at 04:29

## 2017-04-09 RX ADMIN — BUPROPION HYDROCHLORIDE 150 MG: 150 TABLET, FILM COATED, EXTENDED RELEASE ORAL at 20:45

## 2017-04-09 NOTE — PLAN OF CARE
Problem: Goal Outcome Summary  Goal: Goal Outcome Summary  Outcome: No Change  VSS; ex at times slightly tachy. Pain controlled with PRN percoset. Ileostomy patent and draining. Passing flatus. Tolerating diet. Up ambulating independently. Olvera with adequate UOP. Discharge pending per [facility] consult Monday.

## 2017-04-09 NOTE — PLAN OF CARE
Problem: Goal Outcome Summary  Goal: Goal Outcome Summary  Outcome: Improving  Pt A/Ox4. VSS on RA. Dressings CDI. IS to 1750. Up independently. PRN percocet and ibuprofen for pain with relief. Ambulated halls x3. Tolerating regular diet with a good appetite. Olvera pulled, voiding adequately per BR. Plan to D/C tomorrow.

## 2017-04-09 NOTE — PLAN OF CARE
Problem: Goal Outcome Summary  Goal: Goal Outcome Summary  Outcome: Improving  VSS, percocet for pain. Ostomy small amount of stool. Olvera patent,  no IV access. judith low fiber diet, independently in room.

## 2017-04-09 NOTE — PROGRESS NOTES
Colon and Rectal Surgery Progress Note          Assessment and Plan:   POD #4 robotic LAR and DLI, T2 N0    D/c juarez  Continue lovenox,  Plan to d/c tomorrow    Yair Adkins MD  Colorectal Surgery  [phone] (office)  [phone] (pager)  [email or web address]             Interval History:   Doing great.  Tolerating diet.  Stoma output appropriate              Physical Exam:   Vitals were reviewed  Patient Vitals for the past 24 hrs:   BP Temp Temp src Pulse Heart Rate Resp SpO2   04/09/17 0723 115/65 98.5  F (36.9  C) Oral - 83 18 94 %   04/09/17 0013 114/65 98.1  F (36.7  C) Oral - 91 18 96 %   04/08/17 2053 - - - - - 16 -   04/08/17 2008 - - - - - 16 -   04/08/17 1936 138/81 98  F (36.7  C) Oral - 91 16 99 %   04/08/17 1719 - - - - - 16 -   04/08/17 1553 121/72 98.2  F (36.8  C) Oral 72 99 16 94 %   04/08/17 1130 114/63 98.4  F (36.9  C) Oral 93 93 16 96 %         Intake/Output Summary (Last 24 hours) at 04/09/17 1011  Last data filed at 04/09/17 0600   Gross per 24 hour   Intake              760 ml   Output             2825 ml   Net            -2065 ml       Head - Nomocephalic atraumatic  Sclera anicteric  Extremities warm and well perfused  Lungs - breathing non labored,   Abdomen - soft, wound look good  Rectal exam - deferred  Skin - no rash  Psych - affect appropriate  Neuro - no focal deficits             Data:   All laboratory and imaging data in the past 24 hours reviewed    CBC  Lab Results   Component Value Date    WBC 9.2 04/07/2017    WBC 7.5 04/06/2017    WBC 6.3 01/02/2017    HGB 9.3 (L) 04/07/2017    HGB 10.3 (L) 04/06/2017    HGB 11.7 03/15/2017    HCT 27.8 (L) 04/07/2017    HCT 31.5 (L) 04/06/2017    HCT 36.9 01/02/2017     04/08/2017     04/07/2017     04/06/2017       BMP  Recent Labs   Lab Test  04/08/17   0720  04/07/17   0705  04/06/17   0703   NA   --   134  138   POTASSIUM   --   3.5  3.8   CHLORIDE   --   99  104   CO2   --   27  27   ANIONGAP   --   8  7   GLC   --    98  102*   BUN   --   8  9   CR  0.63  0.70  0.73   KATARZYNA   --   8.4*  8.3*       Liver Function Studies -   Recent Labs   Lab Test  10/28/16   0910   PROTTOTAL  8.0   ALBUMIN  3.9   BILITOTAL  0.4   ALKPHOS  103   AST  9   ALT  17                      Medications:     Current Facility-Administered Medications Ordered in Epic   Medication Dose Route Frequency Last Rate Last Dose     metoprolol (LOPRESSOR) injection 2.5 mg  2.5 mg Intravenous Q4H PRN         oxyCODONE-acetaminophen (PERCOCET) 5-325 MG per tablet 1-2 tablet  1-2 tablet Oral Q6H PRN   2 tablet at 04/09/17 0429     HYDROmorphone (PF) (DILAUDID) injection 0.3-0.5 mg  0.3-0.5 mg Intravenous Q2H PRN         acetaminophen (TYLENOL) tablet 650 mg  650 mg Oral Q4H PRN   650 mg at 04/07/17 1401     ibuprofen (ADVIL/MOTRIN) tablet 800 mg  800 mg Oral TID PRN   800 mg at 04/08/17 1719     ketorolac (TORADOL) injection 30 mg  30 mg Intravenous Q6H PRN   30 mg at 04/07/17 1354     potassium phosphate 10 mmol in D5W 250 mL intermittent infusion  10 mmol Intravenous Daily PRN         potassium phosphate 15 mmol in D5W 250 mL intermittent infusion  15 mmol Intravenous Daily PRN   15 mmol at 04/06/17 1124     potassium phosphate 20 mmol in D5W 500 mL intermittent infusion  20 mmol Intravenous Q6H PRN         potassium phosphate 20 mmol in D5W 250 mL intermittent infusion  20 mmol Intravenous Q6H PRN         potassium phosphate 25 mmol in D5W 500 mL intermittent infusion  25 mmol Intravenous Q8H PRN         buPROPion (WELLBUTRIN SR) 12 hr tablet 150 mg  150 mg Oral BID   150 mg at 04/09/17 0751     citalopram (celeXA) tablet 40 mg  40 mg Oral Daily   40 mg at 04/09/17 0751     fluticasone (FLONASE) 50 MCG/ACT spray 2 spray  2 spray Both Nostrils Daily PRN         levalbuterol (XOPENEX HFA) Inhaler 1-2 puff  1-2 puff Inhalation Q4H PRN         levothyroxine (SYNTHROID/LEVOTHROID) tablet 125 mcg  125 mcg Oral Daily   125 mcg at 04/09/17 0752     mometasone-formoterol  (DULERA) 100-5 MCG/ACT oral inhaler 2 puff  2 puff Inhalation BID   2 puff at 04/09/17 0754     lidocaine 1 % 1 mL  1 mL Other Q1H PRN         lidocaine (LMX4) cream   Topical Q1H PRN         sodium chloride (PF) 0.9% PF flush 3 mL  3 mL Intracatheter Q1H PRN         enoxaparin (LOVENOX) injection 40 mg  40 mg Subcutaneous Q24H   40 mg at 04/09/17 0751     lactated ringers BOLUS 500 mL  500 mL Intravenous Q4H PRN         naloxone (NARCAN) injection 0.1-0.4 mg  0.1-0.4 mg Intravenous Q2 Min PRN         diphenhydrAMINE (BENADRYL) injection 25 mg  25 mg Intravenous Q6H PRN   25 mg at 04/07/17 0208     ondansetron (ZOFRAN) injection 4 mg  4 mg Intravenous Q6H PRN         prochlorperazine (COMPAZINE) injection 5-10 mg  5-10 mg Intravenous Q6H PRN         benzocaine-menthol (CHLORASEPTIC) 6-10 MG lozenge 1-2 lozenge  1-2 lozenge Buccal Q1H PRN         albuterol neb solution 2.5 mg  2.5 mg Nebulization Q6H PRN         levothyroxine (SYNTHROID/LEVOTHROID) half-tab 62.5 mcg  62.5 mcg Oral Once per day on Sun   62.5 mcg at 04/09/17 0751     No current [facility]-ordered outpatient prescriptions on file.

## 2017-04-09 NOTE — PROGRESS NOTES
[facility]    Hospitalist Progress Note  Sam Lassiter MD    Assessment & Plan     41-year-old female with history of mild intermittent asthma, major depressive disorder, hypothyroidism and rectal adenocarcinoma, who was admitted under the Surgical Service. She underwent elective robotic-assisted lower anterior resection with diverting loop ileostomy for rectal adenocarcinoma on 4/5/17. Hospitalist Service was consulted regarding new electrocardiogram changes of non-specific T wave changes in the anterior and inferior leads. Electrocardiogram was obtained today after the patient complained of upper back pain, neck pain and feeling like she could not catch her breath.      1. S/P Sinus tachycardia: Repeat EKG done on 4/7/17 revealed, normal sinus rhythm. Serial troponin is negative.     2. POD# 3 Robotic assisted low anterior resection and diverting loop ileostomy, due to rectal cancer: Oncologist is Dr. Tony Gallegos. S/P 5-FU with radiation. For management per Colorectal Surgeon. Continue low residue diet.     3. Hypothyroidism: Continue levothyroxine 125 mcg daily and 62.5 mcg on Sundays.      4. Mild intermittent asthma:  Controlled. Continue incentive spirometry prn..   Continue Dulera 2 puffs b.i.d, Flonase 2 sprays daily p.r.n and Xopenex 1-2 puffs every 4 hours as needed.      5. Major depressive disorder: Continue Wellbutrin- mg b.i.d and Citalopram 40 mg daily.        DVT Prophylaxis: Enoxaprain (Lovenox) SQ  Code Status: Prior    Disposition: Expected discharge, per colorectal Surgeon.      Interval History   The pt reported feeling fine. Her juarez catheter was removed today and she is voiding with no problems. She is ambulating with no problems.    -Data reviewed today: I reviewed all new labs and imaging results over the last 24 hours. I personally reviewed no images or EKG's today.    Physical Exam   Temp: 97.8  F (36.6  C) Temp src: Oral BP: 114/66 Pulse: 85 Heart  Rate: 84 Resp: 18 SpO2: 97 % O2 Device: None (Room air)    Vitals:    04/05/17 0621   Weight: 120 kg (264 lb 9 oz)     Vital Signs with Ranges  Temp:  [97.8  F (36.6  C)-98.5  F (36.9  C)] 97.8  F (36.6  C)  Pulse:  [85] 85  Heart Rate:  [83-93] 84  Resp:  [16-18] 18  BP: (114-138)/(62-81) 114/66  SpO2:  [94 %-99 %] 97 %  I/O last 3 completed shifts:  In: 1000 [P.O.:1000]  Out: 2450 [Urine:1850; Stool:600]    Constitutional: Adult white female, awake, cooperative, no apparent distress, O2 Sats 97% on RA  Respiratory: BS vesicular bilaterally, no crackles or wheezing  Cardiovascular: S1 and S2, reg, no murmur noted  GI: Obese+, non-tender, ileostomy in situ+, BS present+  Skin/Integumen: No rashes  Extremities: No pedal edema    Medications        buPROPion  150 mg Oral BID     citalopram  40 mg Oral Daily     levothyroxine (SYNTHROID/LEVOTHROID) tablet 125 mcg  125 mcg Oral Daily     mometasone-formoterol  2 puff Inhalation BID     enoxaparin  40 mg Subcutaneous Q24H     levothyroxine (SYNTHROID/LEVOTHROID) half-tab 62.5 mcg  62.5 mcg Oral Once per day on Sun       Data     Recent Labs  Lab 04/08/17  0720 04/07/17  0705 04/07/17  0055 04/06/17  1715 04/06/17  0703   WBC  --  9.2  --   --  7.5   HGB  --  9.3*  --   --  10.3*   MCV  --  98  --   --  100    201  --   --  257   NA  --  134  --   --  138   POTASSIUM  --  3.5  --   --  3.8   CHLORIDE  --  99  --   --  104   CO2  --  27  --   --  27   BUN  --  8  --   --  9   CR 0.63 0.70  --   --  0.73   ANIONGAP  --  8  --   --  7   KATARZYNA  --  8.4*  --   --  8.3*   GLC  --  98  --   --  102*   TROPI  --   --  <0.015The 99th percentile for upper reference range is 0.045 ug/L.  Troponin values in the range of 0.045 - 0.120 ug/L may be associated with risks of adverse clinical events. <0.015The 99th percentile for upper reference range is 0.045 ug/L.  Troponin values in the range of 0.045 - 0.120 ug/L may be associated with risks of adverse clinical events.  --         No results found for this or any previous visit (from the past 24 hour(s)).

## 2017-04-10 VITALS
HEIGHT: 65 IN | DIASTOLIC BLOOD PRESSURE: 78 MMHG | OXYGEN SATURATION: 96 % | SYSTOLIC BLOOD PRESSURE: 125 MMHG | BODY MASS INDEX: 44.08 KG/M2 | HEART RATE: 92 BPM | RESPIRATION RATE: 16 BRPM | TEMPERATURE: 98.3 F | WEIGHT: 264.56 LBS

## 2017-04-10 PROCEDURE — 25000128 H RX IP 250 OP 636: Performed by: COLON & RECTAL SURGERY

## 2017-04-10 PROCEDURE — 99231 SBSQ HOSP IP/OBS SF/LOW 25: CPT | Performed by: INTERNAL MEDICINE

## 2017-04-10 PROCEDURE — 25000132 ZZH RX MED GY IP 250 OP 250 PS 637: Performed by: COLON & RECTAL SURGERY

## 2017-04-10 RX ORDER — OXYCODONE AND ACETAMINOPHEN 5; 325 MG/1; MG/1
1-2 TABLET ORAL EVERY 6 HOURS PRN
Qty: 40 TABLET | Refills: 0 | Status: SHIPPED | OUTPATIENT
Start: 2017-04-10 | End: 2017-08-21

## 2017-04-10 RX ADMIN — OXYCODONE HYDROCHLORIDE AND ACETAMINOPHEN 2 TABLET: 5; 325 TABLET ORAL at 01:06

## 2017-04-10 RX ADMIN — CITALOPRAM HYDROBROMIDE 40 MG: 40 TABLET ORAL at 08:25

## 2017-04-10 RX ADMIN — OXYCODONE HYDROCHLORIDE AND ACETAMINOPHEN 2 TABLET: 5; 325 TABLET ORAL at 09:30

## 2017-04-10 RX ADMIN — LEVOTHYROXINE SODIUM 125 MCG: 125 TABLET ORAL at 08:25

## 2017-04-10 RX ADMIN — ENOXAPARIN SODIUM 40 MG: 40 INJECTION SUBCUTANEOUS at 08:25

## 2017-04-10 RX ADMIN — BUPROPION HYDROCHLORIDE 150 MG: 150 TABLET, FILM COATED, EXTENDED RELEASE ORAL at 08:25

## 2017-04-10 NOTE — DISCHARGE SUMMARY
[facility] Discharge Summary      Yahaira Ramirez MRN# [id number]   Age: 42 year old YOB: 1975     Date of Admission:  4/5/2017  Date of Discharge::  4/10/2017  Admitting Physician:  Yair dAkins MD  Discharge Physician:  Yair Adkins MD     PCP:  Guille Torre    Disposition: Patient discharged from [facility] to home in stable condition.        Primary Diagnosis:   Rectal cancer, abnormal EKG            Discharge Medications:   Current Discharge Medication List      START taking these medications    Details   oxyCODONE-acetaminophen (PERCOCET) 5-325 MG per tablet Take 1-2 tablets by mouth every 6 hours as needed for moderate to severe pain  Qty: 40 tablet, Refills: 0    Associated Diagnoses: Rectal cancer (H)      enoxaparin (LOVENOX) 40 MG/0.4ML injection Inject 0.4 mLs (40 mg) Subcutaneous every 24 hours  Qty: 25 Syringe, Refills: 0    Associated Diagnoses: Rectal cancer (H)         CONTINUE these medications which have NOT CHANGED    Details   mometasone-formoterol (DULERA) 100-5 MCG/ACT oral inhaler Inhale 2 puffs into the lungs 2 times daily      fluticasone (FLONASE) 50 MCG/ACT spray Spray 2 sprays into both nostrils daily as needed for rhinitis or allergies (Congestion)      !! Levothyroxine Sodium (SYNTHROID PO) Take 125 mcg by mouth daily      !! Levothyroxine Sodium (SYNTHROID PO) Take 62.5 mcg by mouth once a week (Sundays) 0.5 x 125 mcg = 62.5 mcg (Patient takes this with her regular daily dose of 125 mcg = 187.5 mcg on Sundays)      IBUPROFEN PO Take 800 mg by mouth daily as needed for moderate pain (Headaches)      Acetaminophen (TYLENOL PO) Take 1,000 mg by mouth daily as needed for mild pain (Headaches)      DiphenhydrAMINE HCl (BENADRYL PO) Take 50 mg by mouth At Bedtime (2 x 25 mg tablet = 50 mg dose)      citalopram (CELEXA) 40 MG tablet Take 1 tablet (40 mg) by mouth daily  Qty: 30 tablet, Refills: 0    Associated Diagnoses: Major depressive  disorder, recurrent episode, mild (H)      buPROPion (WELLBUTRIN SR) 150 MG 12 hr tablet Take 1 tablet (150 mg) by mouth 2 times daily  Qty: 180 tablet, Refills: 3    Associated Diagnoses: Major depressive disorder, recurrent episode, mild (H)      levalbuterol (XOPENEX HFA) 45 MCG/ACT inhaler Inhale 1-2 puffs into the lungs every 4 hours as needed for shortness of breath / dyspnea.  Qty: 1 Inhaler, Refills: 3       !! - Potential duplicate medications found. Please discuss with provider.                 Follow Up, Special Instructions:   Discharge diet: Low residue   Discharge activity: No lifting, driving, or strenuous exercise for 6 week(s)   Discharge follow-up: Follow up with Dr. Adkins in 3-4 weeks   Wound care: Keep wound clean and dry              Procedures:   Procedure(s): Robotic assisted low anterior resection with laparoscopic assisted diverting loop ileostomy               Consultations:   Hospitalist, [facility]          Brief Hospital Summary:   Patient is a 42 year old woman.  she underwent robotic LAR with lap DLI on 4/5/17 by Dr Adkins.   There were no immediate complications during this procedure.    Please refer to the full operative summary for details.  The patient's hospital course was unremarkable. She did have persistent tachycardia and EKG changes on POD#1. Serial troponins were normals and a repeat EKG showed sinus rhythym. Her diet was advanced as she had full return of bowel function through the ileostomy. Her pain was controlled and she was able to void well after her juarez catheter was removed. She recovered as anticipated and experienced no post-operative complications.         Attestation:  I have reviewed today's vital signs, notes, medications, labs and imaging.    Anu Stauffer PA-C  [facility]          ADDENDUM:  Length of stay: 5 days  Indicate Y or N for the following:  UTI  No  C diff  No  PNA  No  SSI No  DVT No  PE  No  CVA No  MI No  Enterocutaneous  fistula  No  Peripheral nerve injury  No  Abscess (not adjacent to anastomosis)  No  Leak No    Treated with:   Antibiotics N/A   Drain  N/A   Reoperation  N/A  Death within 30 days No  Reintubation  No  Reoperation  No   Procedure NA    FOR CANCER CASES:  T stage: 2  N stage: 0   Total number of nodes: 19   Total positive: 0  M stage:   R:   TME grade, if known (1,2,3):   MSI (pos, neg): neg

## 2017-04-10 NOTE — PROGRESS NOTES
[facility]    Hospitalist Progress Note  Sam Lassiter MD    Assessment & Plan     41-year-old female with history of mild intermittent asthma, major depressive disorder, hypothyroidism and rectal adenocarcinoma, who was admitted under the Surgical Service. She underwent elective robotic-assisted lower anterior resection with diverting loop ileostomy for rectal adenocarcinoma on 4/5/17. Hospitalist Service was consulted regarding new electrocardiogram changes of non-specific T wave changes in the anterior and inferior leads. Electrocardiogram was obtained today after the patient complained of upper back pain, neck pain and feeling like she could not catch her breath.      1. S/P Sinus tachycardia: Repeat EKG done on 4/7/17 revealed, normal sinus rhythm. Serial troponin is negative.     2. POD# 4 Robotic assisted low anterior resection and diverting loop ileostomy, due to rectal cancer: Oncologist is Dr. Tony Gallegos. S/P 5-FU with radiation. For management per Colorectal Surgeon. Continue low residue diet.     3. Hypothyroidism: Continue levothyroxine 125 mcg daily and 62.5 mcg on Sundays.      4. Mild intermittent asthma:  Controlled. Continue incentive spirometry prn..   Continue Dulera 2 puffs b.i.d, Flonase 2 sprays daily p.r.n and Xopenex 1-2 puffs every 4 hours as needed.      5. Major depressive disorder: Continue Wellbutrin- mg b.i.d and Citalopram 40 mg daily.        DVT Prophylaxis: Enoxaprain (Lovenox) SQ  Code Status: Prior    Disposition: Expected discharge today, per colorectal Surgeon.      Interval History   The pt reported feeling fine and had no complaints. Her vital signs are stable.    -Data reviewed today: I reviewed all new labs and imaging results over the last 24 hours. I personally reviewed no images or EKG's today.    Physical Exam   Temp: 98.3  F (36.8  C) Temp src: Oral BP: 125/78 Pulse: 92 Heart Rate: 91 Resp: 16 SpO2: 96 % O2 Device: None (Room air)     Vitals:    04/05/17 0621   Weight: 120 kg (264 lb 9 oz)     Vital Signs with Ranges  Temp:  [97.8  F (36.6  C)-98.3  F (36.8  C)] 98.3  F (36.8  C)  Pulse:  [85-92] 92  Heart Rate:  [84-93] 91  Resp:  [16-18] 16  BP: (112-134)/(59-84) 125/78  SpO2:  [95 %-98 %] 96 %  I/O last 3 completed shifts:  In: 1100 [P.O.:1100]  Out: 800 [Urine:200; Stool:600]    Constitutional: Adult white female, awake, cooperative, no apparent distress, O2 Sats 96% on RA  Respiratory: BS vesicular bilaterally, no crackles or wheezing  Cardiovascular: S1 and S2, reg, no murmur noted  GI: Obese+, non-tender, ileostomy in situ+, BS present+  Skin/Integumen: No rashes  Extremities: No pedal edema    Medications        buPROPion  150 mg Oral BID     citalopram  40 mg Oral Daily     levothyroxine (SYNTHROID/LEVOTHROID) tablet 125 mcg  125 mcg Oral Daily     mometasone-formoterol  2 puff Inhalation BID     enoxaparin  40 mg Subcutaneous Q24H     levothyroxine (SYNTHROID/LEVOTHROID) half-tab 62.5 mcg  62.5 mcg Oral Once per day on Sun       Data     Recent Labs  Lab 04/08/17  0720 04/07/17  0705 04/07/17  0055 04/06/17  1715 04/06/17  0703   WBC  --  9.2  --   --  7.5   HGB  --  9.3*  --   --  10.3*   MCV  --  98  --   --  100    201  --   --  257   NA  --  134  --   --  138   POTASSIUM  --  3.5  --   --  3.8   CHLORIDE  --  99  --   --  104   CO2  --  27  --   --  27   BUN  --  8  --   --  9   CR 0.63 0.70  --   --  0.73   ANIONGAP  --  8  --   --  7   KATARZYNA  --  8.4*  --   --  8.3*   GLC  --  98  --   --  102*   TROPI  --   --  <0.015The 99th percentile for upper reference range is 0.045 ug/L.  Troponin values in the range of 0.045 - 0.120 ug/L may be associated with risks of adverse clinical events. <0.015The 99th percentile for upper reference range is 0.045 ug/L.  Troponin values in the range of 0.045 - 0.120 ug/L may be associated with risks of adverse clinical events.  --        No results found for this or any previous visit (from  the past 24 hour(s)).

## 2017-04-10 NOTE — PROGRESS NOTES
COLON [facility] RECT[facility]GERY  PROGRESS NOTE    April 10, 2017  Post-op Day # 5    SUBJECTIVE:  The patient is doing well. Olvera removed and able to void. Tolerating low fiber diet without nausea or vomiting. Just noticing she has a small appetite. Pain controlled. Wondering about foods that decrease gas from ileostomy    OBJECTIVE:  Temp:  [97.8  F (36.6  C)-98.3  F (36.8  C)] 98.3  F (36.8  C)  Pulse:  [85-92] 92  Heart Rate:  [84-93] 91  Resp:  [16-18] 16  BP: (112-134)/(59-84) 125/78  SpO2:  [95 %-98 %] 96 %    Intake/Output Summary (Last 24 hours) at 04/10/17 0826  Last data filed at 04/10/17 0600   Gross per 24 hour   Intake              860 ml   Output              800 ml   Net               60 ml       GENERAL:  Awake, alert, no acute distress,   HEAD - Nomocephalic atraumatic  SCLERA anicteric  EXTREMITIES warm and well perfused  ABDOMEN:  Soft, appropriately tender, non-distended, ileostomy is pink and viable. Gas and thick liquid stool in bag.   INCISION:  C/d/i,     LABS:  Lab Results   Component Value Date    WBC 9.2 04/07/2017     Lab Results   Component Value Date    HGB 9.3 04/07/2017     Lab Results   Component Value Date    HCT 27.8 04/07/2017     Lab Results   Component Value Date     04/08/2017     Last Basic Metabolic Panel:  Lab Results   Component Value Date     04/07/2017      Lab Results   Component Value Date    POTASSIUM 3.5 04/07/2017     Lab Results   Component Value Date    CHLORIDE 99 04/07/2017     Lab Results   Component Value Date    KATARZYNA 8.4 04/07/2017     Lab Results   Component Value Date    CO2 27 04/07/2017     Lab Results   Component Value Date    BUN 8 04/07/2017     Lab Results   Component Value Date    CR 0.63 04/08/2017     Lab Results   Component Value Date    GLC 98 04/07/2017       ASSESSMENT/PLAN:POD#5 robotic LAR with DLI  1. Low fiber diet  2. PO PRN pain meds  3. Lovenox for prophylaxis and teaching for d/c  4. OOB, ambulate  5. WOC for education  6: Dispo:  Plan to d/c home today after seen by WOC. Will need 30 days lovenox and [facility]    Anu Stauffer PA-C  [facility]  Phone: [phone]

## 2017-04-10 NOTE — DISCHARGE INSTRUCTIONS
Discharge Instructions following Abdominal Surgery  [facility]      Bowel Function  After removal of a portion of the intestines, it is normal for bowel movements to be erratic.  They are often looser and more frequent.  This condition generally resolves within a few weeks or it can be controlled with diet or medication.  Diet  In general, you may return to a well-balanced diet upon discharge.  Your doctor will let you know when to add extra fiber to your diet.  Be sure to drink plenty of fluids.  Incision  You should expect some discomfort in the area of your incision, particularly as you increase your activity.  If you notice an area of increasing redness or new drainage, please call your doctor.  You may shower as desired but refrain from tub baths or swimming until the incisions are fully healed.  Activity  Gradually increase your activity each day.  There are generally no restrictions on walking, climbing stairs, or riding in a car.  You can usually drive a car 7-10 days after your leave the hospital.  Do not drive while taking narcotic pain medications.  Ask your doctor regarding resumption of physical sexual activity; in most cases, you can resume sex after a few weeks.  Your physician will advise you about when to return to work.  It is preferable to have somebody stay with you at home until you are fully healed and able to resume a normal level of activity   Medications  You will be discharged with a prescription pain medication and any medications you were taking prior to surgery.  Do not drive while taking narcotic pain medications.  If you need additional medications or a refill, you must call your doctor during normal business hours.  It is the policy of [facility] to not refill pain medication after hours or on weekends because your chart is not available.  Follow-up  Typically, you will be asked to schedule a follow-up office visit 1  to 4 weeks after surgery.  If you have any questions related to follow-up, medications, or your condition, please call the office.      Call your surgeon if you have these signs or symptoms:  ([phone])    Problem with the incision, including increasing pain, swelling, redness, or drainage    Increasing abdominal pain    Uncontrolled nausea or vomiting    Fever or chills    Constipation  (no bowel movement for 3 days)    Diarrhea (more than 3 watery stools within 24 hours)    Bleeding from the rectum, wound, or stoma    Any questions or concerns

## 2017-04-10 NOTE — PLAN OF CARE
Problem: Goal Outcome Summary  Goal: Goal Outcome Summary  Outcome: Improving  VSS. Pain controlled with Percoset. Ileostomy with adequate output and gas. Up independently, ambulating halls. Tolerating regular diet. Voiding. Plan to discharge tomorrow.

## 2017-04-10 NOTE — PLAN OF CARE
Problem: Goal Outcome Summary  Goal: Goal Outcome Summary  Outcome: Improving  Pt progressing per POC.  DC instructions reviewed.  Questions answered.  Scripts filled and sent home with pt.  Pt able to demonstrate how to give self lovenox shots.

## 2017-04-10 NOTE — PROGRESS NOTES
Care Transition Initial Assessment - RN        Met with: Patient.    DATA   Active Problems:    Rectal cancer (H)       Cognitive Status: awake, alert and oriented.        Contact information and PCP information verified: Yes    Lives With: parent(s)     Insurance concerns: No Insurance issues identified    ASSESSMENT  Patient currently receives the following services:  none        Identified issues/concerns regarding health management: none  Patient lives independently. She states her mother will be with her at discharge to assist as needed.     PLAN  Financial costs for the patient include per her insurance coveage.   Patient given options and choices for discharge yes  Patient/family is agreeable to the plan?  Yes:   Patient anticipates discharging to home with [facility] through [place].        Patient anticipates needs for home equipment: Yes, ostomy supplies    Plan/Disposition: Home   Appointments:         Care  (CTS) will continue to follow as needed.

## 2017-04-10 NOTE — PROGRESS NOTES
"[facility] Nurse Inpatient Ostomy Assessment      Initial Assessment of ostomy and needs for:  Diverting loop ileostomy      Data:   History:   Per MD note(s):     robotic assisted LAR with DLI for cT3N1 rectal adenocarcinoma    Type of ostomy: New Diverting Loop ileostomy    Stoma assessment:   ? Stoma 1 3/8\", rounded, red,moist, protrudes, central functional lumen  ? A bridge in place?: No  ? Stent(s) in place?: No,   Mucocutaneous Junction (MCJ): intact with sutures  Peristomal skin:  Intact, no erythema  Abdominal assessment:  Rounded, distended and frim. Stoma sits in small bowl  ? N/G still in place?: No  Output: 200cc sims/green thick bile looking output. Small amt flatus noted         I/O last 3 completed shifts:  In: 500 [P.O.:500]  Out: 300 [Stool:300]    Current pouching system:   ScheduleSoft New image convex cut to fit with ring and high output pouch. No leakage under convex    ? Pain: tenderness, aching. Pt is anxious to go home  ?  Is pt still on a PCA? No, on percocet    Diet:       Active Diet Order    Low residue    Labs:   Recent Labs   Lab Test  04/07/17   0705   03/15/17   1624   10/28/16   0910   ALBUMIN   --    --    --    --   3.9   HGB  9.3*   < >  11.7   < >  13.1   INR   --    --   0.90   < >   --    WBC  9.2   < >   --    < >  8.2   A1C   --    --   5.1   --    --     < > = values in this interval not displayed.             Intervention:     Patient's chart evaluated.      Assessments done today:  Stoma, pouching system and readiness to learn    Education:  Reviewed surgery and general ileostomy management. Pt assisted with appliance change today. Pt is emptying high output independently    Prepared for discharge by: AVS completed and reviewed. Pt has supplies for discharge    Pt registered for ostomy supply samples?  Yes Jareth Secure start         Assessment:     Stoma assessment: stoma viable and slowly resuming function    Learning needs/ comprehension: " "ileostomy management and appliance change    Effectiveness of current pouching/ supply plan: continue convexity         Plan:     Plan:   ? Current pouching system: Jareth New image 1 3/8\" convex with ring and high ouptput pouch    Education:  ? Education completed:  Surgery reviewed, diet, odor, dehydration, food blockage and supplies  ? Educational needs: Ileostomy management and reinforcement appliance change  ? Continue to prepare for discharge:  AVS completed and reviewed.                      Supplies @ bedside for discharge                      AVS written instruction and booklet reviewed  o Supply company TBD  o Do [facility] Nurses recommend home care? yes     Face to face time: 30 minutes  EDU 25 minutes  "

## 2017-04-10 NOTE — PROGRESS NOTES
"New Diverting loop Ileostomy      1. Change appliance 2x/wk and as needed for any leakage   2. Empty pouch when 1/3 full. Initially wake to check need for pouch emtpying at least once during the night    3. Carry an extra pouching system with you at all times  4. Diet: Eat 6 small meals/day. Drink 8oz/fluid every hour during waking hours. Chew all food well.    5. Diet: Eat pasta, rice, potatoes, hot/dry cereal, toast, breads, pretzels, chips, crackers; yogurt, meat, fish, eggs, cheese, peanut butter, bananas to thicken the stool. Eat 3-4 servings of protien/day.  6. Avoid eating: Seeds, nuts, peels, raw vegetables, chinese vegetables, casings on meats, grape/prume juice, grapefruit, oranges, pineapple, mushrooms.   7. NO laxatives. NO timed released medications   8. May shower with appliance on. Blow dry (on cool setting) cloth backing and tape following bathing.   9. Change your appliance in the morning before breakfast.  10. Walk, no heavy lifting  11. Use an odor eliminator (Ex: Febreeze) in the room prior to emptying or changing appliance  12. Initially monitor your output to avoid dehydration.   Call [facility]  if your output exceeds 1500cc/24hrs.    Supplies: Jareth New Image 2-pc  1. Barrier New Image: Flextend  Convex  cut to fit with tape            #28762 5/bx = 2-4 bx/month OR    2. Pouch New Image High output                                                  #81717 10/bx = 1-2 bx/month OR  3. Pouch: drainable lock n roll opaque                                           #[id number]  10/bx = 1-2bx/month  4 : Jareth Adapt ring                                                                #7805 10/bx = 1bx/month    Procedure for Ileostomy Pouch Change  1. Draw and cut opening in barrier/pouch following pattern. Cut outside  the 1 1/2\" line   2. Remove plastic backing   3.  Open ring and stretch to approx size of opening in barrier. Apply around opening on sticky side of barrier. Press into place.   4. Fold " "back edges of paper that covers the tape to create a \"tab\". This assists with paper removal in Step #9. OK to snap pouch of choice on barrier at this step and apply as a 1-pc pouch  5. Set barrier aside.   6. Remove pouch from around stoma. Discard.   7. Cleanse skin around stoma with water. Pat dry.  8. Center stoma in barrier opening. Press barrier to skin.  9.Pull on \"tab\" to remove paper that covers the tape. Press tape to skin   10. Snap on pouch of choice  11. Close spigot on high output or close lock n roll closure    Follow-up  Suyapa BUTTERFIELDOCN's (ostomy nurse)    Call for any ?/concerns and for appt in 2-3 weeks  Office: [phone]                             "

## 2017-04-10 NOTE — PLAN OF CARE
Problem: Goal Outcome Summary  Goal: Goal Outcome Summary  Outcome: Improving  VSS, A&O. Abd incisions RAUL c liquid bandage. Flatus+ via ostomy, appliance intact, brown soft o/p. Concerned about noise volume that flatus makes from the pouch, WOC nurse consult sched in AM. Voiding adequately per patient. Up independent, steady on feet. Percocet for pain, judith well. Slept throughout night, a little restless. Plan to d/c today.

## 2017-04-10 NOTE — PLAN OF CARE
Problem: Goal Outcome Summary  Goal: Goal Outcome Summary  Outcome: Improving  VSS, A&O. Abd incisions RAUL c liquid bandage. Flatus+ via ostomy, appliance intact, brown soft o/p. Concerned about volume that flatus makes from the pouch, WOC nurse consult sched in AM. Voiding adequately per pt. Percocet for pain, judith well. LS clear, IS to 2000. Slept well throughout night. Up independent. Plan to d/c today.

## 2017-04-11 ENCOUNTER — TELEPHONE (OUTPATIENT)
Dept: INTERNAL MEDICINE | Facility: CLINIC | Age: 42
End: 2017-04-11

## 2017-04-11 ENCOUNTER — CARE COORDINATION (OUTPATIENT)
Dept: CARE COORDINATION | Facility: CLINIC | Age: 42
End: 2017-04-11

## 2017-04-11 NOTE — LETTER
[facility] Home  Complex Care Plan  About Me  Patient Name:  Yahaira Ramirez    YOB: 1975  Age:   42 year old   [place]ia MRN: [id number] Telephone Information:     Home Phone [phone]   Mobile [phone]       Address:    [address] [place] [address] [place] [address] Email address:  [email or web address]      Emergency Contact(s)  Name Relationship Lgl Grd Work Phone Home Phone Mobile Phone   CHERRI MAHONEY Mother   [phone]            Primary language:  English     needed? No   [employer]:  [phone] op. 1  Other communication barriers: No  Preferred Method of Communication:  Letter, MyChart, Phone  Current living arrangement: I live in a private home with family  Mobility Status/ Medical Equipment: Independent  Other information to know about me:    Health Maintenance  Health Maintenance Reviewed: Not assessed    My Access Plan  Medical Emergency 911   Primary Clinic Line [facility]- [phone]   24 Hour Appointment Line [phone] or  [phone]) (toll-free)   24 Hour Nurse Line [phone] (toll-free)   Preferred Urgent Care [facility], [phone]   Preferred Hospital [facility]  [phone]   Preferred Pharmacy [id number] - [place], [place] - [address] Behavioral Health Crisis Line [facility], [phone] or 911     My Care Team Members  Patient Care Team       Relationship Specialty Notifications Start End    Guille Torre MD PCP - General   2/15/02     Phone: [phone] Fax: [phone]         [facility] [address] [facility] [address]    Kandis Piper RN Clinic Care Coordinator  Admissions 4/11/17     Phone: [phone] Fax: [phone]        Yair Adkins MD MD [facility]  4/11/17     Phone: [phone] Fax: [phone]         [facility] [address] [facility] [address]         My Care Plans  Self Management and Treatment Plan  Goals and (Comments)  Goal #1: I will watch for signs of infection -I will seek medical help if expereincing increased pain, discharge ,redness ,swelling in surgical area or fever      50% of goal reached    Action Plans on File: None  Advance Care Plans/Directives Type:   Type Advanced Care Plans/Directives:  (NA)    My Medical and Care Information  Problem List   Patient Active Problem List   Diagnosis     Hypertrophy of breast     CARDIOVASCULAR SCREENING; LDL GOAL LESS THAN 160     Hypothyroidism, unspecified hypothyroidism type     Mild intermittent asthma without complication     Major depressive disorder, recurrent episode, mild (H)     Mirena inserted : due for removal 2019 or 2021     Rectal cancer (H)      Current Medications and Allergies:  See printed Medication Report.    Care Coordination Start Date: 04/11/17   Frequency of Care Coordination:  (CC will follow up after home care discharge )   Form Last Updated: 04/11/2017

## 2017-04-11 NOTE — TELEPHONE ENCOUNTER
"Hospital/TCU/ED for chronic condition Discharge Protocol    \"Hi, my name is Gilda Saeed, a registered nurse, and I am calling from [facility].  I am calling to follow up and see how things are going for you after your recent emergency visit/hospital/TCU stay.\"    Tell me how you are doing now that you are home?\" Patient stated to be feeling well.       Discharge Instructions    \"Let's review your discharge instructions.  What is/are the follow-up recommendations?  Pt. Response: Follow up with my oncologist, surgeon, and PCP.       \"Has an appointment with your primary care provider been scheduled?\"   No - scheduled appointment with Dr. Torre 4/18/17 at 1600     \"When you see the provider, I would recommend that you bring your medications with you.\"    Medications    \"Tell me what changed about your medicines when you discharged?\"    Changes to chronic meds?    0-1    \"What questions do you have about your medications?\"    None     New diagnoses of heart failure, COPD, diabetes, or MI?    No              Medication reconciliation completed? Yes  Was MTM referral placed (*Make sure to put transitions as reason for referral)?   No    Call Summary    \"What questions or concerns do you have about your recent visit and your follow-up care?\"     none    \"If you have questions or things don't continue to improve, we encourage you contact us through the main clinic number (give number).  Even if the clinic is not open, triage nurses are available 24/7 to help you.     We would like you to know that our clinic has extended hours (provide information).  We also have urgent care (provide details on closest location and hours/contact info)\"      \"Thank you for your time and take care!\"             "

## 2017-04-11 NOTE — LETTER
[facility]  [address]  [address]  Phone  [phone]  Fax   [phone]            Dear Yahaira,        It was a pleasure to speak with you over the phone on Tuesday .  I would like to provide you with the enclosed emergency contact information for your records.  As part of care coordination, we are developing care plans to assist in accomplishing your health care goals.  When we speak next, please feel free to let me know if you want to add or change any information on your care plans.    As always, feel free to contact me if you have any questions or concerns.  I look forward to working with you in the effort to achieve your health care and wellness goals .        Sincerely,        Kandis Piper RN, Care Coordinator  [facility]   [email or web address]   [phone]

## 2017-04-11 NOTE — PROGRESS NOTES
Clinic Care Coordination Contact  OUTREACH    Referral Information:  Referral Source: CTS  Reason for Contact: Hospitalization 4/5-4/10/2017-Rectal Cancer   Procedure(s): Robotic assisted low anterior resection with laparoscopic assisted diverting loop ileostomy       Care Conference: No     Universal Utilization:   ED Visits in last year: 0  Hospital visits in last year: 4  Last PCP appointment: 03/15/17  Missed Appointments: 0  Concerns: No  Multiple Providers or Specialists:  (Yes)    Clinical Concerns:  Current Medical Concerns: Patient is taking Oxycodone every 8 hours with relief.  Patient is changing her Ileostomy bag on her own and it is draining liquid stool .  Patient is on a low residue diet and no lifting or strenuous exercise and has an appointment with Dr Adkins surgeon in May.   Patient has a home care nurse visit set up for tomorrow.  Patient is giving her own Lovenox injections   Current Behavioral Concerns: Not discussed   Education Provided to patient: CC reviewed the signs of infection    Clinical Pathway Name: None  Clinical Pathway: None    Medication Management:  Reviewed      Functional Status:  Mobility Status: Independent  Equipment Currently Used at Home: none         Psychosocial:  Current living arrangement:: I live in a private home with family     Resources and Interventions:  Current Resources: Home Care; Home Care  PAS Number:  (NA)  Senior Linkage Line Referral Placed:  (NA)  Advanced Care Plans/Directives on file:: No  Referrals Placed:  (NA)     Goals:   Goal 1 Statement: I will watch for signs of infection  Goal 1 Progression Percent: 50%  Goal 1 Progression Date: 04/11/17         Barriers: No barriers identified   Strengths: Receiving home care services   Patient/Caregiver understanding: Patient expresses good understanding of discharge instructions   Frequency of Care Coordination:  (CC will follow up after home care discharge )  Upcoming appointment: 04/18/17     Plan: BABAK  will follow up after home care discharge     Kandis Piper RN  Care Coordinator-[facility]  [email or web address]  [phone]

## 2017-04-17 ENCOUNTER — TRANSFERRED RECORDS (OUTPATIENT)
Dept: HEALTH INFORMATION MANAGEMENT | Facility: CLINIC | Age: 42
End: 2017-04-17

## 2017-04-18 ENCOUNTER — OFFICE VISIT (OUTPATIENT)
Dept: INTERNAL MEDICINE | Facility: CLINIC | Age: 42
End: 2017-04-18
Payer: COMMERCIAL

## 2017-04-18 VITALS
OXYGEN SATURATION: 99 % | DIASTOLIC BLOOD PRESSURE: 68 MMHG | BODY MASS INDEX: 44.03 KG/M2 | TEMPERATURE: 98.4 F | SYSTOLIC BLOOD PRESSURE: 108 MMHG | HEART RATE: 89 BPM | WEIGHT: 264.6 LBS

## 2017-04-18 DIAGNOSIS — J45.20 MILD INTERMITTENT ASTHMA WITHOUT COMPLICATION: ICD-10-CM

## 2017-04-18 DIAGNOSIS — C20 RECTAL CANCER (H): ICD-10-CM

## 2017-04-18 DIAGNOSIS — F33.0 MAJOR DEPRESSIVE DISORDER, RECURRENT EPISODE, MILD (H): ICD-10-CM

## 2017-04-18 DIAGNOSIS — E03.9 ACQUIRED HYPOTHYROIDISM: ICD-10-CM

## 2017-04-18 DIAGNOSIS — Z09 HOSPITAL DISCHARGE FOLLOW-UP: Primary | ICD-10-CM

## 2017-04-18 LAB
ERYTHROCYTE [DISTWIDTH] IN BLOOD BY AUTOMATED COUNT: 14.9 % (ref 10–15)
HCT VFR BLD AUTO: 34.1 % (ref 35–47)
HGB BLD-MCNC: 10.7 G/DL (ref 11.7–15.7)
MCH RBC QN AUTO: 32.2 PG (ref 26.5–33)
MCHC RBC AUTO-ENTMCNC: 31.4 G/DL (ref 31.5–36.5)
MCV RBC AUTO: 103 FL (ref 78–100)
PLATELET # BLD AUTO: 582 10E9/L (ref 150–450)
RBC # BLD AUTO: 3.32 10E12/L (ref 3.8–5.2)
TSH SERPL DL<=0.05 MIU/L-ACNC: 4.22 MU/L (ref 0.4–4)
WBC # BLD AUTO: 8.2 10E9/L (ref 4–11)

## 2017-04-18 PROCEDURE — 84443 ASSAY THYROID STIM HORMONE: CPT | Performed by: INTERNAL MEDICINE

## 2017-04-18 PROCEDURE — 85027 COMPLETE CBC AUTOMATED: CPT | Performed by: INTERNAL MEDICINE

## 2017-04-18 PROCEDURE — 36415 COLL VENOUS BLD VENIPUNCTURE: CPT | Performed by: INTERNAL MEDICINE

## 2017-04-18 PROCEDURE — 99214 OFFICE O/P EST MOD 30 MIN: CPT | Performed by: INTERNAL MEDICINE

## 2017-04-18 RX ORDER — BUPROPION HYDROCHLORIDE 150 MG/1
150 TABLET, EXTENDED RELEASE ORAL 2 TIMES DAILY
Qty: 180 TABLET | Refills: 3 | Status: ON HOLD | OUTPATIENT
Start: 2017-04-18 | End: 2017-09-06

## 2017-04-18 RX ORDER — CITALOPRAM HYDROBROMIDE 40 MG/1
40 TABLET ORAL DAILY
Qty: 90 TABLET | Refills: 3 | Status: ON HOLD | OUTPATIENT
Start: 2017-04-18 | End: 2017-09-06

## 2017-04-18 NOTE — NURSING NOTE
"Chief Complaint   Patient presents with     Hospital F/U       Initial /68  Pulse 89  Temp 98.4  F (36.9  C) (Oral)  Wt 264 lb 9.6 oz (120 kg)  SpO2 99%  BMI 44.03 kg/m2 Estimated body mass index is 44.03 kg/(m^2) as calculated from the following:    Height as of 4/5/17: 5' 5\" (1.651 m).    Weight as of this encounter: 264 lb 9.6 oz (120 kg).  Medication Reconciliation: complete   Estefany Erickson CMA      "

## 2017-04-18 NOTE — PROGRESS NOTES
SUBJECTIVE:                                                    Yahaira Ramirez is a 42 year old female who presents to clinic today for the following health issues:        Hospital Follow-up Visit:    Hospital/Nursing Home/IP Rehab Facility: [facility]  Date of Admission: 4/5/17  Date of Discharge: 4/10/17  Reason(s) for Admission: colon cancer            Problems taking medications regularly:  None       Medication changes since discharge: noted       Problems adhering to non-medication therapy:  None    Summary of hospitalization:  [facility] discharge summary reviewed  Diagnostic Tests/Treatments reviewed.  Follow up needed: surgery  Other Healthcare Providers Involved in Patient s Care:         None  Update since discharge: stable.     Post Discharge Medication Reconciliation: discharge medications reconciled, continue medications without change.  Plan of care communicated with patient     Coding guidelines for this visit:  Type of Medical   Decision Making Face-to-Face Visit       within 7 Days of discharge Face-to-Face Visit        within 14 days of discharge   Moderate Complexity 25099 [id number]   High Complexity 80362 [id number]            Brief Hospital Summary:   Patient is a 42 year old woman. she underwent robotic LAR with lap DLI on 4/5/17 by Dr Adkins.   There were no immediate complications during this procedure.   Please refer to the full operative summary for details. The patient's hospital course was unremarkable. She did have persistent tachycardia and EKG changes on POD#1. Serial troponins were normals and a repeat EKG showed sinus rhythym. Her diet was advanced as she had full return of bowel function through the ileostomy. Her pain was controlled and she was able to void well after her juarez catheter was removed. She recovered as anticipated and experienced no post-operative complications.             Problem list and histories reviewed & adjusted, as indicated.  Additional  history: as documented    Patient Active Problem List   Diagnosis     Hypertrophy of breast     CARDIOVASCULAR SCREENING; LDL GOAL LESS THAN 160     Hypothyroidism, unspecified hypothyroidism type     Mild intermittent asthma without complication     Major depressive disorder, recurrent episode, mild (H)     Mirena inserted : due for removal  or      Rectal cancer (H)     Past Surgical History:   Procedure Laterality Date     C IUD,MIRENA  2016     CHOLECYSTECTOMY       COLONOSCOPY Left 2016    Procedure: COMBINED COLONOSCOPY, SINGLE OR MULTIPLE BIOPSY/POLYPECTOMY BY BIOPSY;  Surgeon: Claudette De La Paz MD;  Location:  GI     DAVINCI COLECTOMY N/A 2017    Procedure: DAVINCI XI COLECTOMY;  Surgeon: Yair Adkins MD;  Location:  OR     ENT SURGERY  2016    Tonsilectomy     EXCISE MASS UPPER EXTREMITY  2012    Procedure: EXCISE MASS UPPER EXTREMITY;  Excision Subcutaneous Mass Right Tricep;  Surgeon: Mehul Maldonado MD;  Location:  OR     EYE SURGERY      Lasik     HC CAUTERY OF CERVIX, CRYOCAUTERY  3/23/03     MAMMOPLASTY REDUCTION BILATERAL  7/3/2014    Procedure: MAMMOPLASTY REDUCTION BILATERAL;  Surgeon: Yoanna Mccabe MD;  Location: [facility]     SIGMOIDOSCOPY FLEXIBLE N/A 3/21/2017    Procedure: SIGMOIDOSCOPY FLEXIBLE;  Surgeon: Yair Adkins MD;  Location:  GI       Social History   Substance Use Topics     Smoking status: Former Smoker     Packs/day: 1.00     Years: 13.00     Types: Cigarettes     Quit date: 2006     Smokeless tobacco: Never Used     Alcohol use No      Comment: 1-2 drinks every 3 months     Family History   Problem Relation Age of Onset     CANCER Maternal Grandfather      -lung     CANCER Paternal Grandfather           Hypertension Father      DIABETES Father 60     type 2     Lipids Maternal Grandmother      CANCER Paternal Grandmother      pancreatic     Hypertension Mother      Colon Cancer No family hx of           Current Outpatient Prescriptions   Medication Sig Dispense Refill     oxyCODONE-acetaminophen (PERCOCET) 5-325 MG per tablet Take 1-2 tablets by mouth every 6 hours as needed for moderate to severe pain 40 tablet 0     enoxaparin (LOVENOX) 40 MG/0.4ML injection Inject 0.4 mLs (40 mg) Subcutaneous every 24 hours 25 Syringe 0     mometasone-formoterol (DULERA) 100-5 MCG/ACT oral inhaler Inhale 2 puffs into the lungs 2 times daily       fluticasone (FLONASE) 50 MCG/ACT spray Spray 2 sprays into both nostrils daily as needed for rhinitis or allergies (Congestion)       Levothyroxine Sodium (SYNTHROID PO) Take 125 mcg by mouth daily       Levothyroxine Sodium (SYNTHROID PO) Take 62.5 mcg by mouth once a week (Sundays) 0.5 x 125 mcg = 62.5 mcg (Patient takes this with her regular daily dose of 125 mcg = 187.5 mcg on Sundays)       IBUPROFEN PO Take 800 mg by mouth daily as needed for moderate pain (Headaches)       Acetaminophen (TYLENOL PO) Take 1,000 mg by mouth daily as needed for mild pain (Headaches)       DiphenhydrAMINE HCl (BENADRYL PO) Take 50 mg by mouth At Bedtime (2 x 25 mg tablet = 50 mg dose)       citalopram (CELEXA) 40 MG tablet Take 1 tablet (40 mg) by mouth daily 30 tablet 0     buPROPion (WELLBUTRIN SR) 150 MG 12 hr tablet Take 1 tablet (150 mg) by mouth 2 times daily 180 tablet 3     levalbuterol (XOPENEX HFA) 45 MCG/ACT inhaler Inhale 1-2 puffs into the lungs every 4 hours as needed for shortness of breath / dyspnea. 1 Inhaler 3     Allergies   Allergen Reactions     Cats      Pollen Extract      Sulfa Drugs Hives     Adhesive Tape Itching and Rash     BP Readings from Last 3 Encounters:   04/10/17 125/78   03/27/17 126/84   03/21/17 135/86    Wt Readings from Last 3 Encounters:   04/05/17 264 lb 9 oz (120 kg)   03/27/17 272 lb (123.4 kg)   03/15/17 269 lb 14.4 oz (122.4 kg)                    Reviewed and updated as needed this visit by clinical staff       Reviewed and updated as needed this  visit by Provider       ROS:  C: NEGATIVE for fever, chills, change in weight  I: NEGATIVE for worrisome rashes, moles or lesions  E: NEGATIVE for vision changes or irritation  E/M: NEGATIVE for ear, mouth and throat problems  R: NEGATIVE for significant cough or SOB  CV: NEGATIVE for chest pain, palpitations or peripheral edema  GI: NEGATIVE for nausea, mild abdominal pain, heartburn, with no change in bowel habits  : NEGATIVE for frequency, dysuria, or hematuria  M: NEGATIVE for significant arthralgias or myalgia  H: NEGATIVE for bleeding problems  P: NEGATIVE for changes in mood or affect    OBJECTIVE:                                                    /68  Pulse 89  Temp 98.4  F (36.9  C) (Oral)  Wt 264 lb 9.6 oz (120 kg)  SpO2 99%  BMI 44.03 kg/m2  Body mass index is 44.03 kg/(m^2).  GENERAL: healthy, alert and no distress  EYES: Eyes grossly normal to inspection, extraocular movements - intact, and PERRL  HENT: ear canals- normal; TMs- normal; Nose- normal; Mouth- no ulcers, no lesions  NECK: no tenderness, no adenopathy, no asymmetry, no masses, no stiffness; thyroid- normal to palpation  RESP: lungs clear to auscultation - no rales, no rhonchi, no wheezes  CV: regular rates and rhythm, normal S1 S2, no S3 or S4 and no click or rub   ABDOMEN: soft, scars noted, + colostomy in place, no tenderness, no  hepatosplenomegaly, no masses, normal bowel sounds  MS: extremities- no gross deformities noted  PSYCH: Alert and oriented times 3; speech- coherent , normal rate and volume; able to articulate logical thoughts, able to abstract reason, no tangential thoughts, no hallucinations or delusions, affect- normal       ASSESSMENT/PLAN:                                                      (Z09) Hospital discharge follow-up  (primary encounter diagnosis)  Comment: as ordered  Plan: CBC with platelets            (C20) Rectal cancer (H)  Comment: on therapy per Oncology and Dr. Adkins  Plan: CBC with  platelets            (F33.0) Major depressive disorder, recurrent episode, mild (H)  Comment: stable on therapy  Plan: citalopram (CELEXA) 40 MG tablet, buPROPion         (WELLBUTRIN SR) 150 MG 12 hr tablet            (J45.20) Mild intermittent asthma without complication  Comment: stable per ACT  Plan: mometasone-formoterol (DULERA) 100-5 MCG/ACT         oral inhaler            (E03.9) Acquired hypothyroidism  Comment: labs as fasting on therapy  Plan: TSH          See Patient Instructions    Guille Torre MD  [facility]    THE MEDICATION LIST HAS BEEN FULLY RECONCILED BY THE M.D. AND THE NURSING STAFF.

## 2017-04-18 NOTE — MR AVS SNAPSHOT
After Visit Summary   4/18/2017    Yahaira Ramirez    MRN: [id number]           Patient Information     Date Of Birth          1975        Visit Information        Provider Department      4/18/2017 4:00 PM Guille Torre MD [facility]        Today's Diagnoses     Hospital discharge follow-up    -  1    Rectal cancer (H)        Major depressive disorder, recurrent episode, mild (H)        Mild intermittent asthma without complication        Acquired hypothyroidism           Follow-ups after your visit        Follow-up notes from your care team     Return if symptoms worsen or fail to improve.      Who to contact     If you have questions or need follow up information about today's clinic visit or your schedule please contact [facility] directly at [phone].  Normal or non-critical lab and imaging results will be communicated to you by Ichibahart, letter or phone within 4 business days after the clinic has received the results. If you do not hear from us within 7 days, please contact the clinic through Ichibahart or phone. If you have a critical or abnormal lab result, we will notify you by phone as soon as possible.  Submit refill requests through SI-BONE or call your pharmacy and they will forward the refill request to us. Please allow 3 business days for your refill to be completed.          Additional Information About Your Visit        MyChart Information     SI-BONE gives you secure access to your electronic health record. If you see a primary care provider, you can also send messages to your care team and make appointments. If you have questions, please call your primary care clinic.  If you do not have a primary care provider, please call [phone] and they will assist you.        Care EveryWhere ID     This is your Care EveryWhere ID. This could be used by other organizations to access your [facility] medical records  [id number]         Your Vitals Were     Pulse Temperature Pulse Oximetry BMI (Body Mass Index)          89 98.4  F (36.9  C) (Oral) 99% 44.03 kg/m2         Blood Pressure from Last 3 Encounters:   04/18/17 108/68   04/10/17 125/78   03/27/17 126/84    Weight from Last 3 Encounters:   04/18/17 264 lb 9.6 oz (120 kg)   04/05/17 264 lb 9 oz (120 kg)   03/27/17 272 lb (123.4 kg)              We Performed the Following     CBC with platelets     TSH          Where to get your medicines      These medications were sent to [place] [id number] [facility] - [place], [place] - [address]  [address] [facility] [place] [address]     Phone:  [phone]     buPROPion 150 MG 12 hr tablet    citalopram 40 MG tablet    mometasone-formoterol 100-5 MCG/ACT oral inhaler          Primary Care Provider Office Phone # Fax #    Guille Torre [phone]       [facility] [address] [facility] [address]        Thank you!     Thank you for choosing [facility]  for your care. Our goal is always to provide you with excellent care. Hearing back from our patients is one way we can continue to improve our services. Please take a few minutes to complete the written survey that you may receive in the mail after your visit with us. Thank you!             Your Updated Medication List - Protect others around you: Learn how to safely use, store and throw away your medicines at www.disposemymeds.org.          This list is accurate as of: 4/18/17  4:29 PM.  Always use your most recent med list.                   Brand Name Dispense Instructions for use    BENADRYL PO      Take 50 mg by mouth At Bedtime (2 x 25 mg tablet = 50 mg dose)       buPROPion 150 MG 12 hr tablet    WELLBUTRIN SR    180 tablet    Take 1 tablet (150 mg) by mouth 2 times daily       citalopram 40 MG tablet    celeXA    90 tablet    Take 1 tablet (40 mg) by mouth daily       enoxaparin 40 MG/0.4ML injection    LOVENOX    25 Syringe    Inject  0.4 mLs (40 mg) Subcutaneous every 24 hours       fluticasone 50 MCG/ACT spray    FLONASE     Spray 2 sprays into both nostrils daily as needed for rhinitis or allergies (Congestion)       IBUPROFEN PO      Take 800 mg by mouth daily as needed for moderate pain (Headaches)       levalbuterol 45 MCG/ACT Inhaler    XOPENEX HFA    1 Inhaler    Inhale 1-2 puffs into the lungs every 4 hours as needed for shortness of breath / dyspnea.       mometasone-formoterol 100-5 MCG/ACT oral inhaler    DULERA    1 Inhaler    Inhale 2 puffs into the lungs 2 times daily       oxyCODONE-acetaminophen 5-325 MG per tablet    PERCOCET    40 tablet    Take 1-2 tablets by mouth every 6 hours as needed for moderate to severe pain       * SYNTHROID PO      Take 125 mcg by mouth daily       * SYNTHROID PO      Take 62.5 mcg by mouth once a week (Sundays) 0.5 x 125 mcg = 62.5 mcg (Patient takes this with her regular daily dose of 125 mcg = 187.5 mcg on Sundays)       TYLENOL PO      Take 1,000 mg by mouth daily as needed for mild pain (Headaches)       * Notice:  This list has 2 medication(s) that are the same as other medications prescribed for you. Read the directions carefully, and ask your doctor or other care provider to review them with you.

## 2017-04-19 ASSESSMENT — PATIENT HEALTH QUESTIONNAIRE - PHQ9: SUM OF ALL RESPONSES TO PHQ QUESTIONS 1-9: 1

## 2017-04-19 ASSESSMENT — ASTHMA QUESTIONNAIRES: ACT_TOTALSCORE: 24

## 2017-04-20 ENCOUNTER — MEDICAL CORRESPONDENCE (OUTPATIENT)
Dept: HEALTH INFORMATION MANAGEMENT | Facility: CLINIC | Age: 42
End: 2017-04-20

## 2017-04-23 DIAGNOSIS — F33.0 MAJOR DEPRESSIVE DISORDER, RECURRENT EPISODE, MILD (H): ICD-10-CM

## 2017-04-23 DIAGNOSIS — J01.90 ACUTE SINUSITIS WITH SYMPTOMS > 10 DAYS: ICD-10-CM

## 2017-04-25 RX ORDER — FLUTICASONE PROPIONATE 50 MCG
SPRAY, SUSPENSION (ML) NASAL
Qty: 16 ML | Refills: 0 | Status: SHIPPED | OUTPATIENT
Start: 2017-04-25 | End: 2017-05-30

## 2017-04-25 RX ORDER — CITALOPRAM HYDROBROMIDE 40 MG/1
TABLET ORAL
Qty: 30 TABLET | Refills: 0 | OUTPATIENT
Start: 2017-04-25

## 2017-04-25 NOTE — TELEPHONE ENCOUNTER
Routing refill request to provider for review/approval because:  Drug not active on patient's medication list    Yumiko Claudio RN  [facility]  [phone]

## 2017-04-25 NOTE — TELEPHONE ENCOUNTER
Celexa   Last Written Prescription Date: 4/18/17  Last Fill Quantity: 90, # refills: 3  Last Office Visit with [facility] primary care provider:  3/27/17        Last PHQ-9 score on record=   PHQ-9 SCORE 4/18/2017   Total Score 1       Fluticasone spray    Last Written Prescription Date: 4/5/17  Last Fill Quantity: 1, # refills: ?? Note listed    Last Office Visit with [facility], [facility] or [facility] prescribing provider:  3/27/17   Future Office Visit:       Date of Last Asthma Action Plan Letter:   Asthma Action Plan Q1 Year    Topic Date Due     Asthma Action Plan - yearly  10/28/2017      Asthma Control Test:   ACT Total Scores 4/18/2017   ACT TOTAL SCORE (Goal Greater than or Equal to 20) 24   In the past 12 months, how many times did you visit the emergency room for your asthma without being admitted to the hospital? 0   In the past 12 months, how many times were you hospitalized overnight because of your asthma? 0       Date of Last Spirometry Test:   No results found for this or any previous visit.      SHAHLA Rizvi LPN

## 2017-05-01 ENCOUNTER — TRANSFERRED RECORDS (OUTPATIENT)
Dept: HEALTH INFORMATION MANAGEMENT | Facility: CLINIC | Age: 42
End: 2017-05-01

## 2017-05-02 ENCOUNTER — MEDICAL CORRESPONDENCE (OUTPATIENT)
Dept: HEALTH INFORMATION MANAGEMENT | Facility: CLINIC | Age: 42
End: 2017-05-02

## 2017-05-08 ENCOUNTER — TRANSFERRED RECORDS (OUTPATIENT)
Dept: HEALTH INFORMATION MANAGEMENT | Facility: CLINIC | Age: 42
End: 2017-05-08

## 2017-05-11 ENCOUNTER — CARE COORDINATION (OUTPATIENT)
Dept: CARE COORDINATION | Facility: CLINIC | Age: 42
End: 2017-05-11

## 2017-05-11 NOTE — PROGRESS NOTES
Clinic Care Coordination Contact  OUTREACH    Referral Information:  Referral Source: [facility]  Reason for Contact: Reason for Contact: Hospitalization 4/5-4/10/2017-Rectal Cancer   Procedure(s): Robotic assisted low anterior resection with laparoscopic assisted diverting loop ileostomy       Care Conference: No     Universal Utilization:   ED Visits in last year: 0  Hospital visits in last year: 4  Last PCP appointment: 03/15/17  Missed Appointments: 0  Concerns: No  Multiple Providers or Specialists:  (Yes)    Clinic Care Coordination Contact  [facility]/Voicemail    Referral Source: [facility]  Clinical Data: Care Coordinator Outreach  Outreach attempted x 1.  Left message on voicemail with call back information and requested return call.  Plan: Care Coordinator will try to reach patient again in 3-5 business days.  Kandis Piper RN  Care Coordinator-[facility]  [email or web address]  [phone]

## 2017-05-12 ENCOUNTER — HOSPITAL ENCOUNTER (OUTPATIENT)
Dept: WOUND CARE | Facility: CLINIC | Age: 42
Discharge: HOME | End: 2017-05-12
Attending: COLON & RECTAL SURGERY | Admitting: COLON & RECTAL SURGERY
Payer: COMMERCIAL

## 2017-05-12 PROCEDURE — 99211 OFF/OP EST MAY X REQ PHY/QHP: CPT

## 2017-05-12 NOTE — PROGRESS NOTES
"[facility] Nurse Outpatient Ostomy Assessment      Initial Assessment of ostomy and needs for:  Diverting loop ileostomy      Data:   History:   Per MD note(s):     robotic assisted LAR with DLI for cT3N1 rectal adenocarcinoma    5-12-17: DR Adkins referred pt for prosthetic refitting. Pt has been discharged from Home Health Care.  Pt has completed one round of chemo.  Scheduled for takedown surgery mid-September.  Reports difficult                 Pouching issues since leaving hospital with terrible leakage.  Current pouch working the best and has been on 4 day. Pt returned to work this week. Mom is present and assists with appliance changes.    Type of ostomy: New Diverting Loop ileostomy    Stoma assessment:   ? Stoma 1 1/8\", rounded, red,moist, protrudes, central functional lumen  Mucocutaneous Junction (MCJ): intact with sutures  Peristomal skin:  Ring of erythema/denudement .5cm from 8-4 o'clock, remainder ingrid-stomal skin intact without erythema. No satellite lesions noted  Abdominal assessment:  Rounded, distended and frim. When sitting stoma retracts into deep crease with abd folds superior and distal to stoma    Output: small amt green semi-mushy green/brown fecal output  Current pouching system:   Coloplast Sensura  light debra cut to fit with thin brava ring # [id number]    ? Pain: tenderness around stoma    Diet:       Active Diet Order    Low residue          Intervention:     Patient's chart evaluated.      Assessments done today:  Stoma, ingrid-stomal skin and  pouching system     Education:  Discussed reason for leakages.  Ingrid-stomal irritation r/t retraction of stoma into deep crease when sitting.            Assessment:     Stoma assessment: stoma viable and functioning    Ingrid-stomal denudement not infection or candida        Effectiveness of current pouching/ supply plan: trial small convex pouch         Plan:     Plan:          -Trial Coloplast sensura light convex #88424,         "  -Continue thin brava ring to ingrid-stomal skin         -Treat ingrid-stomal skin with powder/prep         -Brava strips to outside edge of pouching system            Pt given samples to trial        F/U prn ?/concerns    Face time:  15 minutes  Edu: 30 minutes                                                 e                     o EpiVax     Face to face time: 15 minutes  EDU 25 minutes

## 2017-05-13 ENCOUNTER — TRANSFERRED RECORDS (OUTPATIENT)
Dept: HEALTH INFORMATION MANAGEMENT | Facility: CLINIC | Age: 42
End: 2017-05-13

## 2017-05-15 ENCOUNTER — TRANSFERRED RECORDS (OUTPATIENT)
Dept: HEALTH INFORMATION MANAGEMENT | Facility: CLINIC | Age: 42
End: 2017-05-15

## 2017-05-16 DIAGNOSIS — E03.9 HYPOTHYROIDISM, UNSPECIFIED TYPE: ICD-10-CM

## 2017-05-17 RX ORDER — LEVOTHYROXINE SODIUM 125 UG/1
TABLET ORAL
Qty: 45 TABLET | Refills: 0 | Status: SHIPPED | OUTPATIENT
Start: 2017-05-17 | End: 2017-06-30

## 2017-05-17 NOTE — TELEPHONE ENCOUNTER
levothyroxine     Last Written Prescription Date: HISTORICAL  Last Quantity: NA, # refills: NA  Last Office Visit with [facility] or [facility] prescribing provider: 4/18/17        TSH   Date Value Ref Range Status   04/18/2017 4.22 (H) 0.40 - 4.00 mU/L Final     Routing refill request to provider for review/approval because:  Labs out of range:  TSH  Medication is reported/historical

## 2017-05-18 NOTE — PROGRESS NOTES
Clinic Care Coordination Contact  OUTREACH    Referral Information:  Referral Source: [facility]  Reason for Contact: Reason for Contact: Reason for Contact: Hospitalization 4/5-4/10/2017-Rectal Cancer   Procedure(s): Robotic assisted low anterior resection with laparoscopic assisted diverting loop ileostomy      Takedown surgery scheduled 9/6/2017  Care Conference: No     Universal Utilization:   ED Visits in last year: 0  Hospital visits in last year: 4  Last PCP appointment: 03/15/17  Missed Appointments: 0  Concerns: No  Multiple Providers or Specialists:  (Yes)    Clinical Concerns:  Current Medical Concerns: Patient report the stoma still becomes irritated at times with some bleeding.  Patient states her mother assists with changing the bag because it is in a position that is hard for the patient to see. Draining freely . Reports a little tired form the chemotherapy and steroids   Current Behavioral Concerns: Not discussed     Education Provided to patient: instructed on signs of infection and expresses understanding   Clinical Pathway Name: None  Clinical Pathway: None    Medication Management:  Not discussed today      Functional Status:  Mobility Status: Independent  Equipment Currently Used at Home: none  Psychosocial:  Current living arrangement:: I live in a private home with family     Resources and Interventions:  Current Resources: Home Care; Home Care  PAS Number:  (NA)  Senior Linkage Line Referral Placed:  (NA)  Advanced Care Plans/Directives on file:: No  Referrals Placed:  (NA)     Goals:   Goal 1 Statement: I will watch for signs of infection  Goal 1 Progression Percent: 90%  Goal 1 Progression Date: 05/18/17         Barriers: irritated stoma intermittently but no concerns of infection  Strengths: mother is a great support     Frequency of Care Coordination:  (CC will follow up after home care discharge )  Upcoming appointment: 04/18/17     Plan: CC will follow up after Takedown surgery  9/6/2017    Kandis Piper, RN  Care Coordinator-[facility]  [email or web address]  [phone]

## 2017-05-25 ENCOUNTER — HOSPITAL ENCOUNTER (OUTPATIENT)
Dept: WOUND CARE | Facility: CLINIC | Age: 42
Discharge: HOME | End: 2017-05-25
Attending: COLON & RECTAL SURGERY | Admitting: COLON & RECTAL SURGERY
Payer: COMMERCIAL

## 2017-05-25 PROCEDURE — 99211 OFF/OP EST MAY X REQ PHY/QHP: CPT

## 2017-05-25 NOTE — PROGRESS NOTES
"[facility] Nurse Outpatient Ostomy Assessment      Initial Assessment of ostomy and needs for:  Diverting loop ileostomy      Data:   History:   Per MD note(s):     robotic assisted LAR with DLI for cT3N1 rectal adenocarcinoma    5-12-17: DR Adkins referred pt for prosthetic refitting. Pt has been discharged from Home [facility] Care.  Pt has completed one round of chemo.  Scheduled for takedown surgery mid-September.  Reports difficult                 Pouching issues since leaving hospital with terrible leakage.  Current pouch working the best and has been on 4 day. Pt returned to work this week. Mom is present and assists with appliance changes.    5-25-17:  RTC for ileostomy pouch refitting. Continues with occasional leakage.  C/o burning and bleeding in ingrid-stomal skin. Pt taking 2 Immodium 3x/day with varied results.  Every other week chemo effect output with increase in liquid output.  Likes the Coloplast light convex cut to fit EMMANUELLE with brava ring.  Pt is placing ring on skin vs back of barrier prior to placement.     Type of ostomy: New Diverting Loop ileostomy    Stoma assessment:   ? Stoma  Slightly oval 1 1/8\" x 1\" , red,moist, flush, central functional lumen  Mucocutaneous Junction (MCJ): intact with sutures  Peristomal skin:  Ring of erythema/denudement .5cm in concentric ring aroungd stoma with bleeding.  Remainder ingrid-stomal skin intact without erythema.   Abdominal assessment:  Rounded and frim. When sitting stoma retracts into deep crease with abd folds superior and distal to stoma    Output: small amt green mushy green/brown fecal output. Pt took Imodium prior to arrival  Current pouching system:   Coloplast Sensura  light emmanuelle cut to fit with thin brava ring # [id number]    ? Pain: tenderness around stoma    Diet:       Active Diet Order    Low residue          Intervention:     Patient's chart evaluated.      Assessments done today:  Stoma, ingrid-stomal skin and  pouching system "     Education:  Discussed reason for leakages.  Ingrid-stomal irritation r/t retraction of stoma into deep crease when sitting.            Assessment:     Stoma assessment: stoma viable and functioning    Ingrid-stomal denudement  Secondary to leakage        Effectiveness of current pouching/ supply plan: trial small convex pouch. Apply ring to pouch not skin         Plan:     Plan:          -Trial Coloplast sensura light convex #27625,          -Continue thin brava ring to ingrid-stomal skin         -Treat ingrid-stomal skin with powder/prep. Repeat as needed         -Brava strips to outside edge of pouching system            Pt given samples to trial        F/U in 1 1/2 weeks for follow-up or as needed for ?/concerns    Face time:  15 minutes  Edu: 30 minutes                                                  e                     o Supply ReacciÃ³n     Face to face time: 15 minutes  EDU 25 minutes

## 2017-05-30 ENCOUNTER — TRANSFERRED RECORDS (OUTPATIENT)
Dept: HEALTH INFORMATION MANAGEMENT | Facility: CLINIC | Age: 42
End: 2017-05-30

## 2017-05-30 ENCOUNTER — MEDICAL CORRESPONDENCE (OUTPATIENT)
Dept: HEALTH INFORMATION MANAGEMENT | Facility: CLINIC | Age: 42
End: 2017-05-30

## 2017-05-30 DIAGNOSIS — J01.90 ACUTE SINUSITIS WITH SYMPTOMS > 10 DAYS: ICD-10-CM

## 2017-05-31 RX ORDER — FLUTICASONE PROPIONATE 50 MCG
SPRAY, SUSPENSION (ML) NASAL
Qty: 16 ML | Refills: 10 | Status: ON HOLD | OUTPATIENT
Start: 2017-05-31 | End: 2017-09-06

## 2017-06-08 ENCOUNTER — HOSPITAL ENCOUNTER (OUTPATIENT)
Dept: WOUND CARE | Facility: CLINIC | Age: 42
Discharge: HOME | End: 2017-06-08
Attending: COLON & RECTAL SURGERY | Admitting: COLON & RECTAL SURGERY
Payer: COMMERCIAL

## 2017-06-08 PROCEDURE — 99211 OFF/OP EST MAY X REQ PHY/QHP: CPT

## 2017-06-08 PROCEDURE — 40000901 ZZH STATISTIC WOC PT EDUCATION, 0-15 MIN

## 2017-06-08 NOTE — PROGRESS NOTES
"[facility] Nurse Outpatient Ostomy Assessment      Initial Assessment of ostomy and needs for:  Diverting loop ileostomy      Data:   History:   Per MD note(s):     robotic assisted LAR with DLI for cT3N1 rectal adenocarcinoma    5-12-17: DR Adkins referred pt for prosthetic refitting. [facility]  Pt has completed one round of chemo.  Scheduled for takedown surgery mid-September.  Reports difficult                 Pouching issues since leaving hospital with terrible leakage.  Current pouch working the best and has been on 4 day. Pt returned to work this week. Mom is present and assists with appliance changes.    5-25-17:  RTC for ileostomy pouch refitting. Continues with occasional leakage.  C/o burning and bleeding in ingrid-stomal skin. Pt taking 2 Immodium 3x/day with varied results.  Every other week chemo effect output with increase in liquid output.  Likes the Coloplast light convex cut to fit EMMANUELLE with brava ring.  Pt is placing ring on skin vs back of barrier prior to placement.     6-8-17: RTC for ileostomy pouch refitting. Leakage following chemo secondary to higher outputs. C/O burning and bleeding in ingrid-stomal skin.   ContinuesColoplast light convex cut to fit EMMANUELLE with brava ring.    Type of ostomy: New Diverting Loop ileostomy    Stoma assessment:   ? Stoma  Slightly oval 1 1/8\" x 1\" , red,moist, flush, central functional lumen  Mucocutaneous Junction (MCJ):Healed but stoma now sitting below skin level.   Peristomal skin:  Ring of erythema/denudement .5cm in concentric ring aroungd stoma with bleeding.  Remainder ingrid-stomal skin intact without erythema.   Abdominal assessment:  Rounded and frim. When sitting stoma retracts into deep crease with abd folds superior and distal to stoma    Output: small amt green mushy green/brown fecal output. Pt took Imodium prior to arrival  Current pouching system:   Coloplast Sensura  light emmanuelle cut to fit with " thin brava ring # [id number]    ? Pain: tenderness around stoma    Diet:       Active Diet Order    Low residue          Intervention:     Patient's chart evaluated.      Assessments done today:  Stoma, ingrid-stomal skin and  pouching system     Education:  Discussed reason for leakages.  Ingrid-stomal irritation r/t retraction of stoma into deep crease when sitting.            Assessment:     Stoma assessment: stoma viable and functioning    Ingrid-stomal denudement  Secondary to leakage        Effectiveness of current pouching/ supply plan: trial small convex pouch. Apply ring to pouch not skin         Plan:     Plan:          -Trial Coloplast sensura light convex #79071,          -Continue thin brava ring to ingrid-stomal skin         -Treat ingrid-stomal skin with powder/prep. Repeat as needed         -Brava strips to outside edge of pouching system            Pt given samples to trial        F/U in 1 1/2 weeks for follow-up or as needed for ?/concerns          Discussed getting RX of Lomotil for post-chemo increase in liquid output.        Resume drinking sports drinks following chemo    Face time:  15 minutes  Edu: 30 minutes                                                  e                     o [email or web address]     Face to face time: 15 minutes  EDU 25 minutes

## 2017-06-12 ENCOUNTER — TRANSFERRED RECORDS (OUTPATIENT)
Dept: HEALTH INFORMATION MANAGEMENT | Facility: CLINIC | Age: 42
End: 2017-06-12

## 2017-06-22 ENCOUNTER — HOSPITAL ENCOUNTER (OUTPATIENT)
Dept: WOUND CARE | Facility: CLINIC | Age: 42
Discharge: HOME | End: 2017-06-22
Attending: COLON & RECTAL SURGERY | Admitting: COLON & RECTAL SURGERY
Payer: COMMERCIAL

## 2017-06-22 PROCEDURE — 99211 OFF/OP EST MAY X REQ PHY/QHP: CPT

## 2017-06-22 NOTE — PROGRESS NOTES
"[facility] Nurse Outpatient Ostomy Assessment      Initial Assessment of ostomy and needs for:  Diverting loop ileostomy      Data:   History:   Per MD note(s):     robotic assisted LAR with DLI for cT3N1 rectal adenocarcinoma    5-12-17: DR Adkins referred pt for prosthetic refitting. [facility] been [facility].  Pt has completed one round of chemo.  Scheduled for takedown surgery mid-September.  Reports difficult                 Pouching issues since leaving hospital with terrible leakage.  Current pouch working the best and has been on 4 day. Pt returned to work this week. Mom is present and assists with appliance changes.    5-25-17:  RTC for ileostomy pouch refitting. Continues with occasional leakage.  C/o burning and bleeding in ingrid-stomal skin. Pt taking 2 Immodium 3x/day with varied results.  Every other week chemo effect output with increase in liquid output.  Likes the Coloplast light convex cut to fit EMMANUELLE with brava ring.  Pt is placing ring on skin vs back of barrier prior to placement.     6-8-17: RTC for ileostomy pouch refitting. Leakage following chemo secondary to higher outputs. C/O burning and bleeding in ignrid-stomal skin.   ContinuesColoplast light convex cut to fit EMMANUELLE with brava ring.    6-22-17:  RTC for ileostomy pouch refitting.  Pt was given trial of Jareth Permier Light convex cut to fit with peek a steward window and lock n roll closure.  Pt states skin dramatically improved with use of this appliance with ring , increase in Lomotil crusting.  Increase to 2 day wear time.     Type of ostomy: New Diverting Loop ileostomy    Stoma assessment:   ? Stoma  Slightly oval 1 1/8\" x 1\" , red,moist, flush, central functional lumen. Functional superior lumen  Mucocutaneous Junction (MCJ):Healed but stoma now sitting below skin level.   Peristomal skin:   Previous concentric ring of erythema/denudement healed.           Now small amt bleeding denudement from " 4-7 o'clock at edge of MCJ with small amt hyperplasia          Remainder ingrid-stomal skin intact without erythema.   Abdominal assessment:  Rounded and frim. When sitting stoma retracts into deep crease with abd folds superior and distal to stoma    Output: small amt green mushy  fecal output. Pt took Imodium prior to arrival  Current pouching system:  Jareth Premier cut to fit convex light with peek a steward window, lock n roll and filter    ? Pain:  Minimal tenderness today    Diet:       Active Diet Order    Low residue          Intervention:     Patient's chart evaluated.      Assessments done today:  Stoma, ingrid-stomal skin and  pouching system     Education:  Discussed cutting off peek a steward window to allow better visualization for centering stoma in opening of barrier.                           Continue to apply ring to pouch           Assessment:     Stoma assessment: stoma viable and functioning    Ingrid-stomal denudement: improved with use of lomotil and change in barrier        Effectiveness of current pouching/ supply plan: trial small convex pouch.          Plan:     Plan:          -Trial jareth light convex premier cut to fit with tape, filter, window and lock n roll          -Continue thin brava ring to ingrid-stomal skin         -Treat ingrid-stomal skin with powder/prep. Repeat as needed         -Brava strips to outside edge of pouching system prn            Pt given samples to trial        F/U in 1 1/2 weeks for follow-up or as needed for ?/concerns          Continue RX of Lomotil for post-chemo increase in liquid output.        Resume drinking sports drinks following chemo    Face time:  15 minutes  Edu: 30 min

## 2017-06-27 ENCOUNTER — TELEPHONE (OUTPATIENT)
Dept: INTERNAL MEDICINE | Facility: CLINIC | Age: 42
End: 2017-06-27

## 2017-06-27 DIAGNOSIS — C20 RECTAL CANCER (H): Primary | ICD-10-CM

## 2017-06-27 NOTE — TELEPHONE ENCOUNTER
Patient states she spoke with PCP about needing additional ostomy supplies as she is currently going through more than prescribed. Fax received from [facility] with pts current Rx, requesting that order is doubled and new Rx sent to [facility]. Order pended for PCP review and sign.

## 2017-06-30 DIAGNOSIS — E03.9 HYPOTHYROIDISM, UNSPECIFIED TYPE: ICD-10-CM

## 2017-06-30 RX ORDER — LEVOTHYROXINE SODIUM 125 UG/1
TABLET ORAL
Qty: 45 TABLET | Refills: 3 | Status: SHIPPED | OUTPATIENT
Start: 2017-06-30 | End: 2017-08-22 | Stop reason: DRUGHIGH

## 2017-06-30 NOTE — TELEPHONE ENCOUNTER
levothyroxine     Last Written Prescription Date: 05/17/17  Last Quantity: 45, # refills: 0  Last Office Visit with G, P or [facility] prescribing provider: 04/18/17        TSH   Date Value Ref Range Status   04/18/2017 4.22 (H) 0.40 - 4.00 mU/L Final

## 2017-07-06 ENCOUNTER — HOSPITAL ENCOUNTER (OUTPATIENT)
Dept: WOUND CARE | Facility: CLINIC | Age: 42
Discharge: HOME | End: 2017-07-06
Attending: COLON & RECTAL SURGERY | Admitting: COLON & RECTAL SURGERY
Payer: COMMERCIAL

## 2017-07-06 PROCEDURE — 99211 OFF/OP EST MAY X REQ PHY/QHP: CPT

## 2017-07-06 NOTE — PROGRESS NOTES
[facility] Nurse Outpatient Ostomy Assessment      Initial Assessment of ostomy and needs for:  Diverting loop ileostomy      Data:   History:   Per MD note(s):     robotic assisted LAR with DLI for cT3N1 rectal adenocarcinoma    5-12-17: DR Adkins referred pt for prosthetic refitting. [facility] been [facility].  Pt has completed one round of chemo.  Scheduled for takedown surgery mid-September.  Reports difficult                 Pouching issues since leaving hospital with terrible leakage.  Current pouch working the best and has been on 4 day. Pt returned to work this week. Mom is present and assists with appliance changes.    5-25-17:  RTC for ileostomy pouch refitting. Continues with occasional leakage.  C/o burning and bleeding in ingrid-stomal skin. Pt taking 2 Immodium 3x/day with varied results.  Every other week chemo effect output with increase in liquid output.  Likes the Coloplast light convex cut to fit EMMANUELLE with brava ring.  Pt is placing ring on skin vs back of barrier prior to placement.     6-8-17: RTC for ileostomy pouch refitting. Leakage following chemo secondary to higher outputs. C/O burning and bleeding in ingrid-stomal skin.   ContinuesColoplast light convex cut to fit EMMANUELLE with brava ring.    6-22-17:  RTC for ileostomy pouch refitting.  Pt was given trial of Jareth Permier Light convex cut to fit, transparent and lock n roll closure.  Pt states skin dramatically improved with use of this appliance with ring , increase in Lomotil crusting.  Increase to 2 day wear time.     7-6-17: RTC for ileostomy pouch refitting. Pt continues to use Jareth Premier Light convex cut to fit with lock n roll closure.  Pt continues to experience ingrid-stomal skin irritation /dendudement but less with new appliance and Lomotil.  Continues to change appliance every day to QOD.  3 x chemo tx remaining.     Type of ostomy: New Diverting Loop ileostomy    Stoma assessment:  "  ? Stoma  Slightly oval 1 1/8\" x 1\" , red,moist, flush, central functional lumen. Functional superior lumen  Mucocutaneous Junction (MCJ):Healed but lumen now sitting below skin level.   Peristomal skin:   Previous concentric ring of erythema/denudement healed.           Now small amt bleeding denudement from 4-7 o'clock at edge of MCJ with small amt hyperplasia          Remainder ingrid-stomal skin intact without erythema.   Abdominal assessment:  Rounded and frim. When sitting stoma retracts into deep crease with abd folds superior and distal to stoma    Output: small amt green mushy  fecal output. Pt took Imodium prior to arrival  Current pouching system:  Jareth Premier cut to fit convex light , peek a steward (Cut off fabric)  lock n roll and filter    ? Pain:  Minimal tenderness today    Diet:       Active Diet Order    Low residue          Intervention:     Patient's chart evaluated.      Assessments done today:  Stoma, ingrid-stomal skin and  pouching system     Education:  No change to current system.                            Continue to apply ring to pouch           Assessment:     Stoma assessment: stoma viable and functioning    Ingrid-stomal denudement: improved with use of lomotil and change in barrier        Effectiveness of current pouching/ supply plan: No change to current system.          Plan:     Plan:          -Trial jareth light convex premier cut to fit with tape, filter, window and lock n roll          -Continue thin brava ring to ingrid-stomal skin         -Treat ingrid-stomal skin with powder/prep. Repeat as needed         -Brava strips to outside edge of pouching system prn            Pt given samples to trial        F/U in 1 - 2  weeks for follow-up or as needed for ?/concerns          Continue RX of Lomotil for post-chemo increase in liquid output.        Resume drinking sports drinks following chemo    Face time:  15 minutes  Edu: 30 min        "

## 2017-07-10 ENCOUNTER — TRANSFERRED RECORDS (OUTPATIENT)
Dept: HEALTH INFORMATION MANAGEMENT | Facility: CLINIC | Age: 42
End: 2017-07-10

## 2017-07-12 ENCOUNTER — CARE COORDINATION (OUTPATIENT)
Dept: CARE COORDINATION | Facility: CLINIC | Age: 42
End: 2017-07-12

## 2017-07-12 NOTE — PROGRESS NOTES
Clinic Care Coordination Contact    Situation: Patient chart reviewed by care coordinator.    Background: Referral Source: CTS  Reason for Contact: Reason for Contact: Reason for Contact: Hospitalization 4/5-4/10/2017-Rectal Cancer   Procedure(s): Robotic assisted low anterior resection with laparoscopic assisted diverting loop ileostomy         Assessment: Continues to follow Oncology closely     Plan/Recommendations: CC will follow up after Takedown surgical procedure in September     Kandis Piper RN / Clinical Care Coordinator     [address] [address]  [email or web address]  Office :  [phone] / Fax :  [phone]

## 2017-07-21 ENCOUNTER — HOSPITAL ENCOUNTER (OUTPATIENT)
Dept: WOUND CARE | Facility: CLINIC | Age: 42
Discharge: HOME | End: 2017-07-21
Attending: COLON & RECTAL SURGERY | Admitting: COLON & RECTAL SURGERY
Payer: COMMERCIAL

## 2017-07-21 PROCEDURE — 99211 OFF/OP EST MAY X REQ PHY/QHP: CPT

## 2017-07-24 ENCOUNTER — TRANSFERRED RECORDS (OUTPATIENT)
Dept: HEALTH INFORMATION MANAGEMENT | Facility: CLINIC | Age: 42
End: 2017-07-24

## 2017-07-24 NOTE — PROGRESS NOTES
[facility] Nurse Outpatient Ostomy Assessment      Initial Assessment of ostomy and needs for:  Diverting loop ileostomy      Data:   History:   Per MD note(s):     robotic assisted LAR with DLI for cT3N1 rectal adenocarcinoma    5-12-17: DR Adkins referred pt for prosthetic refitting. [facility].  Pt has completed one round of chemo.  Scheduled for takedown surgery mid-September.  Reports difficult                 Pouching issues since leaving hospital with terrible leakage.  Current pouch working the best and has been on 4 day. Pt returned to work this week. Mom is present and assists with appliance changes.    5-25-17:  RTC for ileostomy pouch refitting. Continues with occasional leakage.  C/o burning and bleeding in ingrid-stomal skin. Pt taking 2 Immodium 3x/day with varied results.  Every other week chemo effect output with increase in liquid output.  Likes the Coloplast light convex cut to fit EMMANUELLE with brava ring.  Pt is placing ring on skin vs back of barrier prior to placement.     6-8-17: RTC for ileostomy pouch refitting. Leakage following chemo secondary to higher outputs. C/O burning and bleeding in ingrid-stomal skin.   ContinuesColoplast light convex cut to fit EMMANUELLE with brava ring.    6-22-17:  RTC for ileostomy pouch refitting.  Pt was given trial of Jareth Permier Light convex cut to fit, transparent and lock n roll closure.  Pt states skin dramatically improved with use of this appliance with ring , increase in Lomotil crusting.  Increase to 2 day wear time.     7-6-17: RTC for ileostomy pouch refitting. Pt continues to use Jareth Premier Light convex cut to fit with lock n roll closure.  Pt continues to experience ingrid-stomal skin irritation /dendudement but less with new appliance and Lomotil.  Continues to change appliance every day to QOD.  3 x chemo tx remaining.     7-21-17:  RTC for ileostomy pouch refitting. Pouching system:  "Jareth Premier Light convex cut to fit with lock n roll closure and ring.  Wear time: 2-3 days. Continuing to experience ingrid-stomal irritation/denudement but less with new appliance and Lomotil.  2 x chemo tx remaining.     Type of ostomy: New Diverting Loop ileostomy    Stoma assessment:   ? Stoma  Slightly oval 1 1/8\" x 1\" , red,moist, flush, central functional lumen. Functional superior lumen  Mucocutaneous Junction (MCJ):Healed but lumen now sitting below skin level.   Peristomal skin:   Previous concentric ring of erythema/denudement healed.           Now small amt bleeding denudement from 4-7 o'clock at edge of MCJ with small amt hyperplasia          Remainder ingrid-stomal skin intact without erythema.   Abdominal assessment:  Rounded and frim. When sitting stoma retracts into deep crease with abd folds superior and distal to stoma    Output: small amt green mushy fecal output. Pt took Imodium prior to arrival  Current pouching system:  Jareth Premier cut to fit convex light , peek a steward (Cut off fabric)  lock n roll and filter. Added ring today    ? Pain:  Minimal tenderness today    Diet:       Active Diet Order    Low residue          Intervention:     Patient's chart evaluated.      Assessments done today:  Stoma, ingrid-stomal skin and  pouching system     Education:  No change to current system.                            Continue to apply ring to pouch           Assessment:     Stoma assessment: stoma viable and functioning    Ingrid-stomal denudement: improved with use of lomotil and change in barrier        Effectiveness of current pouching/ supply plan: No change to current system.          Plan:     Plan:          -Continue jareth light convex premier cut to fit with tape, filter, window and lock n roll          -Continue thin brava ring to ingrid-stomal skin         -Treat ingrid-stomal skin with powder/prep. Repeat as needed         -Brava strips to outside edge of pouching system prn             "    F/U in 1 - 2  weeks for follow-up or as needed for ?/concerns          Continue RX of Lomotil for post-chemo increase in liquid output.        Resume drinking sports drinks following chemo    Face time:  15 minutes  Edu: 30 min

## 2017-07-24 NOTE — ADDENDUM NOTE
Encounter addended by: Apolonia Kidd RN on: 7/24/2017  3:47 PM<BR>     Actions taken: Sign clinical note

## 2017-08-03 ENCOUNTER — HOSPITAL ENCOUNTER (OUTPATIENT)
Dept: WOUND CARE | Facility: CLINIC | Age: 42
Discharge: HOME | End: 2017-08-03
Attending: COLON & RECTAL SURGERY | Admitting: COLON & RECTAL SURGERY
Payer: COMMERCIAL

## 2017-08-03 PROCEDURE — 99211 OFF/OP EST MAY X REQ PHY/QHP: CPT

## 2017-08-03 NOTE — PROGRESS NOTES
[facility] Nurse Outpatient Ostomy Assessment      Initial Assessment of ostomy and needs for:  Diverting loop ileostomy      Data:   History:   Per MD note(s):     robotic assisted LAR with DLI for cT3N1 rectal adenocarcinoma    5-12-17: DR Adkins referred pt for prosthetic refitting. [facility].  Pt has completed one round of chemo.  Scheduled for takedown surgery mid-September.  Reports difficult                 Pouching issues since leaving hospital with terrible leakage.  Current pouch working the best and has been on 4 day. Pt returned to work this week. Mom is present and assists with appliance changes.    5-25-17:  RTC for ileostomy pouch refitting. Continues with occasional leakage.  C/o burning and bleeding in ingrid-stomal skin. Pt taking 2 Immodium 3x/day with varied results.  Every other week chemo effect output with increase in liquid output.  Likes the Coloplast light convex cut to fit EMMANUELLE with brava ring.  Pt is placing ring on skin vs back of barrier prior to placement.     6-8-17: RTC for ileostomy pouch refitting. Leakage following chemo secondary to higher outputs. C/O burning and bleeding in ingrid-stomal skin.   ContinuesColoplast light convex cut to fit EMMANUELLE with brava ring.    6-22-17:  RTC for ileostomy pouch refitting.  Pt was given trial of Jareth Permier Light convex cut to fit, transparent and lock n roll closure.  Pt states skin dramatically improved with use of this appliance with ring , increase in Lomotil crusting.  Increase to 2 day wear time.     7-6-17: RTC for ileostomy pouch refitting. Pt continues to use Jareth Premier Light convex cut to fit with lock n roll closure.  Pt continues to experience ingrid-stomal skin irritation /dendudement but less with new appliance and Lomotil.  Continues to change appliance every day to QOD.  3 x chemo tx remaining.     7-21-17:  RTC for ileostomy pouch refitting. Pouching system:  "Jareth Premier Light convex cut to fit with lock n roll closure and ring.  Wear time: 2-3 days. Continuing to experience ingrid-stomal irritation/denudement but less with new appliance and Lomotil.  2 x chemo tx remaining.     8-3-17:  RTC for ileostomy pouch refitting. Pouching system: Jareth Premier Light convex cut to fit with lock n roll closure and ring.  Wear time: 2-3 days. Continuing to experience ingrid-stomal irritation/denudement. Pt reports increase in skin bleeding and pain since last chemo.  Pt has 1 chemo tx left and is experiencing anticipatory  anxiety      Type of ostomy: New Diverting Loop ileostomy    Stoma assessment:   ? Stoma  Slightly oval 1 1/8\" x 1\" , red,moist, flush, central functional lumen. Functional superior lumen  Mucocutaneous Junction (MCJ):Healed but lumen now sitting below skin level.   Peristomal skin:   new concentric ,3cm ring of erythema/denudement with bleeding          Now small amt bleeding denudement from 4-7 o'clock at edge of MCJ with small amt hyperplasia          Remainder ingrid-stomal skin intact without erythema.   Abdominal assessment:  Rounded and frim. When sitting stoma retracts into deep crease with abd folds superior and distal to stoma    Output: small amt green mushy fecal output. Pt took Imodium prior to arrival  Current pouching system:  Jareth Premier cut to fit convex light , peek a steward (Cut off fabric)  lock n roll and filter. Added ring today    ? Pain:  Minimal tenderness today    Diet:       Active Diet Order    Low residue          Intervention:     Patient's chart evaluated.      Assessments done today:  Stoma, ingrid-stomal skin and  pouching system     Ingrid-stomal hyperplasia silver nitrated. Crusted with powder/prep.     Education:  No change to current system.                            Continue to apply ring to pouch           Assessment:     Stoma assessment: stoma viable and functioning    Ingrid-stomal denudement: improved with use of " lomotil and change in barrier        Effectiveness of current pouching/ supply plan: No change to current system.          Plan:     Plan:          -Continue josé light convex premier cut to fit with tape, filter, window and lock n roll          -Continue thin brava ring to ingrid-stomal skin         -Treat ingrid-stomal skin with powder/prep. Repeat as needed         -Brava strips to outside edge of pouching system prn                F/U in 1 - 2  weeks for follow-up or as needed for ?/concerns          Continue RX of Lomotil for post-chemo increase in liquid output.        Resume drinking sports drinks following chemo        Check with oncologist for anxiety med to deal with anticipatory anxiety    Face time:  15 minutes  Edu: 30 min

## 2017-08-07 ENCOUNTER — TRANSFERRED RECORDS (OUTPATIENT)
Dept: HEALTH INFORMATION MANAGEMENT | Facility: CLINIC | Age: 42
End: 2017-08-07

## 2017-08-10 ENCOUNTER — CARE COORDINATION (OUTPATIENT)
Dept: CARE COORDINATION | Facility: CLINIC | Age: 42
End: 2017-08-10

## 2017-08-10 NOTE — PROGRESS NOTES
Clinic Care Coordination Contact     Situation: Patient chart reviewed by care coordinator.     Background: Referral Source: CTS  Reason for Contact: Reason for Contact: Reason for Contact: Hospitalization 4/5-4/10/2017-Rectal Cancer   Procedure(s): Robotic assisted low anterior resection with laparoscopic assisted diverting loop ileostomy            Assessment: Continues to follow Oncology closely      Plan/Recommendations: CC will follow up after Takedown surgical procedure in September      Kandis Piper RN / Clinical Care Coordinator      [address] [address]  [email or web address]  Office :  [phone] / Fax :  [phone]

## 2017-08-14 ENCOUNTER — HOSPITAL ENCOUNTER (OUTPATIENT)
Facility: CLINIC | Age: 42
Discharge: HOME | End: 2017-08-14
Attending: COLON & RECTAL SURGERY | Admitting: COLON & RECTAL SURGERY
Payer: COMMERCIAL

## 2017-08-14 ENCOUNTER — SURGERY (OUTPATIENT)
Age: 42
End: 2017-08-14

## 2017-08-14 VITALS
BODY MASS INDEX: 40.18 KG/M2 | HEIGHT: 66 IN | RESPIRATION RATE: 8 BRPM | DIASTOLIC BLOOD PRESSURE: 90 MMHG | SYSTOLIC BLOOD PRESSURE: 128 MMHG | WEIGHT: 250 LBS | OXYGEN SATURATION: 100 %

## 2017-08-14 LAB — FLEXIBLE SIGMOIDOSCOPY: NORMAL

## 2017-08-14 PROCEDURE — G0104 CA SCREEN;FLEXI SIGMOIDSCOPE: HCPCS | Performed by: COLON & RECTAL SURGERY

## 2017-08-14 PROCEDURE — 45378 DIAGNOSTIC COLONOSCOPY: CPT | Performed by: COLON & RECTAL SURGERY

## 2017-08-14 RX ORDER — NALOXONE HYDROCHLORIDE 0.4 MG/ML
.1-.4 INJECTION, SOLUTION INTRAMUSCULAR; INTRAVENOUS; SUBCUTANEOUS
Status: DISCONTINUED | OUTPATIENT
Start: 2017-08-14 | End: 2017-08-14 | Stop reason: HOSPADM

## 2017-08-14 RX ORDER — ONDANSETRON 4 MG/1
4 TABLET, ORALLY DISINTEGRATING ORAL EVERY 6 HOURS PRN
Status: DISCONTINUED | OUTPATIENT
Start: 2017-08-14 | End: 2017-08-14 | Stop reason: HOSPADM

## 2017-08-14 RX ORDER — DIPHENHYDRAMINE HCL 25 MG
25 CAPSULE ORAL EVERY 4 HOURS PRN
Status: DISCONTINUED | OUTPATIENT
Start: 2017-08-14 | End: 2017-08-14 | Stop reason: HOSPADM

## 2017-08-14 RX ORDER — DIPHENOXYLATE HCL/ATROPINE 2.5-.025MG
1 TABLET ORAL 4 TIMES DAILY PRN
Status: ON HOLD | COMMUNITY
End: 2017-09-06

## 2017-08-14 RX ORDER — PROCHLORPERAZINE MALEATE 5 MG
5-10 TABLET ORAL EVERY 6 HOURS PRN
Status: DISCONTINUED | OUTPATIENT
Start: 2017-08-14 | End: 2017-08-14 | Stop reason: HOSPADM

## 2017-08-14 RX ORDER — ONDANSETRON 2 MG/ML
4 INJECTION INTRAMUSCULAR; INTRAVENOUS EVERY 6 HOURS PRN
Status: DISCONTINUED | OUTPATIENT
Start: 2017-08-14 | End: 2017-08-14 | Stop reason: HOSPADM

## 2017-08-14 RX ORDER — ONDANSETRON 2 MG/ML
4 INJECTION INTRAMUSCULAR; INTRAVENOUS
Status: DISCONTINUED | OUTPATIENT
Start: 2017-08-14 | End: 2017-08-14 | Stop reason: HOSPADM

## 2017-08-14 RX ORDER — LIDOCAINE 40 MG/G
CREAM TOPICAL
Status: DISCONTINUED | OUTPATIENT
Start: 2017-08-14 | End: 2017-08-14 | Stop reason: HOSPADM

## 2017-08-14 RX ORDER — DIPHENHYDRAMINE HYDROCHLORIDE 50 MG/ML
25 INJECTION INTRAMUSCULAR; INTRAVENOUS EVERY 4 HOURS PRN
Status: DISCONTINUED | OUTPATIENT
Start: 2017-08-14 | End: 2017-08-14 | Stop reason: HOSPADM

## 2017-08-14 RX ORDER — FLUMAZENIL 0.1 MG/ML
0.2 INJECTION, SOLUTION INTRAVENOUS
Status: DISCONTINUED | OUTPATIENT
Start: 2017-08-14 | End: 2017-08-14 | Stop reason: HOSPADM

## 2017-08-17 ENCOUNTER — HOSPITAL ENCOUNTER (OUTPATIENT)
Dept: WOUND CARE | Facility: CLINIC | Age: 42
Discharge: HOME | End: 2017-08-17
Attending: COLON & RECTAL SURGERY | Admitting: COLON & RECTAL SURGERY
Payer: COMMERCIAL

## 2017-08-17 PROCEDURE — 99211 OFF/OP EST MAY X REQ PHY/QHP: CPT

## 2017-08-17 PROCEDURE — 17250 CHEM CAUT OF GRANLTJ TISSUE: CPT

## 2017-08-17 NOTE — PROGRESS NOTES
[facility] Nurse Outpatient Ostomy Assessment      Initial Assessment of ostomy and needs for:  Diverting loop ileostomy      Data:   History:   Per MD note(s):     robotic assisted LAR with DLI for cT3N1 rectal adenocarcinoma    5-12-17: DR Adkins referred pt for prosthetic refitting. [facility]  Pt has completed one round of chemo.  Scheduled for takedown surgery mid-September.  Reports difficult                 Pouching issues since leaving hospital with terrible leakage.  Current pouch working the best and has been on 4 day. Pt returned to work this week. Mom is present and assists with appliance changes.    5-25-17:  RTC for ileostomy pouch refitting. Continues with occasional leakage.  C/o burning and bleeding in ingrid-stomal skin. Pt taking 2 Immodium 3x/day with varied results.  Every other week chemo effect output with increase in liquid output.  Likes the Coloplast light convex cut to fit EMMANUELLE with brava ring.  Pt is placing ring on skin vs back of barrier prior to placement.     6-8-17: RTC for ileostomy pouch refitting. Leakage following chemo secondary to higher outputs. C/O burning and bleeding in ingrid-stomal skin.   ContinuesColoplast light convex cut to fit EMMANUELLE with brava ring.    6-22-17:  RTC for ileostomy pouch refitting.  Pt was given trial of Jareth Permier Light convex cut to fit, transparent and lock n roll closure.  Pt states skin dramatically improved with use of this appliance with ring , increase in Lomotil crusting.  Increase to 2 day wear time.     7-6-17: RTC for ileostomy pouch refitting. Pt continues to use Jareth Premier Light convex cut to fit with lock n roll closure.  Pt continues to experience ingrid-stomal skin irritation /dendudement but less with new appliance and Lomotil.  Continues to change appliance every day to QOD.  3 x chemo tx remaining.     7-21-17:  RTC for ileostomy pouch refitting. Pouching system:  "Jareth Premier Light convex cut to fit with lock n roll closure and ring.  Wear time: 2-3 days. Continuing to experience ingrid-stomal irritation/denudement but less with new appliance and Lomotil.  2 x chemo tx remaining.     8-3-17:  RTC for ileostomy pouch refitting. Pouching system: Jareth Premier Light convex cut to fit with lock n roll closure and ring.  Wear time: 2-3 days. Continuing to experience ingrid-stomal irritation/denudement. Pt reports increase in skin bleeding and pain since last chemo.  Pt has 1 chemo tx left and is experiencing anticipatory  Anxiety    8-17-17:  RTC for ileostomy pouch refitting. Pouching system: [place] Premier Light convex cut to fit with lock n roll closure and ring.  Wear time: 2-3 days. Continuing to experience ingrid-stomal irritation/denudement. Pt reports increase in skin bleeding and pain since last chemo. Chemo completed and pt has been cleared for surgery on early September for takddown of ileostomy with Dr. Adkins      Type of ostomy: New Diverting Loop ileostomy    Stoma assessment:   ? Stoma  Slightly oval 1 1/8\" x 1\" , red,moist, flush, central functional lumen. Functional superior lumen  Mucocutaneous Junction (MCJ):Healed but lumen now sitting below skin level.   Peristomal skin:  Concentric ,3cm ring of erythema/denudement with small amt bleeding  bleeding         Small amt hyperplasia  4-7 o'clock at edge of MCJ          Remainder ingrid-stomal skin intact without erythema.   Abdominal assessment:  Rounded and frim. When sitting stoma retracts into deep crease with abd folds superior and distal to stoma    Output: small amt green mushy fecal output. Pt took Imodium prior to arrival  Current pouching system:  [place] Premier cut to fit convex light , peek a steward (Cut off fabric)  lock n roll and filter. Continues ring today    ? Pain:  Minimal tenderness today    Diet:       Active Diet Order    Low residue          Intervention:     Patient's chart evaluated. "      Assessments done today:  Stoma, ingrid-stomal skin and  pouching system     Ingrid-stomal hyperplasia silver nitrated. Crusted with powder/prep.     Education:  No change to current system.                            Continue to apply ring to pouch           Assessment:     Stoma assessment: stoma viable and functioning    Ingrid-stomal denudement: improved with use of lomotil and change in barrier        Effectiveness of current pouching/ supply plan: No change to current system.          Plan:     Plan:          -Continue josé light convex premier cut to fit with tape, filter, window and lock n roll          -Continue thin brava ring to ingrid-stomal skin         -Treat ingrid-stomal skin with powder/prep. Repeat as needed         -Brava strips to outside edge of pouching system prn                F/U in 1 - 2/weekls for follow-up or as needed for ?/concerns          Continue RX of Lomotil for post-chemo increase in liquid output.          Face time:  15 minutes  Edu: 15 min

## 2017-08-21 ENCOUNTER — OFFICE VISIT (OUTPATIENT)
Dept: INTERNAL MEDICINE | Facility: CLINIC | Age: 42
End: 2017-08-21
Payer: COMMERCIAL

## 2017-08-21 VITALS
OXYGEN SATURATION: 98 % | WEIGHT: 254.8 LBS | SYSTOLIC BLOOD PRESSURE: 106 MMHG | HEART RATE: 94 BPM | BODY MASS INDEX: 41.13 KG/M2 | DIASTOLIC BLOOD PRESSURE: 68 MMHG

## 2017-08-21 DIAGNOSIS — C20 RECTAL CANCER (H): ICD-10-CM

## 2017-08-21 DIAGNOSIS — Z93.2 ILEOSTOMY STATUS (H): ICD-10-CM

## 2017-08-21 DIAGNOSIS — J45.20 MILD INTERMITTENT ASTHMA WITHOUT COMPLICATION: ICD-10-CM

## 2017-08-21 DIAGNOSIS — Z01.818 PREOP GENERAL PHYSICAL EXAM: Primary | ICD-10-CM

## 2017-08-21 DIAGNOSIS — F33.0 MAJOR DEPRESSIVE DISORDER, RECURRENT EPISODE, MILD (H): ICD-10-CM

## 2017-08-21 DIAGNOSIS — E03.9 ACQUIRED HYPOTHYROIDISM: ICD-10-CM

## 2017-08-21 DIAGNOSIS — M79.662 PAIN OF LEFT LOWER LEG: ICD-10-CM

## 2017-08-21 DIAGNOSIS — E66.01 MORBID OBESITY DUE TO EXCESS CALORIES (H): ICD-10-CM

## 2017-08-21 LAB
GLUCOSE SERPL-MCNC: 96 MG/DL (ref 70–99)
HGB BLD-MCNC: 10 G/DL (ref 11.7–15.7)
PLATELET # BLD AUTO: 152 10E9/L (ref 150–450)
POTASSIUM SERPL-SCNC: 4 MMOL/L (ref 3.4–5.3)
T4 FREE SERPL-MCNC: 1.13 NG/DL (ref 0.76–1.46)
TSH SERPL DL<=0.005 MIU/L-ACNC: 18.14 MU/L (ref 0.4–4)

## 2017-08-21 PROCEDURE — 36415 COLL VENOUS BLD VENIPUNCTURE: CPT | Performed by: INTERNAL MEDICINE

## 2017-08-21 PROCEDURE — 99215 OFFICE O/P EST HI 40 MIN: CPT | Performed by: INTERNAL MEDICINE

## 2017-08-21 PROCEDURE — 84132 ASSAY OF SERUM POTASSIUM: CPT | Performed by: INTERNAL MEDICINE

## 2017-08-21 PROCEDURE — 84439 ASSAY OF FREE THYROXINE: CPT | Performed by: INTERNAL MEDICINE

## 2017-08-21 PROCEDURE — 82947 ASSAY GLUCOSE BLOOD QUANT: CPT | Performed by: INTERNAL MEDICINE

## 2017-08-21 PROCEDURE — 85049 AUTOMATED PLATELET COUNT: CPT | Performed by: INTERNAL MEDICINE

## 2017-08-21 PROCEDURE — 84443 ASSAY THYROID STIM HORMONE: CPT | Performed by: INTERNAL MEDICINE

## 2017-08-21 PROCEDURE — 85018 HEMOGLOBIN: CPT | Performed by: INTERNAL MEDICINE

## 2017-08-21 NOTE — NURSING NOTE
"Chief Complaint   Patient presents with     Pre-Op Exam     Back Pain     C/o Left Lower leg sciatica x weeks        Initial /68  Pulse 114  Wt 254 lb 12.8 oz (115.6 kg)  SpO2 98%  BMI 41.13 kg/m2 Estimated body mass index is 41.13 kg/(m^2) as calculated from the following:    Height as of 8/14/17: 5' 6\" (1.676 m).    Weight as of this encounter: 254 lb 12.8 oz (115.6 kg).  Medication Reconciliation: complete   Estefany Erickson CMA      "

## 2017-08-21 NOTE — PROGRESS NOTES
[facility]  [address] [address]  [phone]  Dept: [phone]    PRE-OP EVALUATION:  Today's date: 2017    Yahaira Ramirez (: 1975) presents for pre-operative evaluation assessment as requested by Dr. Adkins.  She requires evaluation and anesthesia risk assessment prior to undergoing surgery/procedure for treatment of rectal cancer.  Proposed procedure: Open ileostomy reversal     Date of Surgery/ Procedure: 17  Time of Surgery/ Procedure: 11:30 am  Hospital/Surgical Facility: [facility]  Primary Physician: Guille Torre  Type of Anesthesia Anticipated: General    Patient has a Health Care Directive or Living Will:  NO    HPI:                                                      Brief HPI related to upcoming procedure: treatment of rectal cancer.  Proposed procedure: Open ileostomy reversal     Yahaira Ramirez is a 42 year old female here for preoperative assessment for ileostomy takedown:  treatment of rectal cancer.  Proposed procedure: Open ileostomy reversal     See problem list for active medical problems.  Problems all longstanding and stable, except as noted/documented.  See ROS for pertinent symptoms related to these conditions.                                                                                                  .    MEDICAL HISTORY:                                                      Patient Active Problem List    Diagnosis Date Noted     CARDIOVASCULAR SCREENING; LDL GOAL LESS THAN 160 10/31/2010     Priority: High     Morbid obesity due to excess calories (H) 2017     Priority: Medium     Rectal cancer (H) 2017     Priority: Medium     Mirena inserted : due for removal  or 2016     Priority: Medium     Lot # [id number]  Exp : 2018  NDC: 03771-692-01  Virginia Friedman CMA         Acquired hypothyroidism 2016     Priority: Medium     Mild intermittent asthma without complication 2016      Priority: Medium     Major depressive disorder, recurrent episode, mild (H) 03/07/2016     Priority: Medium     Hypertrophy of breast 08/01/2006     Priority: Medium      Past Medical History:   Diagnosis Date     ASCUS on Pap smear 1/23/03    + HPV     Asthma      ASTHMA - MILD INTERMITTENT 8/23/2005     Calculus of gallbladder without mention of cholecystitis or obstruction      CARDIOVASCULAR SCREENING; LDL GOAL LESS THAN 160 10/31/2010     Colon cancer (H)     chemo and radiation therapy started january 9 until february 11, 2017     Depressive disorder 2/22/2011     History of colposcopy with cervical biopsy 2/20/03, 7/23/03    SIMONE I & III, SIMONE I     HUMAN PAPILLOMAVIRUS NOS 3/24/2003    61-low risk     HYPOTHYROIDISM NOS 9/10/2002     Mild major depression (H) 2/22/2011     Other postablative hypothyroidism     iatrogenically induced hypothyroidism after I-31 for hyperthyroidism   abstracted [id number]     Rectal cancer (H) 4/5/2017     Renal stones     HX of     Past Surgical History:   Procedure Laterality Date     ABDOMEN SURGERY  4/5/17    Colon resection     C IUD,MIRENA  4/2016     CHOLECYSTECTOMY       COLONOSCOPY Left 12/9/2016    Procedure: COMBINED COLONOSCOPY, SINGLE OR MULTIPLE BIOPSY/POLYPECTOMY BY BIOPSY;  Surgeon: Claudette De La Paz MD;  Location:  GI     COLONOSCOPY N/A 8/14/2017    Procedure: COLONOSCOPY;  COLONOSCOPY ;  Surgeon: Yair Adkins MD;  Location:  GI     DAVINCI COLECTOMY N/A 4/5/2017    Procedure: DAVINCI XI COLECTOMY;  Surgeon: Yair Adkins MD;  Location:  OR     ENT SURGERY  03/2016    Tonsilectomy     EXCISE MASS UPPER EXTREMITY  12/21/2012    Procedure: EXCISE MASS UPPER EXTREMITY;  Excision Subcutaneous Mass Right Tricep;  Surgeon: Mehul Maldonado MD;  Location:  OR     EYE SURGERY      Lasik     HC CAUTERY OF CERVIX, CRYOCAUTERY  3/23/03     MAMMOPLASTY REDUCTION BILATERAL  7/3/2014    Procedure: MAMMOPLASTY REDUCTION BILATERAL;  Surgeon: Eliot  Yoanna Pierce MD;  Location: [facility]     SIGMOIDOSCOPY FLEXIBLE N/A 3/21/2017    Procedure: SIGMOIDOSCOPY FLEXIBLE;  Surgeon: Yair Adkins MD;  Location:  GI     Current Outpatient Prescriptions   Medication Sig Dispense Refill     diphenoxylate-atropine (LOMOTIL) 2.5-0.025 MG per tablet Take 1 tablet by mouth 4 times daily as needed for diarrhea       levothyroxine (SYNTHROID/LEVOTHROID) 125 MCG tablet TAKE 1 TABLET (125 MCG) BY MOUTH DAILY THEN ON DAY 7 TAKE 1 1/2 TABLETS. 45 tablet 3     order for DME 1.) Frederick ostomy bags #8578 #60  2.) Barrier strips elastic brava 5.5 C-shape #80  3.) Barrier Ring Brava Modable 48 mm soumya, 2 mm (thin) #60  4.) Sponge 4 x 4 ply non-sterile dermacea non-woven 200/pk- 6 pk.     Please1 fax to [employer] fax# [phone] 1 Units 0     fluticasone (FLONASE) 50 MCG/ACT spray SPRAY 2 SPRAYS INTO BOTH NOSTRILS DAILY 16 mL 10     mometasone-formoterol (DULERA) 100-5 MCG/ACT oral inhaler Inhale 2 puffs into the lungs 2 times daily 1 Inhaler 11     citalopram (CELEXA) 40 MG tablet Take 1 tablet (40 mg) by mouth daily 90 tablet 3     buPROPion (WELLBUTRIN SR) 150 MG 12 hr tablet Take 1 tablet (150 mg) by mouth 2 times daily 180 tablet 3     Levothyroxine Sodium (SYNTHROID PO) Take 62.5 mcg by mouth once a week (Sundays) 0.5 x 125 mcg = 62.5 mcg (Patient takes this with her regular daily dose of 125 mcg = 187.5 mcg on Sundays)       IBUPROFEN PO Take 800 mg by mouth daily as needed for moderate pain (Headaches)       Acetaminophen (TYLENOL PO) Take 1,000 mg by mouth daily as needed for mild pain (Headaches)       DiphenhydrAMINE HCl (BENADRYL PO) Take 50 mg by mouth At Bedtime (2 x 25 mg tablet = 50 mg dose)       [DISCONTINUED] Levothyroxine Sodium (SYNTHROID PO) Take 125 mcg by mouth daily       levalbuterol (XOPENEX HFA) 45 MCG/ACT inhaler Inhale 1-2 puffs into the lungs every 4 hours as needed for shortness of breath / dyspnea. 1 Inhaler 3     OTC products: None,  except as noted above    Allergies   Allergen Reactions     Cats      Pollen Extract      Sulfa Drugs Hives     Adhesive Tape Itching and Rash      Latex Allergy: YES: Precautions to take: adhesive tape    Social History   Substance Use Topics     Smoking status: Former Smoker     Packs/day: 1.00     Years: 13.00     Types: Cigarettes     Quit date: 12/7/2006     Smokeless tobacco: Never Used     Alcohol use No     History   Drug Use No       REVIEW OF SYSTEMS:                                                    C: NEGATIVE for fever, chills, change in weight  E/M: NEGATIVE for ear, mouth and throat problems  R: NEGATIVE for significant cough or SOB  CV: NEGATIVE for chest pain, palpitations or peripheral edema  GI: NEGATIVE for nausea, heartburn, or change in bowel habits  : NEGATIVE for frequency, dysuria, or hematuria  M: NEGATIVE for significant arthralgias or myalgia  H: NEGATIVE for bleeding problems  P: NEGATIVE for changes in mood or affect    EXAM:                                                    /68  Pulse 94  Wt 254 lb 12.8 oz (115.6 kg)  SpO2 98%  BMI 41.13 kg/m2    GENERAL APPEARANCE: healthy, alert and no distress     EYES: EOMI, PERRL     HENT: ear canals and TM's normal and nose and mouth without ulcers or lesions     NECK: no adenopathy, no asymmetry, masses, or scars and thyroid normal to palpation     RESP: lungs clear to auscultation - no rales, rhonchi or wheezes     CV: regular rates and rhythm, normal S1 S2, no S3 or S4 and no murmur, click or rub     ABDOMEN:  soft, obese, nontender, no HSM or masses and bowel sounds normal, right lower quadrant ostomy.     MS: extremities normal- no gross deformities noted, no evidence of inflammation in joints, FROM in all extremities.     NEURO: No focal changes less subjective changes to LLE with positioning     PSYCH: mentation appears normal and affect normal/bright    DIAGNOSTICS:                                                    EKG: Not  indicated due to non-vascular surgery and low risk of event (age <65 and without cardiac risk factors)    Recent Labs   Lab Test  04/18/17   1629  04/08/17   0720  04/07/17   0705  04/06/17   0703   03/15/17   1624  01/02/17   0827   HGB  10.7*   --   9.3*  10.3*   --   11.7  12.1   PLT  582*  226  201  257   < >  322  391   INR   --    --    --    --    --   0.90  0.89   NA   --    --   134  138   --    --    --    POTASSIUM   --    --   3.5  3.8   --   4.1   --    CR   --   0.63  0.70  0.73   < >   --    --    A1C   --    --    --    --    --   5.1   --     < > = values in this interval not displayed.        IMPRESSION:                                                    Reason for surgery/procedure: ileostomy takedown    The proposed surgical procedure is considered LOW risk.    REVISED CARDIAC RISK INDEX  The patient has the following serious cardiovascular risks for perioperative complications such as (MI, PE, VFib and 3  AV Block):  No serious cardiac risks  INTERPRETATION: 1 risks: Class II (low risk - 0.9% complication rate)    The patient has the following additional risks for perioperative complications:  No identified additional risks      ICD-10-CM    1. Preop general physical exam Z01.818 Potassium     Glucose     Hemoglobin     Platelet count     TSH with free T4 reflex   2. Ileostomy status (H) Z93.2    3. Rectal cancer (H) C20    4. Major depressive disorder, recurrent episode, mild (H) F33.0    5. Morbid obesity due to excess calories (H) E66.01    6. Acquired hypothyroidism E03.9 TSH with free T4 reflex   7. Mild intermittent asthma without complication J45.20    8. Pain of left lower leg M79.662        RECOMMENDATIONS:                                                      --Consult hospital rounder / IM to assist post-op medical management    Cardiovascular Risk  Performs 4 METs exercise without symptoms (Light housework (dusting, washing dishes), Climb a flight of stairs and Walk on level ground at  15 minutes per mile (4 miles/hour)) .     Pulmonary Risk  Incentive spirometry post op  Maximize Asthma treatment prn inhalers, nebs and resume routine medications     --Patient is to take all scheduled medications on the day of surgery EXCEPT for modifications listed below.    Stop all NSAIDS 7+ days prior to procedure.  Please make note of LLE pain noted after last surgery and persistent, suggest caution with prolonged positioning that could further exacerbate.    APPROVAL GIVEN to proceed with proposed procedure, without further diagnostic evaluation     Signed Electronically by: Guille Torre MD    Copy of this evaluation report is provided to requesting physician, Dr. Lucille Vicente Preop Guidelines

## 2017-08-21 NOTE — MR AVS SNAPSHOT
After Visit Summary   8/21/2017    Yahaira Ramirez    MRN: [id number]           Patient Information     Date Of Birth          1975        Visit Information        Provider Department      8/21/2017 11:40 AM Guille Torre MD [facility]        Today's Diagnoses     Preop general physical exam    -  1    Ileostomy status (H)        Rectal cancer (H)        Major depressive disorder, recurrent episode, mild (H)        Morbid obesity due to excess calories (H)        Acquired hypothyroidism        Mild intermittent asthma without complication        Pain of left lower leg          Care Instructions      Before Your Surgery      Call your surgeon if there is any change in your health. This includes signs of a cold or flu (such as a sore throat, runny nose, cough, rash or fever).    Do not smoke, drink alcohol or take over the counter medicine (unless your surgeon or primary care doctor tells you to) for the 24 hours before and after surgery.    If you take prescribed drugs: Follow your doctor s orders about which medicines to take and which to stop until after surgery.    Eating and drinking prior to surgery: follow the instructions from your surgeon    Take a shower or bath the night before surgery. Use the soap your surgeon gave you to gently clean your skin. If you do not have soap from your surgeon, use your regular soap. Do not shave or scrub the surgery site.  Wear clean pajamas and have clean sheets on your bed.           Follow-ups after your visit        Follow-up notes from your care team     Return if symptoms worsen or fail to improve.      Your next 10 appointments already scheduled     Sep 06, 2017   Procedure with Yair Adkins MD   [facility] (--)    [address]  [facility] [address]   [phone]            Oct 30, 2017 11:00 AM CDT   CT CHEST/ABDOMEN/PELVIS W CONTRAST with [id number]  ([facility])    [address] [facility] [address]  [facility] [address] [phone]           Please bring any scans or X-rays taken at other hospitals, if similar tests were done. Also bring a list of your medicines, including vitamins, minerals and over-the-counter drugs. It is safest to leave personal items at home.  Be sure to tell your doctor:   If you have any allergies.   If there s any chance you are pregnant.   If you are breastfeeding.   If you have any special needs.  You may have contrast for this exam. To prepare:   Do not eat or drink for 2 hours before your exam. If you need to take medicine, you may take it with small sips of water. (We may ask you to take liquid medicine as well.)   The day before your exam, drink extra fluids at least six 8-ounce glasses (unless your doctor tells you to restrict your fluids).  Patients over 70 or patients with diabetes or kidney problems:   If you haven t had a blood test (creatinine test) within the last 30 days, go to your clinic or Diagnostic Imaging Department for this test.  If you have diabetes:   If your kidney function is normal, continue taking your metformin (Avandamet, Glucophage, Glucovance, Metaglip) on the day of your exam.   If your kidney function is abnormal, wait 48 hours before restarting this medicine.  You will have oral contrast for this exam:   You will drink the contrast at home. Get this from your clinic or Diagnostic Imaging Department. Please follow the directions given.  Please wear loose clothing, such as a sweat suit or jogging clothes. Avoid snaps, zippers and other metal. We may ask you to undress and put on a hospital gown.  If you have any questions, please call the Imaging Department where you will have your exam.              Who to contact     If you have questions or need follow up information about today's clinic visit or your schedule please contact [facility] directly  at [phone].  Normal or non-critical lab and imaging results will be communicated to you by MyChart, letter or phone within 4 business days after the clinic has received the results. If you do not hear from us within 7 days, please contact the clinic through [email or web address] or phone. If you have a critical or abnormal lab result, we will notify you by phone as soon as possible.  Submit refill requests through [employer] or call your pharmacy and they will forward the refill request to us. Please allow 3 business days for your refill to be completed.          Additional Information About Your Visit        [employer] Information     [employer] gives you secure access to your electronic health record. If you see a primary care provider, you can also send messages to your care team and make appointments. If you have questions, please call your primary care clinic.  If you do not have a primary care provider, please call [phone] and they will assist you.        Care EveryWhere ID     This is your Care EveryWhere ID. This could be used by other organizations to access your [place] medical records  [id number]        Your Vitals Were     Pulse Pulse Oximetry BMI (Body Mass Index)             94 98% 41.13 kg/m2          Blood Pressure from Last 3 Encounters:   08/21/17 106/68   08/14/17 128/90   04/18/17 108/68    Weight from Last 3 Encounters:   08/21/17 254 lb 12.8 oz (115.6 kg)   08/14/17 250 lb (113.4 kg)   04/18/17 264 lb 9.6 oz (120 kg)              We Performed the Following     Glucose     Hemoglobin     Platelet count     Potassium     TSH with free T4 reflex        Primary Care Provider Office Phone # Fax #    Giulle Torre [phone]       [address] [facility] [address]        Equal Access to Services     ESTHER CHAVARRIA : Taina Colon, waelizabet angulo, qabeatriz guardado, lan unger. So [facility] [phone].    ATENCIÓN: Binh varela,  tiene a strong disposición servicios gratuitos de asistencia lingüística. Sherie riggins [phone].    We comply with applicable federal civil rights laws and [place] laws. We do not discriminate on the basis of race, color, national origin, age, disability sex, sexual orientation or gender identity.            Thank you!     Thank you for choosing [facility]  for your care. Our goal is always to provide you with excellent care. Hearing back from our patients is one way we can continue to improve our services. Please take a few minutes to complete the written survey that you may receive in the mail after your visit with us. Thank you!             Your Updated Medication List - Protect others around you: Learn how to safely use, store and throw away your medicines at www.disposemymeds.org.          This list is accurate as of: 8/21/17 12:03 PM.  Always use your most recent med list.                   Brand Name Dispense Instructions for use Diagnosis    BENADRYL PO      Take 50 mg by mouth At Bedtime (2 x 25 mg tablet = 50 mg dose)        buPROPion 150 MG 12 hr tablet    WELLBUTRIN SR    180 tablet    Take 1 tablet (150 mg) by mouth 2 times daily    Major depressive disorder, recurrent episode, mild (H)       citalopram 40 MG tablet    celeXA    90 tablet    Take 1 tablet (40 mg) by mouth daily    Major depressive disorder, recurrent episode, mild (H)       diphenoxylate-atropine 2.5-0.025 MG per tablet    LOMOTIL     Take 1 tablet by mouth 4 times daily as needed for diarrhea        fluticasone 50 MCG/ACT spray    FLONASE    16 mL    SPRAY 2 SPRAYS INTO BOTH NOSTRILS DAILY    Acute sinusitis with symptoms > 10 days       IBUPROFEN PO      Take 800 mg by mouth daily as needed for moderate pain (Headaches)        levalbuterol 45 MCG/ACT Inhaler    XOPENEX HFA    1 Inhaler    Inhale 1-2 puffs into the lungs every 4 hours as needed for shortness of breath / dyspnea.        mometasone-formoterol 100-5  MCG/ACT oral inhaler    DULERA    1 Inhaler    Inhale 2 puffs into the lungs 2 times daily    Mild intermittent asthma without complication       order for DME     1 Units    1.) [place] ostomy bags #2278 #60 2.) Barrier strips elastic brava 5.5 C-shape #80 3.) Barrier Ring Brava Modable 48 mm soumya, 2 mm (thin) #60 4.) Sponge 4 x 4 ply non-sterile dermacea non-woven 200/pk- 6 pk.   Please1 fax to [employer] fax# [phone]    Rectal cancer (H)       * SYNTHROID PO      Take 62.5 mcg by mouth once a week (Sundays) 0.5 x 125 mcg = 62.5 mcg (Patient takes this with her regular daily dose of 125 mcg = 187.5 mcg on Sundays)        * levothyroxine 125 MCG tablet    SYNTHROID/LEVOTHROID    45 tablet    TAKE 1 TABLET (125 MCG) BY MOUTH DAILY THEN ON DAY 7 TAKE 1 1/2 TABLETS.    Hypothyroidism, unspecified type       TYLENOL PO      Take 1,000 mg by mouth daily as needed for mild pain (Headaches)        * Notice:  This list has 2 medication(s) that are the same as other medications prescribed for you. Read the directions carefully, and ask your doctor or other care provider to review them with you.

## 2017-08-21 NOTE — LETTER
[facility]  [address] [place] [address]  [phone]      8/21/2017       Yahaira Ramirez  [address] [place] [address]  [place] [address]        Dear Yahaira,      I have enclosed a copy of your most recent labs done here at the clinic and if available some of your prior labs for comparison.     Your thyroid function tests indicate you need some medication adjustment so I would call the clinic and make a follow-up appointment to discuss these changes.    Please call me if you have further questions.        Guille Torre MD

## 2017-08-22 RX ORDER — LEVOTHYROXINE SODIUM 137 UG/1
137 TABLET ORAL DAILY
Qty: 90 TABLET | Refills: 3 | Status: ON HOLD | OUTPATIENT
Start: 2017-08-22 | End: 2017-09-06

## 2017-08-31 ENCOUNTER — HOSPITAL ENCOUNTER (OUTPATIENT)
Dept: WOUND CARE | Facility: CLINIC | Age: 42
Discharge: HOME | End: 2017-08-31
Attending: COLON & RECTAL SURGERY | Admitting: COLON & RECTAL SURGERY
Payer: COMMERCIAL

## 2017-08-31 PROCEDURE — 99211 OFF/OP EST MAY X REQ PHY/QHP: CPT

## 2017-08-31 PROCEDURE — 40000901 ZZH STATISTIC WOC PT EDUCATION, 0-15 MIN

## 2017-09-01 NOTE — PROGRESS NOTES
[facility] Nurse Outpatient Ostomy Assessment      Initial Assessment of ostomy and needs for:  Diverting loop ileostomy      Data:   History:   Per MD note(s):     robotic assisted LAR with DLI for cT3N1 rectal adenocarcinoma    5-12-17: DR Adkins referred pt for prosthetic refitting. [facility].  Pt has completed one round of chemo.  Scheduled for takedown surgery mid-September.  Reports difficult                 Pouching issues since leaving hospital with terrible leakage.  Current pouch working the best and has been on 4 day. Pt returned to work this week. Mom is present and assists with appliance changes.    5-25-17:  RTC for ileostomy pouch refitting. Continues with occasional leakage.  C/o burning and bleeding in ingrid-stomal skin. Pt taking 2 Immodium 3x/day with varied results.  Every other week chemo effect output with increase in liquid output.  Likes the Coloplast light convex cut to fit EMMANUELLE with brava ring.  Pt is placing ring on skin vs back of barrier prior to placement.     6-8-17: RTC for ileostomy pouch refitting. Leakage following chemo secondary to higher outputs. C/O burning and bleeding in ingrid-stomal skin.   ContinuesColoplast light convex cut to fit EMMANUELLE with brava ring.    6-22-17:  RTC for ileostomy pouch refitting.  Pt was given trial of Jareth Permier Light convex cut to fit, transparent and lock n roll closure.  Pt states skin dramatically improved with use of this appliance with ring , increase in Lomotil crusting.  Increase to 2 day wear time.     7-6-17: RTC for ileostomy pouch refitting. Pt continues to use Jareth Premier Light convex cut to fit with lock n roll closure.  Pt continues to experience ingrid-stomal skin irritation /dendudement but less with new appliance and Lomotil.  Continues to change appliance every day to QOD.  3 x chemo tx remaining.     7-21-17:  RTC for ileostomy pouch refitting. Pouching system:  "Jareth Premier Light convex cut to fit with lock n roll closure and ring.  Wear time: 2-3 days. Continuing to experience ingrid-stomal irritation/denudement but less with new appliance and Lomotil.  2 x chemo tx remaining.     8-3-17:  RTC for ileostomy pouch refitting. Pouching system: Jareth Premier Light convex cut to fit with lock n roll closure and ring.  Wear time: 2-3 days. Continuing to experience ingrid-stomal irritation/denudement. Pt reports increase in skin bleeding and pain since last chemo.  Pt has 1 chemo tx left and is experiencing anticipatory  Anxiety    8-17-17:  RTC for ileostomy pouch refitting. Pouching system: Kathleen Premier Light convex cut to fit with lock n roll closure and ring.  Wear time: 2-3 days. Continuing to experience nigrid-stomal irritation/denudement. Pt reports increase in skin bleeding and pain since last chemo. Chemo completed and pt has been cleared for surgery on early September for takddown of ileostomy with Dr. Adkins    8-31-17:  RTC for ileostomy pouch refitting and tx of ingrid-stomal hyperplasia.  Jareth Premier Light convex cut to fit with lock n roll closure and ring. Continuing to experience ingrid-stomal irritation/denudement that has not improved with completion of Chemo!!!  Pt is scheduled for Ileostomy TD on Wed 9-6-17 with Dr. Adkins      Type of ostomy: New Diverting Loop ileostomy    Stoma assessment:   ? Stoma  Slightly oval 1 1/8\" x 1\" , red,moist, flush, central functional lumen. Functional superior lumen  Mucocutaneous Junction (MCJ):Healed but lumen now sitting below skin level.   Peristomal skin:  Concentric ,3cm ring of erythema/denudement with small amt bleeding  Bleeding. Reports very painful with stinging         Small amt hyperplasia  4-7 o'clock at edge of MCJ          Remainder ingrid-stomal skin intact without erythema.   Abdominal assessment:  Rounded and frim. When sitting stoma retracts into deep crease with abd folds superior and distal to " stoma    Output: small amt green mushy fecal output. Pt took Imodium prior to arrival  Current pouching system:  Jareth Premier cut to fit convex light , peek a steward (Cut off fabric)  lock n roll and filter. Continues ring today    ? Pain:  Very painful, bleeding skin today    Diet:       Active Diet Order    Low residue          Intervention:     Patient's chart evaluated.      Assessments done today:  Stoma, ingrid-stomal skin and  pouching system     Ingrid-stomal hyperplasia silver nitrated. Crusted with powder/prep.     Education:  No change to current system.                            Continue to apply ring to pouch           Assessment:     Stoma assessment: stoma viable and functioning    Ingrid-stomal denudement: rec continued use of Lomotil till OR next Wed        Effectiveness of current pouching/ supply plan: No change to current system.          Plan:     Plan:          -Continue jareth light convex premier cut to fit with tape, filter, window and lock n roll          -Continue thin brava ring to ingrid-stomal skin         -Treat ingrid-stomal skin with powder/prep. Repeat as needed         -Brava strips to outside edge of pouching system prn         -Discussed diet expectations and output following next surgery                F/U post-op on 9-7-17          Continue RX of Lomotil for post-chemo increase in liquid output.          Face time:  15 minutes  Edu: 15 min

## 2017-09-01 NOTE — H&P (VIEW-ONLY)
[facility]  [address] [address]  [phone]  Dept: [phone]    PRE-OP EVALUATION:  Today's date: 2017    Yahaira Ramirez (: 1975) presents for pre-operative evaluation assessment as requested by Dr. Adkins.  She requires evaluation and anesthesia risk assessment prior to undergoing surgery/procedure for treatment of rectal cancer.  Proposed procedure: Open ileostomy reversal     Date of Surgery/ Procedure: 17  Time of Surgery/ Procedure: 11:30 am  Hospital/Surgical Facility: [facility]  Primary Physician: Guille Torre  Type of Anesthesia Anticipated: General    Patient has a Health Care Directive or Living Will:  NO    HPI:                                                      Brief HPI related to upcoming procedure: treatment of rectal cancer.  Proposed procedure: Open ileostomy reversal     Yahaira Ramirez is a 42 year old female here for preoperative assessment for ileostomy takedown:  treatment of rectal cancer.  Proposed procedure: Open ileostomy reversal     See problem list for active medical problems.  Problems all longstanding and stable, except as noted/documented.  See ROS for pertinent symptoms related to these conditions.                                                                                                  .    MEDICAL HISTORY:                                                      Patient Active Problem List    Diagnosis Date Noted     CARDIOVASCULAR SCREENING; LDL GOAL LESS THAN 160 10/31/2010     Priority: High     Morbid obesity due to excess calories (H) 2017     Priority: Medium     Rectal cancer (H) 2017     Priority: Medium     Mirena inserted : due for removal  or 2016     Priority: Medium     Lot # [id number]  Exp : 2018  NDC: 38300-321-87  Virginia Friedman CMA         Acquired hypothyroidism 2016     Priority: Medium     Mild intermittent asthma without complication 2016      Priority: Medium     Major depressive disorder, recurrent episode, mild (H) 03/07/2016     Priority: Medium     Hypertrophy of breast 08/01/2006     Priority: Medium      Past Medical History:   Diagnosis Date     ASCUS on Pap smear 1/23/03    + HPV     Asthma      ASTHMA - MILD INTERMITTENT 8/23/2005     Calculus of gallbladder without mention of cholecystitis or obstruction      CARDIOVASCULAR SCREENING; LDL GOAL LESS THAN 160 10/31/2010     Colon cancer (H)     chemo and radiation therapy started january 9 until february 11, 2017     Depressive disorder 2/22/2011     History of colposcopy with cervical biopsy 2/20/03, 7/23/03    SIMONE I & III, SIMONE I     HUMAN PAPILLOMAVIRUS NOS 3/24/2003    61-low risk     HYPOTHYROIDISM NOS 9/10/2002     Mild major depression (H) 2/22/2011     Other postablative hypothyroidism     iatrogenically induced hypothyroidism after I-31 for hyperthyroidism   abstracted [id number]     Rectal cancer (H) 4/5/2017     Renal stones     HX of     Past Surgical History:   Procedure Laterality Date     ABDOMEN SURGERY  4/5/17    Colon resection     C IUD,MIRENA  4/2016     CHOLECYSTECTOMY       COLONOSCOPY Left 12/9/2016    Procedure: COMBINED COLONOSCOPY, SINGLE OR MULTIPLE BIOPSY/POLYPECTOMY BY BIOPSY;  Surgeon: Cluadette De La Paz MD;  Location:  GI     COLONOSCOPY N/A 8/14/2017    Procedure: COLONOSCOPY;  COLONOSCOPY ;  Surgeon: Yair Adkins MD;  Location:  GI     DAVINCI COLECTOMY N/A 4/5/2017    Procedure: DAVINCI XI COLECTOMY;  Surgeon: Yair Adkins MD;  Location:  OR     ENT SURGERY  03/2016    Tonsilectomy     EXCISE MASS UPPER EXTREMITY  12/21/2012    Procedure: EXCISE MASS UPPER EXTREMITY;  Excision Subcutaneous Mass Right Tricep;  Surgeon: Mehul Maldonado MD;  Location:  OR     EYE SURGERY      Lasik     HC CAUTERY OF CERVIX, CRYOCAUTERY  3/23/03     MAMMOPLASTY REDUCTION BILATERAL  7/3/2014    Procedure: MAMMOPLASTY REDUCTION BILATERAL;  Surgeon: Eliot  Yoanna Pierce MD;  Location: [facility]     SIGMOIDOSCOPY FLEXIBLE N/A 3/21/2017    Procedure: SIGMOIDOSCOPY FLEXIBLE;  Surgeon: Yair Adkins MD;  Location:  GI     Current Outpatient Prescriptions   Medication Sig Dispense Refill     diphenoxylate-atropine (LOMOTIL) 2.5-0.025 MG per tablet Take 1 tablet by mouth 4 times daily as needed for diarrhea       levothyroxine (SYNTHROID/LEVOTHROID) 125 MCG tablet TAKE 1 TABLET (125 MCG) BY MOUTH DAILY THEN ON DAY 7 TAKE 1 1/2 TABLETS. 45 tablet 3     order for DME 1.) [place] ostomy bags #8578 #60  2.) Barrier strips elastic brava 5.5 C-shape #80  3.) Barrier Ring Brava Modable 48 mm soumya, 2 mm (thin) #60  4.) Sponge 4 x 4 ply non-sterile dermacea non-woven 200/pk- 6 pk.     Please1 fax to [employer] fax# [phone] 1 Units 0     fluticasone (FLONASE) 50 MCG/ACT spray SPRAY 2 SPRAYS INTO BOTH NOSTRILS DAILY 16 mL 10     mometasone-formoterol (DULERA) 100-5 MCG/ACT oral inhaler Inhale 2 puffs into the lungs 2 times daily 1 Inhaler 11     citalopram (CELEXA) 40 MG tablet Take 1 tablet (40 mg) by mouth daily 90 tablet 3     buPROPion (WELLBUTRIN SR) 150 MG 12 hr tablet Take 1 tablet (150 mg) by mouth 2 times daily 180 tablet 3     Levothyroxine Sodium (SYNTHROID PO) Take 62.5 mcg by mouth once a week (Sundays) 0.5 x 125 mcg = 62.5 mcg (Patient takes this with her regular daily dose of 125 mcg = 187.5 mcg on Sundays)       IBUPROFEN PO Take 800 mg by mouth daily as needed for moderate pain (Headaches)       Acetaminophen (TYLENOL PO) Take 1,000 mg by mouth daily as needed for mild pain (Headaches)       DiphenhydrAMINE HCl (BENADRYL PO) Take 50 mg by mouth At Bedtime (2 x 25 mg tablet = 50 mg dose)       [DISCONTINUED] Levothyroxine Sodium (SYNTHROID PO) Take 125 mcg by mouth daily       levalbuterol (XOPENEX HFA) 45 MCG/ACT inhaler Inhale 1-2 puffs into the lungs every 4 hours as needed for shortness of breath / dyspnea. 1 Inhaler 3     OTC products: None,  except as noted above    Allergies   Allergen Reactions     Cats      Pollen Extract      Sulfa Drugs Hives     Adhesive Tape Itching and Rash      Latex Allergy: YES: Precautions to take: adhesive tape    Social History   Substance Use Topics     Smoking status: Former Smoker     Packs/day: 1.00     Years: 13.00     Types: Cigarettes     Quit date: 12/7/2006     Smokeless tobacco: Never Used     Alcohol use No     History   Drug Use No       REVIEW OF SYSTEMS:                                                    C: NEGATIVE for fever, chills, change in weight  E/M: NEGATIVE for ear, mouth and throat problems  R: NEGATIVE for significant cough or SOB  CV: NEGATIVE for chest pain, palpitations or peripheral edema  GI: NEGATIVE for nausea, heartburn, or change in bowel habits  : NEGATIVE for frequency, dysuria, or hematuria  M: NEGATIVE for significant arthralgias or myalgia  H: NEGATIVE for bleeding problems  P: NEGATIVE for changes in mood or affect    EXAM:                                                    /68  Pulse 94  Wt 254 lb 12.8 oz (115.6 kg)  SpO2 98%  BMI 41.13 kg/m2    GENERAL APPEARANCE: healthy, alert and no distress     EYES: EOMI, PERRL     HENT: ear canals and TM's normal and nose and mouth without ulcers or lesions     NECK: no adenopathy, no asymmetry, masses, or scars and thyroid normal to palpation     RESP: lungs clear to auscultation - no rales, rhonchi or wheezes     CV: regular rates and rhythm, normal S1 S2, no S3 or S4 and no murmur, click or rub     ABDOMEN:  soft, obese, nontender, no HSM or masses and bowel sounds normal, right lower quadrant ostomy.     MS: extremities normal- no gross deformities noted, no evidence of inflammation in joints, FROM in all extremities.     NEURO: No focal changes less subjective changes to LLE with positioning     PSYCH: mentation appears normal and affect normal/bright    DIAGNOSTICS:                                                    EKG: Not  indicated due to non-vascular surgery and low risk of event (age <65 and without cardiac risk factors)    Recent Labs   Lab Test  04/18/17   1629  04/08/17   0720  04/07/17   0705  04/06/17   0703   03/15/17   1624  01/02/17   0827   HGB  10.7*   --   9.3*  10.3*   --   11.7  12.1   PLT  582*  226  201  257   < >  322  391   INR   --    --    --    --    --   0.90  0.89   NA   --    --   134  138   --    --    --    POTASSIUM   --    --   3.5  3.8   --   4.1   --    CR   --   0.63  0.70  0.73   < >   --    --    A1C   --    --    --    --    --   5.1   --     < > = values in this interval not displayed.        IMPRESSION:                                                    Reason for surgery/procedure: ileostomy takedown    The proposed surgical procedure is considered LOW risk.    REVISED CARDIAC RISK INDEX  The patient has the following serious cardiovascular risks for perioperative complications such as (MI, PE, VFib and 3  AV Block):  No serious cardiac risks  INTERPRETATION: 1 risks: Class II (low risk - 0.9% complication rate)    The patient has the following additional risks for perioperative complications:  No identified additional risks      ICD-10-CM    1. Preop general physical exam Z01.818 Potassium     Glucose     Hemoglobin     Platelet count     TSH with free T4 reflex   2. Ileostomy status (H) Z93.2    3. Rectal cancer (H) C20    4. Major depressive disorder, recurrent episode, mild (H) F33.0    5. Morbid obesity due to excess calories (H) E66.01    6. Acquired hypothyroidism E03.9 TSH with free T4 reflex   7. Mild intermittent asthma without complication J45.20    8. Pain of left lower leg M79.662        RECOMMENDATIONS:                                                      --Consult hospital rounder / IM to assist post-op medical management    Cardiovascular Risk  Performs 4 METs exercise without symptoms (Light housework (dusting, washing dishes), Climb a flight of stairs and Walk on level ground at  15 minutes per mile (4 miles/hour)) .     Pulmonary Risk  Incentive spirometry post op  Maximize Asthma treatment prn inhalers, nebs and resume routine medications     --Patient is to take all scheduled medications on the day of surgery EXCEPT for modifications listed below.    Stop all NSAIDS 7+ days prior to procedure.  Please make note of LLE pain noted after last surgery and persistent, suggest caution with prolonged positioning that could further exacerbate.    APPROVAL GIVEN to proceed with proposed procedure, without further diagnostic evaluation     Signed Electronically by: Guille Torre MD    Copy of this evaluation report is provided to requesting physician, Dr. Lucille Vicente Preop Guidelines

## 2017-09-06 ENCOUNTER — HOSPITAL ENCOUNTER (INPATIENT)
Facility: CLINIC | Age: 42
LOS: 2 days | Discharge: HOME | End: 2017-09-08
Attending: COLON & RECTAL SURGERY | Admitting: COLON & RECTAL SURGERY
Payer: COMMERCIAL

## 2017-09-06 ENCOUNTER — ANESTHESIA (OUTPATIENT)
Dept: SURGERY | Facility: CLINIC | Age: 42
End: 2017-09-06
Payer: COMMERCIAL

## 2017-09-06 ENCOUNTER — ANESTHESIA EVENT (OUTPATIENT)
Dept: SURGERY | Facility: CLINIC | Age: 42
End: 2017-09-06
Payer: COMMERCIAL

## 2017-09-06 ENCOUNTER — SURGERY (OUTPATIENT)
Age: 42
End: 2017-09-06

## 2017-09-06 DIAGNOSIS — Z93.2 ILEOSTOMY STATUS (H): Primary | ICD-10-CM

## 2017-09-06 LAB
CREAT SERPL-MCNC: 0.77 MG/DL (ref 0.52–1.04)
GFR SERPL CREATININE-BSD FRML MDRD: 82 ML/MIN/1.7M2
PLATELET # BLD AUTO: 193 10E9/L (ref 150–450)

## 2017-09-06 PROCEDURE — S0020 INJECTION, BUPIVICAINE HYDRO: HCPCS | Performed by: COLON & RECTAL SURGERY

## 2017-09-06 PROCEDURE — 25000125 ZZHC RX 250: Performed by: COLON & RECTAL SURGERY

## 2017-09-06 PROCEDURE — 37000009 ZZH ANESTHESIA TECHNICAL FEE, EACH ADDTL 15 MIN: Performed by: COLON & RECTAL SURGERY

## 2017-09-06 PROCEDURE — 37000008 ZZH ANESTHESIA TECHNICAL FEE, 1ST 30 MIN: Performed by: COLON & RECTAL SURGERY

## 2017-09-06 PROCEDURE — 25000128 H RX IP 250 OP 636: Performed by: ANESTHESIOLOGY

## 2017-09-06 PROCEDURE — 27210794 ZZH OR GENERAL SUPPLY STERILE: Performed by: COLON & RECTAL SURGERY

## 2017-09-06 PROCEDURE — 36415 COLL VENOUS BLD VENIPUNCTURE: CPT | Performed by: COLON & RECTAL SURGERY

## 2017-09-06 PROCEDURE — 82565 ASSAY OF CREATININE: CPT | Performed by: COLON & RECTAL SURGERY

## 2017-09-06 PROCEDURE — 25000128 H RX IP 250 OP 636: Performed by: COLON & RECTAL SURGERY

## 2017-09-06 PROCEDURE — 25000132 ZZH RX MED GY IP 250 OP 250 PS 637: Performed by: NURSE ANESTHETIST, CERTIFIED REGISTERED

## 2017-09-06 PROCEDURE — 0DBB0ZZ EXCISION OF ILEUM, OPEN APPROACH: ICD-10-PCS | Performed by: COLON & RECTAL SURGERY

## 2017-09-06 PROCEDURE — 88307 TISSUE EXAM BY PATHOLOGIST: CPT | Mod: 26 | Performed by: COLON & RECTAL SURGERY

## 2017-09-06 PROCEDURE — 88307 TISSUE EXAM BY PATHOLOGIST: CPT | Performed by: COLON & RECTAL SURGERY

## 2017-09-06 PROCEDURE — 25000125 ZZHC RX 250: Performed by: NURSE ANESTHETIST, CERTIFIED REGISTERED

## 2017-09-06 PROCEDURE — 71000013 ZZH RECOVERY PHASE 1 LEVEL 1 EA ADDTL HR: Performed by: COLON & RECTAL SURGERY

## 2017-09-06 PROCEDURE — 40000169 ZZH STATISTIC PRE-PROCEDURE ASSESSMENT I: Performed by: COLON & RECTAL SURGERY

## 2017-09-06 PROCEDURE — 71000012 ZZH RECOVERY PHASE 1 LEVEL 1 FIRST HR: Performed by: COLON & RECTAL SURGERY

## 2017-09-06 PROCEDURE — 25000128 H RX IP 250 OP 636: Performed by: NURSE ANESTHETIST, CERTIFIED REGISTERED

## 2017-09-06 PROCEDURE — 12000007 ZZH R&B INTERMEDIATE

## 2017-09-06 PROCEDURE — 25000132 ZZH RX MED GY IP 250 OP 250 PS 637: Performed by: COLON & RECTAL SURGERY

## 2017-09-06 PROCEDURE — 36000093 ZZH SURGERY LEVEL 4 1ST 30 MIN: Performed by: COLON & RECTAL SURGERY

## 2017-09-06 PROCEDURE — 25000125 ZZHC RX 250: Performed by: ANESTHESIOLOGY

## 2017-09-06 PROCEDURE — 36000063 ZZH SURGERY LEVEL 4 EA 15 ADDTL MIN: Performed by: COLON & RECTAL SURGERY

## 2017-09-06 PROCEDURE — 85049 AUTOMATED PLATELET COUNT: CPT | Performed by: COLON & RECTAL SURGERY

## 2017-09-06 PROCEDURE — 27210995 ZZH RX 272: Performed by: COLON & RECTAL SURGERY

## 2017-09-06 PROCEDURE — 25000566 ZZH SEVOFLURANE, EA 15 MIN: Performed by: COLON & RECTAL SURGERY

## 2017-09-06 RX ORDER — FENTANYL CITRATE 50 UG/ML
25-50 INJECTION, SOLUTION INTRAMUSCULAR; INTRAVENOUS
Status: DISCONTINUED | OUTPATIENT
Start: 2017-09-06 | End: 2017-09-06 | Stop reason: HOSPADM

## 2017-09-06 RX ORDER — ONDANSETRON 2 MG/ML
4 INJECTION INTRAMUSCULAR; INTRAVENOUS EVERY 30 MIN PRN
Status: DISCONTINUED | OUTPATIENT
Start: 2017-09-06 | End: 2017-09-06 | Stop reason: HOSPADM

## 2017-09-06 RX ORDER — HEPARIN SODIUM 5000 [USP'U]/.5ML
INJECTION, SOLUTION INTRAVENOUS; SUBCUTANEOUS PRN
Status: DISCONTINUED | OUTPATIENT
Start: 2017-09-06 | End: 2017-09-06

## 2017-09-06 RX ORDER — ACETAMINOPHEN 10 MG/ML
1000 INJECTION, SOLUTION INTRAVENOUS ONCE
Status: COMPLETED | OUTPATIENT
Start: 2017-09-06 | End: 2017-09-06

## 2017-09-06 RX ORDER — ALVIMOPAN 12 MG/1
12 CAPSULE ORAL 2 TIMES DAILY
Status: DISCONTINUED | OUTPATIENT
Start: 2017-09-07 | End: 2017-09-08

## 2017-09-06 RX ORDER — FENTANYL CITRATE 50 UG/ML
INJECTION, SOLUTION INTRAMUSCULAR; INTRAVENOUS PRN
Status: DISCONTINUED | OUTPATIENT
Start: 2017-09-06 | End: 2017-09-06

## 2017-09-06 RX ORDER — CITALOPRAM HYDROBROMIDE 40 MG/1
40 TABLET ORAL EVERY MORNING
Status: DISCONTINUED | OUTPATIENT
Start: 2017-09-07 | End: 2017-09-08 | Stop reason: HOSPADM

## 2017-09-06 RX ORDER — ONDANSETRON 2 MG/ML
INJECTION INTRAMUSCULAR; INTRAVENOUS PRN
Status: DISCONTINUED | OUTPATIENT
Start: 2017-09-06 | End: 2017-09-06

## 2017-09-06 RX ORDER — ACETAMINOPHEN 10 MG/ML
1000 INJECTION, SOLUTION INTRAVENOUS ONCE
Status: DISCONTINUED | OUTPATIENT
Start: 2017-09-06 | End: 2017-09-06

## 2017-09-06 RX ORDER — CIPROFLOXACIN 2 MG/ML
400 INJECTION, SOLUTION INTRAVENOUS SEE ADMIN INSTRUCTIONS
Status: DISCONTINUED | OUTPATIENT
Start: 2017-09-06 | End: 2017-09-06 | Stop reason: HOSPADM

## 2017-09-06 RX ORDER — PROPOFOL 10 MG/ML
INJECTION, EMULSION INTRAVENOUS PRN
Status: DISCONTINUED | OUTPATIENT
Start: 2017-09-06 | End: 2017-09-06

## 2017-09-06 RX ORDER — CIPROFLOXACIN 2 MG/ML
400 INJECTION, SOLUTION INTRAVENOUS
Status: COMPLETED | OUTPATIENT
Start: 2017-09-06 | End: 2017-09-06

## 2017-09-06 RX ORDER — NEOSTIGMINE METHYLSULFATE 1 MG/ML
VIAL (ML) INJECTION PRN
Status: DISCONTINUED | OUTPATIENT
Start: 2017-09-06 | End: 2017-09-06

## 2017-09-06 RX ORDER — DIPHENHYDRAMINE HYDROCHLORIDE 50 MG/ML
25 INJECTION INTRAMUSCULAR; INTRAVENOUS EVERY 6 HOURS PRN
Status: DISCONTINUED | OUTPATIENT
Start: 2017-09-06 | End: 2017-09-08 | Stop reason: HOSPADM

## 2017-09-06 RX ORDER — ALBUTEROL SULFATE 90 UG/1
AEROSOL, METERED RESPIRATORY (INHALATION) PRN
Status: DISCONTINUED | OUTPATIENT
Start: 2017-09-06 | End: 2017-09-06

## 2017-09-06 RX ORDER — ALVIMOPAN 12 MG/1
12 CAPSULE ORAL ONCE
Status: COMPLETED | OUTPATIENT
Start: 2017-09-06 | End: 2017-09-06

## 2017-09-06 RX ORDER — MAGNESIUM HYDROXIDE 1200 MG/15ML
LIQUID ORAL PRN
Status: DISCONTINUED | OUTPATIENT
Start: 2017-09-06 | End: 2017-09-06 | Stop reason: HOSPADM

## 2017-09-06 RX ORDER — SODIUM CHLORIDE, SODIUM LACTATE, POTASSIUM CHLORIDE, CALCIUM CHLORIDE 600; 310; 30; 20 MG/100ML; MG/100ML; MG/100ML; MG/100ML
INJECTION, SOLUTION INTRAVENOUS CONTINUOUS
Status: DISCONTINUED | OUTPATIENT
Start: 2017-09-06 | End: 2017-09-06 | Stop reason: HOSPADM

## 2017-09-06 RX ORDER — DEXAMETHASONE SODIUM PHOSPHATE 4 MG/ML
INJECTION, SOLUTION INTRA-ARTICULAR; INTRALESIONAL; INTRAMUSCULAR; INTRAVENOUS; SOFT TISSUE PRN
Status: DISCONTINUED | OUTPATIENT
Start: 2017-09-06 | End: 2017-09-06

## 2017-09-06 RX ORDER — CHLORHEXIDINE GLUCONATE 40 MG/ML
SOLUTION TOPICAL ONCE
Status: CANCELLED | OUTPATIENT
Start: 2017-09-06 | End: 2017-09-06

## 2017-09-06 RX ORDER — FEXOFENADINE HCL 180 MG/1
180 TABLET ORAL EVERY MORNING
Status: DISCONTINUED | OUTPATIENT
Start: 2017-09-07 | End: 2017-09-08 | Stop reason: HOSPADM

## 2017-09-06 RX ORDER — GLYCOPYRROLATE 0.2 MG/ML
INJECTION, SOLUTION INTRAMUSCULAR; INTRAVENOUS PRN
Status: DISCONTINUED | OUTPATIENT
Start: 2017-09-06 | End: 2017-09-06

## 2017-09-06 RX ORDER — ONDANSETRON 2 MG/ML
4 INJECTION INTRAMUSCULAR; INTRAVENOUS EVERY 6 HOURS PRN
Status: DISCONTINUED | OUTPATIENT
Start: 2017-09-06 | End: 2017-09-08 | Stop reason: HOSPADM

## 2017-09-06 RX ORDER — ACETAMINOPHEN 325 MG/1
975 TABLET ORAL ONCE
Status: COMPLETED | OUTPATIENT
Start: 2017-09-06 | End: 2017-09-06

## 2017-09-06 RX ORDER — LIDOCAINE HYDROCHLORIDE 20 MG/ML
INJECTION, SOLUTION INFILTRATION; PERINEURAL PRN
Status: DISCONTINUED | OUTPATIENT
Start: 2017-09-06 | End: 2017-09-06

## 2017-09-06 RX ORDER — NALOXONE HYDROCHLORIDE 0.4 MG/ML
.1-.4 INJECTION, SOLUTION INTRAMUSCULAR; INTRAVENOUS; SUBCUTANEOUS
Status: DISCONTINUED | OUTPATIENT
Start: 2017-09-06 | End: 2017-09-08 | Stop reason: HOSPADM

## 2017-09-06 RX ORDER — ONDANSETRON 4 MG/1
4 TABLET, ORALLY DISINTEGRATING ORAL EVERY 30 MIN PRN
Status: DISCONTINUED | OUTPATIENT
Start: 2017-09-06 | End: 2017-09-06 | Stop reason: HOSPADM

## 2017-09-06 RX ORDER — CIPROFLOXACIN 2 MG/ML
400 INJECTION, SOLUTION INTRAVENOUS EVERY 12 HOURS
Status: COMPLETED | OUTPATIENT
Start: 2017-09-06 | End: 2017-09-07

## 2017-09-06 RX ORDER — LIDOCAINE 40 MG/G
CREAM TOPICAL
Status: DISCONTINUED | OUTPATIENT
Start: 2017-09-06 | End: 2017-09-08 | Stop reason: HOSPADM

## 2017-09-06 RX ORDER — SODIUM CHLORIDE, SODIUM LACTATE, POTASSIUM CHLORIDE, CALCIUM CHLORIDE 600; 310; 30; 20 MG/100ML; MG/100ML; MG/100ML; MG/100ML
INJECTION, SOLUTION INTRAVENOUS CONTINUOUS
Status: DISCONTINUED | OUTPATIENT
Start: 2017-09-06 | End: 2017-09-07 | Stop reason: CLARIF

## 2017-09-06 RX ORDER — VECURONIUM BROMIDE 1 MG/ML
INJECTION, POWDER, LYOPHILIZED, FOR SOLUTION INTRAVENOUS PRN
Status: DISCONTINUED | OUTPATIENT
Start: 2017-09-06 | End: 2017-09-06

## 2017-09-06 RX ORDER — BUPROPION HYDROCHLORIDE 150 MG/1
150 TABLET, EXTENDED RELEASE ORAL 2 TIMES DAILY
Status: DISCONTINUED | OUTPATIENT
Start: 2017-09-06 | End: 2017-09-08 | Stop reason: HOSPADM

## 2017-09-06 RX ORDER — LEVALBUTEROL TARTRATE 45 UG/1
1-2 AEROSOL, METERED ORAL EVERY 4 HOURS PRN
Status: DISCONTINUED | OUTPATIENT
Start: 2017-09-06 | End: 2017-09-08 | Stop reason: HOSPADM

## 2017-09-06 RX ORDER — HYDROMORPHONE HYDROCHLORIDE 1 MG/ML
.3-.5 INJECTION, SOLUTION INTRAMUSCULAR; INTRAVENOUS; SUBCUTANEOUS EVERY 5 MIN PRN
Status: DISCONTINUED | OUTPATIENT
Start: 2017-09-06 | End: 2017-09-06 | Stop reason: HOSPADM

## 2017-09-06 RX ORDER — BUPIVACAINE HYDROCHLORIDE 5 MG/ML
INJECTION, SOLUTION PERINEURAL PRN
Status: DISCONTINUED | OUTPATIENT
Start: 2017-09-06 | End: 2017-09-06 | Stop reason: HOSPADM

## 2017-09-06 RX ADMIN — CIPROFLOXACIN 400 MG: 2 INJECTION, SOLUTION INTRAVENOUS at 22:58

## 2017-09-06 RX ADMIN — HYDROMORPHONE HYDROCHLORIDE: 10 INJECTION, SOLUTION INTRAMUSCULAR; INTRAVENOUS; SUBCUTANEOUS at 14:56

## 2017-09-06 RX ADMIN — ALVIMOPAN 12 MG: 12 CAPSULE ORAL at 10:39

## 2017-09-06 RX ADMIN — FENTANYL CITRATE 50 MCG: 50 INJECTION, SOLUTION INTRAMUSCULAR; INTRAVENOUS at 15:03

## 2017-09-06 RX ADMIN — HYDROMORPHONE HYDROCHLORIDE 0.5 MG: 1 INJECTION, SOLUTION INTRAMUSCULAR; INTRAVENOUS; SUBCUTANEOUS at 15:33

## 2017-09-06 RX ADMIN — SODIUM CHLORIDE 1000 ML: 0.9 IRRIGANT IRRIGATION at 13:45

## 2017-09-06 RX ADMIN — FENTANYL CITRATE 100 MCG: 50 INJECTION, SOLUTION INTRAMUSCULAR; INTRAVENOUS at 11:43

## 2017-09-06 RX ADMIN — ROCURONIUM BROMIDE 50 MG: 10 INJECTION INTRAVENOUS at 11:43

## 2017-09-06 RX ADMIN — ONDANSETRON 4 MG: 2 INJECTION INTRAMUSCULAR; INTRAVENOUS at 13:48

## 2017-09-06 RX ADMIN — HYDROMORPHONE HYDROCHLORIDE 0.5 MG: 1 INJECTION, SOLUTION INTRAMUSCULAR; INTRAVENOUS; SUBCUTANEOUS at 15:13

## 2017-09-06 RX ADMIN — DEXMEDETOMIDINE HYDROCHLORIDE 0.2 MCG/KG/HR: 100 INJECTION, SOLUTION INTRAVENOUS at 11:53

## 2017-09-06 RX ADMIN — ALBUTEROL SULFATE 6 PUFF: 90 AEROSOL, METERED RESPIRATORY (INHALATION) at 14:08

## 2017-09-06 RX ADMIN — MIDAZOLAM HYDROCHLORIDE 2 MG: 1 INJECTION, SOLUTION INTRAMUSCULAR; INTRAVENOUS at 11:43

## 2017-09-06 RX ADMIN — VECURONIUM BROMIDE 1 MG: 1 INJECTION, POWDER, LYOPHILIZED, FOR SOLUTION INTRAVENOUS at 12:55

## 2017-09-06 RX ADMIN — HYDROMORPHONE HYDROCHLORIDE: 10 INJECTION, SOLUTION INTRAMUSCULAR; INTRAVENOUS; SUBCUTANEOUS at 23:03

## 2017-09-06 RX ADMIN — PROPOFOL 200 MG: 10 INJECTION, EMULSION INTRAVENOUS at 11:43

## 2017-09-06 RX ADMIN — CIPROFLOXACIN 400 MG: 2 INJECTION INTRAVENOUS at 11:50

## 2017-09-06 RX ADMIN — BUPROPION HYDROCHLORIDE 150 MG: 150 TABLET, FILM COATED, EXTENDED RELEASE ORAL at 20:28

## 2017-09-06 RX ADMIN — SODIUM CHLORIDE, POTASSIUM CHLORIDE, SODIUM LACTATE AND CALCIUM CHLORIDE: 600; 310; 30; 20 INJECTION, SOLUTION INTRAVENOUS at 12:47

## 2017-09-06 RX ADMIN — NEOSTIGMINE METHYLSULFATE 5 MG: 1 INJECTION INTRAMUSCULAR; INTRAVENOUS; SUBCUTANEOUS at 14:05

## 2017-09-06 RX ADMIN — BUPIVACAINE HYDROCHLORIDE 30 ML: 5 INJECTION, SOLUTION PERINEURAL at 14:05

## 2017-09-06 RX ADMIN — SODIUM CHLORIDE, POTASSIUM CHLORIDE, SODIUM LACTATE AND CALCIUM CHLORIDE: 600; 310; 30; 20 INJECTION, SOLUTION INTRAVENOUS at 10:40

## 2017-09-06 RX ADMIN — ACETAMINOPHEN 1000 MG: 10 INJECTION, SOLUTION INTRAVENOUS at 19:39

## 2017-09-06 RX ADMIN — LIDOCAINE HYDROCHLORIDE 100 MG: 20 INJECTION, SOLUTION INFILTRATION; PERINEURAL at 11:43

## 2017-09-06 RX ADMIN — METRONIDAZOLE 500 MG: 500 INJECTION, SOLUTION INTRAVENOUS at 20:28

## 2017-09-06 RX ADMIN — VECURONIUM BROMIDE 2 MG: 1 INJECTION, POWDER, LYOPHILIZED, FOR SOLUTION INTRAVENOUS at 12:27

## 2017-09-06 RX ADMIN — HYDROMORPHONE HYDROCHLORIDE 0.5 MG: 1 INJECTION, SOLUTION INTRAMUSCULAR; INTRAVENOUS; SUBCUTANEOUS at 12:06

## 2017-09-06 RX ADMIN — SODIUM CHLORIDE, POTASSIUM CHLORIDE, SODIUM LACTATE AND CALCIUM CHLORIDE: 600; 310; 30; 20 INJECTION, SOLUTION INTRAVENOUS at 17:03

## 2017-09-06 RX ADMIN — HYDROMORPHONE HYDROCHLORIDE 0.5 MG: 1 INJECTION, SOLUTION INTRAMUSCULAR; INTRAVENOUS; SUBCUTANEOUS at 14:49

## 2017-09-06 RX ADMIN — FENTANYL CITRATE 50 MCG: 50 INJECTION, SOLUTION INTRAMUSCULAR; INTRAVENOUS at 14:49

## 2017-09-06 RX ADMIN — VECURONIUM BROMIDE 1 MG: 1 INJECTION, POWDER, LYOPHILIZED, FOR SOLUTION INTRAVENOUS at 13:15

## 2017-09-06 RX ADMIN — METRONIDAZOLE 500 MG: 500 INJECTION, SOLUTION INTRAVENOUS at 12:04

## 2017-09-06 RX ADMIN — ACETAMINOPHEN 975 MG: 325 TABLET, FILM COATED ORAL at 10:39

## 2017-09-06 RX ADMIN — HEPARIN SODIUM 5000 UNITS: 10000 INJECTION, SOLUTION INTRAVENOUS; SUBCUTANEOUS at 12:15

## 2017-09-06 RX ADMIN — HYDROMORPHONE HYDROCHLORIDE 0.5 MG: 1 INJECTION, SOLUTION INTRAMUSCULAR; INTRAVENOUS; SUBCUTANEOUS at 13:02

## 2017-09-06 RX ADMIN — GLYCOPYRROLATE 0.8 MG: 0.2 INJECTION, SOLUTION INTRAMUSCULAR; INTRAVENOUS at 14:05

## 2017-09-06 RX ADMIN — LIDOCAINE HYDROCHLORIDE 1 ML: 10 INJECTION, SOLUTION EPIDURAL; INFILTRATION; INTRACAUDAL; PERINEURAL at 10:39

## 2017-09-06 RX ADMIN — SODIUM CHLORIDE 1000 ML: 0.9 IRRIGANT IRRIGATION at 12:09

## 2017-09-06 RX ADMIN — DEXAMETHASONE SODIUM PHOSPHATE 4 MG: 4 INJECTION, SOLUTION INTRA-ARTICULAR; INTRALESIONAL; INTRAMUSCULAR; INTRAVENOUS; SOFT TISSUE at 12:20

## 2017-09-06 ASSESSMENT — ENCOUNTER SYMPTOMS: SEIZURES: 0

## 2017-09-06 NOTE — ANESTHESIA POSTPROCEDURE EVALUATION
Patient: Yahaira Ramirez    Procedure(s):  OPEN ILEOSTOMY REVERSAL  - Wound Class: II-Clean Contaminated    Diagnosis:rectal cancer  Diagnosis Additional Information: No value filed.    Anesthesia Type:  General, ETT    Note:  Anesthesia Post Evaluation    Patient location during evaluation: PACU  Patient participation: Able to fully participate in evaluation  Level of consciousness: awake  Pain management: adequate  Airway patency: patent  Cardiovascular status: acceptable  Respiratory status: acceptable  Hydration status: acceptable  PONV: none     Anesthetic complications: None          Last vitals:  Vitals:    09/06/17 1440 09/06/17 1450 09/06/17 1500   BP: 121/73 116/73 121/78   Resp: 12 8 21   Temp:      SpO2: 100% 93% 97%         Electronically Signed By: Karmen Porter MD, MD  September 6, 2017  3:09 PM

## 2017-09-06 NOTE — IP AVS SNAPSHOT
MRN:[id number]                      After Visit Summary   9/6/2017    Yahaira Ramirez    MRN: [id number]           Thank you!     Thank you for choosing [facility] for your care. Our goal is always to provide you with excellent care. Hearing back from our patients is one way we can continue to improve our services. Please take a few minutes to complete the written survey that you may receive in the mail after you visit with us. Thank you!        Patient Information     Date Of Birth          1975        About your hospital stay     You were admitted on:  September 6, 2017 You last received care in the:  [place] [address] [facility]    You were discharged on:  September 8, 2017        Reason for your hospital stay       The patient was in the hospital to have surgery to reverse her ileostomy            Reason for your hospital stay       This patient was in the hospital for surgery                  Who to Call     For medical emergencies, please call 911.  For non-urgent questions about your medical care, please call your primary care provider or clinic, [phone]  For questions related to your surgery, please call your surgery clinic        Attending Provider     Provider Specialty    Yair Adkins MD Colon and Rectal Surgery       Primary Care Provider Office Phone # Fax #    Guille Torre [phone]      After Care Instructions     Activity       Your activity upon discharge:No heavy lifting or straining over 15-20 lbs for 6 weeks. No Driving while taking narcotic pain medications            Activity       Your activity upon discharge: No heavy lifting >10-15lbs for 6 weeks.     Do not drive while taking narcotic pain medication.    Get regular activity and try to walk for a total of 30 minutes. Start slow by walking 5-10 minutes at a time, increasing each day to 30 minutes at one time. Slowly return to your regular level of activity. Rest as needed.             Diet       Follow this diet upon discharge: Continue a low residue diet for 6 weeks.            Diet       Follow this diet upon discharge: LOW FIBER DIET  A low fiber diet helps to decrease size and frequency of stools  Try foods such as:  - beef, poultry, fish, eggs, smooth peanut butter, dairy (as tolerated)  - refined or white flour products (like white bread, white pasta, etc)  Avoid nuts, seeds, raw vegetables.            Discharge Instructions       Please call the clinic if:  - fever greater than 101 degrees  - any signs of infection (increasing redness, swelling, tenderness, warmth, change in appearance, increased drainage)  - blood in urine or stool  - severe pain that is not relieved by medicine, rest, or ice    Or as questions or concerns arise. Contact clinic at [phone]    Call 101 if you feel you are having a medical emergency            Wound care and dressings       Instructions to care for your wound at home: daily dressing changes.                  Follow-up Appointments     Follow-up and recommended labs and tests        Follow up in the office in 3-4 weeks with Dr. Adkins or at your already scheduled post op visit. Call [phone] to schedule your appointment. Call the office at [phone] if you develop fever, uncontrolled pain, bleeding, nausea, vomiting, or constipation.            Follow-up and recommended labs and tests        Follow up in the office in 3-4 weeks with Dr. Adkins. Call [phone] to schedule your appointment. Call the office at [phone] if you develop fever, uncontrolled pain, bleeding, nausea, vomiting, or constipation.   Continue a low residue diet for 6 weeks. Avoid nuts, seeds, and raw vegetables.   No heavy lifting or straining over 15-20 lbs for 6 weeks. No Driving while taking narcotic pain medications                  Your next 10 appointments already scheduled     Oct 30, 2017 11:00 AM CDT   CT CHEST/ABDOMEN/PELVIS W CONTRAST with [id number]    [facility] [place] ([facility])    [address]  [facility] [address] [phone]           Please bring any scans or X-rays taken at other hospitals, if similar tests were done. Also bring a list of your medicines, including vitamins, minerals and over-the-counter drugs. It is safest to leave personal items at home.  Be sure to tell your doctor:   If you have any allergies.   If there s any chance you are pregnant.   If you are breastfeeding.   If you have any special needs.  You may have contrast for this exam. To prepare:   Do not eat or drink for 2 hours before your exam. If you need to take medicine, you may take it with small sips of water. (We may ask you to take liquid medicine as well.)   The day before your exam, drink extra fluids at least six 8-ounce glasses (unless your doctor tells you to restrict your fluids).  Patients over 70 or patients with diabetes or kidney problems:   If you haven t had a blood test (creatinine test) within the last 30 days, go to your clinic or Diagnostic Imaging Department for this test.  If you have diabetes:   If your kidney function is normal, continue taking your metformin (Avandamet, Glucophage, Glucovance, Metaglip) on the day of your exam.   If your kidney function is abnormal, wait 48 hours before restarting this medicine.  You will have oral contrast for this exam:   You will drink the contrast at home. Get this from your clinic or Diagnostic Imaging Department. Please follow the directions given.  Please wear loose clothing, such as a sweat suit or jogging clothes. Avoid snaps, zippers and other metal. We may ask you to undress and put on a hospital gown.  If you have any questions, please call the Imaging Department where you will have your exam.              Further instructions from your care team       Discharge Instructions following Abdominal Surgery  [facility]  [address]      Bowel Function  After removal of a portion of the intestines, it is normal for bowel movements to be erratic.  They are often looser and more frequent.  This condition generally resolves within a few weeks or it can be controlled with diet or medication.  Diet  In general, you may return to a well-balanced diet upon discharge.  Your doctor will let you know when to add extra fiber to your diet.  Be sure to drink plenty of fluids.  Incision  You should expect some discomfort in the area of your incision, particularly as you increase your activity.  If you notice an area of increasing redness or new drainage, please call your doctor.  You may shower as desired but refrain from tub baths or swimming until the incisions are fully healed.  Activity  Gradually increase your activity each day.  There are generally no restrictions on walking, climbing stairs, or riding in a car.  You can usually drive a car 7-10 days after your leave the hospital.  Do not drive while taking narcotic pain medications.  Ask your doctor regarding resumption of physical sexual activity; in most cases, you can resume sex after a few weeks.  Your physician will advise you about when to return to work.  It is preferable to have somebody stay with you at home until you are fully healed and able to resume a normal level of activity   Medications  You will be discharged with a prescription pain medication and any medications you were taking prior to surgery.  Do not drive while taking narcotic pain medications.  If you need additional medications or a refill, you must call your doctor during normal business hours.  It is the policy of [facility] to not refill pain medication after hours or on weekends because your chart is not available.  Follow-up  Typically, you will be asked to schedule a follow-up office visit 1 to 4 weeks after surgery.  If you have any questions related to follow-up, medications, or  "your condition, please call the office.      Call your surgeon if you have these signs or symptoms:  ([phone])    Problem with the incision, including increasing pain, swelling, redness, or drainage    Increasing abdominal pain    Uncontrolled nausea or vomiting    Fever or chills    Constipation  (no bowel movement for 3 days)    Diarrhea (more than 3 watery stools within 24 hours)    Bleeding from the rectum, wound, or stoma    Any questions or concerns      Pending Results     No orders found from 9/4/2017 to 9/7/2017.            Statement of Approval     Ordered          09/08/17 1057  I have reviewed and agree with all the recommendations and orders detailed in this document.  EFFECTIVE NOW     Approved and electronically signed by:  Catalina Gomez PA-C             Admission Information     Date & Time Provider Department Dept. Phone    9/6/2017 Yair Adkins MD [place] [address] [facility] [phone]      Your Vitals Were     Blood Pressure Pulse Temperature Respirations Height Weight    117/80 (BP Location: Left arm) 107 98.7  F (37.1  C) (Oral) 16 1.676 m (5' 6\") 117.1 kg (258 lb 1.6 oz)    Pulse Oximetry BMI (Body Mass Index)                96% 41.66 kg/m2          [facility] Information     [email or web address] gives you secure access to your electronic health record. If you see a primary care provider, you can also send messages to your care team and make appointments. If you have questions, please call your primary care clinic.  If you do not have a primary care provider, please call [phone] and they will assist you.        Care EveryWhere ID     This is your Care EveryWhere ID. This could be used by other organizations to access your [facility] medical records  [id number]        Equal Access to Services     ESTHER CHAVARRIA : zurdo Velasco, lan osorio. So [facility] [phone].    ATENCIÓN: Si habla " español, tiene a strong disposición servicios gratuitos de asistencia lingüística. Sherie riggins [phone].    We comply with applicable federal civil rights laws and [place] laws. We do not discriminate on the basis of race, color, national origin, age, disability sex, sexual orientation or gender identity.               Review of your medicines      START taking        Dose / Directions    oxyCODONE 5 MG IR tablet   Commonly known as:  ROXICODONE        Dose:  5 mg   Take 1 tablet (5 mg) by mouth every 4 hours as needed for moderate to severe pain   Quantity:  40 tablet   Refills:  0         CONTINUE these medicines which have NOT CHANGED        Dose / Directions    ALLEGRA PO        Dose:  1 tablet   Take 1 tablet by mouth every morning   Refills:  0       BENADRYL PO   Indication:  Trouble Sleeping        Dose:  50 mg   Take 50 mg by mouth At Bedtime (2 x 25 mg tablet = 50 mg dose)   Refills:  0       CELEXA PO        Dose:  40 mg   Take 40 mg by mouth every morning   Refills:  0       FLONASE NA        Dose:  2 spray   Apply 2 sprays into each nare daily as needed   Refills:  0       IBUPROFEN PO        Dose:  800 mg   Take 800 mg by mouth daily as needed for moderate pain (Headaches)   Refills:  0       levalbuterol 45 MCG/ACT Inhaler   Commonly known as:  XOPENEX HFA        Dose:  1-2 puff   Inhale 1-2 puffs into the lungs every 4 hours as needed for shortness of breath / dyspnea.   Quantity:  1 Inhaler   Refills:  3       mometasone-formoterol 100-5 MCG/ACT oral inhaler   Commonly known as:  DULERA   Used for:  Mild intermittent asthma without complication        Dose:  2 puff   Inhale 2 puffs into the lungs 2 times daily   Quantity:  1 Inhaler   Refills:  11       order for DME   Used for:  Rectal cancer (H)        1.) Jareth ostomy bags #2478 #60 2.) Barrier strips elastic brava 5.5 C-shape #80 3.) Barrier Ring Brava Modable 48 mm soumya, 2 mm (thin) #60 4.) Sponge 4 x 4 ply non-sterile dermacea non-woven  200/pk- 6 pk.   Please1 fax to [employer] fax# [phone]   Quantity:  1 Units   Refills:  0       SYNTHROID PO        Dose:  137 mcg   Take 137 mcg by mouth every morning   Refills:  0       TYLENOL PO        Dose:  1000 mg   Take 1,000 mg by mouth daily as needed for mild pain (Headaches)   Refills:  0       WELLBUTRIN SR PO        Dose:  150 mg   Take 150 mg by mouth 2 times daily   Refills:  0            Where to get your medicines      Some of these will need a paper prescription and others can be bought over the counter. Ask your nurse if you have questions.     Bring a paper prescription for each of these medications     oxyCODONE 5 MG IR tablet                Protect others around you: Learn how to safely use, store and throw away your medicines at [email or web address].             Medication List: This is a list of all your medications and when to take them. Check marks below indicate your daily home schedule. Keep this list as a reference.      Medications           Morning Afternoon Evening Bedtime As Needed    ALLEGRA PO   Take 1 tablet by mouth every morning   Last time this was given:  180 mg on 9/8/2017  8:14 AM   Next Dose Due:  Tomorrow morning 9/9                                   BENADRYL PO   Take 50 mg by mouth At Bedtime (2 x 25 mg tablet = 50 mg dose)                                   CELEXA PO   Take 40 mg by mouth every morning   Last time this was given:  40 mg on 9/8/2017  8:14 AM   Next Dose Due:  Tomorrow morning 9/9                                   FLONASE NA   Apply 2 sprays into each nare daily as needed                                   IBUPROFEN PO   Take 800 mg by mouth daily as needed for moderate pain (Headaches)                                   levalbuterol 45 MCG/ACT Inhaler   Commonly known as:  XOPENEX HFA   Inhale 1-2 puffs into the lungs every 4 hours as needed for shortness of breath / dyspnea.                                   mometasone-formoterol 100-5  MCG/ACT oral inhaler   Commonly known as:  DULERA   Inhale 2 puffs into the lungs 2 times daily   Last time this was given:  2 puffs on 9/8/2017  8:14 AM   Next Dose Due:  This evening 9/8                                      order for DME   1.) Jareth ostomy bags #8578 #60 2.) Barrier strips elastic brava 5.5 C-shape #80 3.) Barrier Ring Brava Modable 48 mm soumya, 2 mm (thin) #60 4.) Sponge 4 x 4 ply non-sterile dermacea non-woven 200/pk- 6 pk.   Please1 fax to [employer] fax# [phone]                                oxyCODONE 5 MG IR tablet   Commonly known as:  ROXICODONE   Take 1 tablet (5 mg) by mouth every 4 hours as needed for moderate to severe pain   Last time this was given:  5 mg on 9/7/2017  8:25 PM                                   SYNTHROID PO   Take 137 mcg by mouth every morning   Last time this was given:  137 mcg on 9/7/2017  6:16 AM                                   TYLENOL PO   Take 1,000 mg by mouth daily as needed for mild pain (Headaches)   Last time this was given:  650 mg on 9/8/2017 10:41 AM                                   WELLBUTRIN SR PO   Take 150 mg by mouth 2 times daily   Last time this was given:  150 mg on 9/8/2017  8:14 AM   Next Dose Due:  This evening 9/8

## 2017-09-06 NOTE — PROGRESS NOTES
Admission medication history interview status for the 9/6/2017  admission is complete. See EPIC admission navigator for prior to admission medications     Medication history source reliability:Good    Medication history interview source(s):Patient    Medication history resources (including written lists, pill bottles, clinic record):Patient brought a Med list from home on day of procedure.    Primary pharmacy.[facility] [address] [place]    Additional medication history information not noted on PTA med list :None    Time spent in this activity: 30 minutes    Prior to Admission medications    Medication Sig Last Dose Taking? Auth Provider   BuPROPion HCl (WELLBUTRIN SR PO) Take 150 mg by mouth 2 times daily 9/6/2017 at 0730 Yes Reported, Patient   Citalopram Hydrobromide (CELEXA PO) Take 40 mg by mouth every morning 9/6/2017 at 0730 Yes Reported, Patient   Fluticasone Propionate (FLONASE NA) Apply 2 sprays into each nare daily as needed Over 3 months ago at prn Yes Reported, Patient   Levothyroxine Sodium (SYNTHROID PO) Take 137 mcg by mouth every morning 9/6/2017 at 0730 Yes Reported, Patient   Fexofenadine HCl (ALLEGRA PO) Take 1 tablet by mouth every morning 9/5/2017 at am Yes Reported, Patient   mometasone-formoterol (DULERA) 100-5 MCG/ACT oral inhaler Inhale 2 puffs into the lungs 2 times daily 9/5/2017 at 2100 Yes Guille Torre MD   IBUPROFEN PO Take 800 mg by mouth daily as needed for moderate pain (Headaches) Over 1 month ago at prn Yes Reported, Patient   Acetaminophen (TYLENOL PO) Take 1,000 mg by mouth daily as needed for mild pain (Headaches) Over 3 months ago at prn Yes Reported, Patient   DiphenhydrAMINE HCl (BENADRYL PO) Take 50 mg by mouth At Bedtime (2 x 25 mg tablet = 50 mg dose)  9/4/2017 at hs Yes Reported, Patient   levalbuterol (XOPENEX HFA) 45 MCG/ACT inhaler Inhale 1-2 puffs into the lungs every 4 hours as needed for shortness of breath / dyspnea. Over 6 months ago at prn Yes Andreas Godoy,  MD   order for DME 1.) [place] ostomy bags #8578 #60  2.) Barrier strips elastic brava 5.5 C-shape #80  3.) Barrier Ring Brava Modable 48 mm soumya, 2 mm (thin) #60  4.) Sponge 4 x 4 ply non-sterile dermacea non-woven 200/pk- 6 pk.     Please1 fax to [employer] fax# [phone]   Guille Torre MD

## 2017-09-06 NOTE — ANESTHESIA PREPROCEDURE EVALUATION
Anesthesia Evaluation     .             ROS/MED HX    ENT/Pulmonary:     (+)allergic rhinitis, asthma Treatment: Inhaler daily,  , . .    Neurologic:      (-) seizures and migraines   Cardiovascular:        (-) hypertension   METS/Exercise Tolerance:  4 - Raking leaves, gardening   Hematologic:         Musculoskeletal:         GI/Hepatic:     (+) Other GI/Hepatic Rectal CA     (-) GERD and liver disease   Renal/Genitourinary:     (+) Nephrolithiasis ,    (-) renal disease   Endo:     (+) thyroid problem hypothyroidism, Obesity, .   (-) Type II DM   Psychiatric:     (+) psychiatric history depression      Infectious Disease:         Malignancy:         Other:                     Physical Exam  Normal systems: pulmonary and dental    Airway   Mallampati: II  TM distance: >3 FB  Neck ROM: full    Dental     Cardiovascular   Rhythm and rate: regular and normal      Pulmonary    breath sounds clear to auscultation                        Anesthesia Plan      History & Physical Review  History and physical reviewed and following examination; no interval change.    ASA Status:  3 .    NPO Status:  > 8 hours    Plan for General and ETT with Intravenous and Propofol induction. Maintenance will be Balanced.    PONV prophylaxis:  Ondansetron (or other 5HT-3) and Dexamethasone or Solumedrol  Additional equipment: Videolaryngoscope precedex gtt        Postoperative Care  Postoperative pain management:  IV analgesics.      Consents  Anesthetic plan, risks, benefits and alternatives discussed with:  Patient..        Procedure: Procedure(s):  TAKEDOWN ILEOSTOMY  Preop diagnosis: rectal cancer    Allergies   Allergen Reactions     Cats      Pollen Extract      Sulfa Drugs Hives     Adhesive Tape Itching and Rash     Past Medical History:   Diagnosis Date     ASCUS on Pap smear 1/23/03    + HPV     Asthma      ASTHMA - MILD INTERMITTENT 8/23/2005     Calculus of gallbladder without mention of cholecystitis or obstruction       CARDIOVASCULAR SCREENING; LDL GOAL LESS THAN 160 10/31/2010     Colon cancer (H)     chemo and radiation therapy started january 9 until february 11, 2017     Depressive disorder 2/22/2011     History of colposcopy with cervical biopsy 2/20/03, 7/23/03    SIMONE I & III, SIMONE I     HUMAN PAPILLOMAVIRUS NOS 3/24/2003    61-low risk     HYPOTHYROIDISM NOS 9/10/2002     Mild major depression (H) 2/22/2011     Other postablative hypothyroidism     iatrogenically induced hypothyroidism after I-31 for hyperthyroidism   abstracted [id number]     Rectal cancer (H) 4/5/2017     Renal stones     HX of     Past Surgical History:   Procedure Laterality Date     ABDOMEN SURGERY  4/5/17    Colon resection     C IUD,MIRENA  4/2016     CHOLECYSTECTOMY       COLONOSCOPY Left 12/9/2016    Procedure: COMBINED COLONOSCOPY, SINGLE OR MULTIPLE BIOPSY/POLYPECTOMY BY BIOPSY;  Surgeon: Claudette De La Paz MD;  Location:  GI     COLONOSCOPY N/A 8/14/2017    Procedure: COLONOSCOPY;  COLONOSCOPY ;  Surgeon: Yair Adkins MD;  Location:  GI     DAVINCI COLECTOMY N/A 4/5/2017    Procedure: DAVINCI XI COLECTOMY;  Surgeon: Yair Adkins MD;  Location: [facility]     ENT SURGERY  03/2016    Tonsilectomy     EXCISE MASS UPPER EXTREMITY  12/21/2012    Procedure: EXCISE MASS UPPER EXTREMITY;  Excision Subcutaneous Mass Right Tricep;  Surgeon: Mehul Maldonado MD;  Location:  OR     EYE SURGERY      Lasik     HC CAUTERY OF CERVIX, CRYOCAUTERY  3/23/03     MAMMOPLASTY REDUCTION BILATERAL  7/3/2014    Procedure: MAMMOPLASTY REDUCTION BILATERAL;  Surgeon: Yoanna Mccabe MD;  Location: [facility]     SIGMOIDOSCOPY FLEXIBLE N/A 3/21/2017    Procedure: SIGMOIDOSCOPY FLEXIBLE;  Surgeon: Yair Adkins MD;  Location:  GI     Prior to Admission medications    Medication Sig Start Date End Date Taking? Authorizing Provider   BuPROPion HCl (WELLBUTRIN SR PO) Take 150 mg by mouth 2 times daily   Yes Reported, Patient   Citalopram Hydrobromide (CELEXA  PO) Take 40 mg by mouth every morning   Yes Reported, Patient   Fluticasone Propionate (FLONASE NA) Apply 2 sprays into each nare daily as needed   Yes Reported, Patient   Levothyroxine Sodium (SYNTHROID PO) Take 137 mcg by mouth every morning   Yes Reported, Patient   Fexofenadine HCl (ALLEGRA PO) Take 1 tablet by mouth every morning   Yes Reported, Patient   mometasone-formoterol (DULERA) 100-5 MCG/ACT oral inhaler Inhale 2 puffs into the lungs 2 times daily 4/18/17  Yes Guille Torre MD   IBUPROFEN PO Take 800 mg by mouth daily as needed for moderate pain (Headaches)   Yes Reported, Patient   Acetaminophen (TYLENOL PO) Take 1,000 mg by mouth daily as needed for mild pain (Headaches)   Yes Reported, Patient   DiphenhydrAMINE HCl (BENADRYL PO) Take 50 mg by mouth At Bedtime (2 x 25 mg tablet = 50 mg dose)    Yes Reported, Patient   levalbuterol (XOPENEX HFA) 45 MCG/ACT inhaler Inhale 1-2 puffs into the lungs every 4 hours as needed for shortness of breath / dyspnea. 3/12/11  Yes Andreas Godoy MD   order for DME 1.) [place] ostomy bags #8578 #60  2.) Barrier strips elastic brava 5.5 C-shape #80  3.) Barrier Ring Brava Modable 48 mm soumya, 2 mm (thin) #60  4.) Sponge 4 x 4 ply non-sterile dermacea non-woven 200/pk- 6 pk.     Please1 fax to [employer] fax# [phone] 6/27/17   Guille Torre MD     Current Facility-Administered Medications Ordered in Epic   Medication Dose Route Frequency Last Rate Last Dose     chlorhexidine 2 % pads   Topical Once        Or     antimicrobial soap   Topical Once         ciprofloxacin (CIPRO) infusion 400 mg  400 mg Intravenous Pre-Op/Pre-procedure x 1 dose         ciprofloxacin (CIPRO) infusion 400 mg  400 mg Intravenous See Admin Instructions         metroNIDAZOLE (FLAGYL) infusion 500 mg  500 mg Intravenous Pre-Op/Pre-procedure x 1 dose         metroNIDAZOLE (FLAGYL) infusion 500 mg  500 mg Intravenous See Admin Instructions         lidocaine 1 % 1 mL  1 mL  Other Q1H PRN   1 mL at 09/06/17 1039     sodium chloride (PF) 0.9% PF flush 3 mL  3 mL Intracatheter Q8H         lactated ringers infusion   Intravenous Continuous 25 mL/hr at 09/06/17 1040       No current Epic-ordered outpatient prescriptions on file.     Wt Readings from Last 1 Encounters:   09/06/17 117.1 kg (258 lb 1.6 oz)     Temp Readings from Last 1 Encounters:   09/06/17 36.8  C (98.3  F) (Temporal)     BP Readings from Last 6 Encounters:   09/06/17 129/83   08/21/17 106/68   08/14/17 128/90   04/18/17 108/68   04/10/17 125/78   03/27/17 126/84     Pulse Readings from Last 4 Encounters:   08/21/17 94   04/18/17 89   04/09/17 92   03/27/17 87     Resp Readings from Last 1 Encounters:   09/06/17 16     SpO2 Readings from Last 1 Encounters:   09/06/17 98%     Recent Labs   Lab Test  08/21/17   1208  04/08/17   0720  04/07/17   0705  04/06/17   0703   NA   --    --   134  138   POTASSIUM  4.0   --   3.5  3.8   CHLORIDE   --    --   99  104   CO2   --    --   27  27   ANIONGAP   --    --   8  7   GLC  96   --   98  102*   BUN   --    --   8  9   CR   --   0.63  0.70  0.73   KATARZYNA   --    --   8.4*  8.3*     Recent Labs   Lab Test  08/21/17   1208  04/18/17   1629   04/07/17   0705   WBC   --   8.2   --   9.2   HGB  10.0*  10.7*   --   9.3*   PLT  152  582*   < >  201    < > = values in this interval not displayed.     Recent Labs   Lab Test  03/15/17   1624  01/02/17   0827   INR  0.90  0.89      RECENT LABS:   ECG:   ECHO:   CXR:                      .

## 2017-09-06 NOTE — ANESTHESIA CARE TRANSFER NOTE
Patient: Yahaira Ramirez    Procedure(s):  OPEN ILEOSTOMY REVERSAL  - Wound Class: II-Clean Contaminated    Diagnosis: rectal cancer  Diagnosis Additional Information: No value filed.    Anesthesia Type:   General, ETT     Note:  Airway :Face Mask  Patient transferred to:PACU  Comments: VSS. Airway and IV patent. Patient comfortable. Report to RN. Stable care transfer.      Vitals: (Last set prior to Anesthesia Care Transfer)    CRNA VITALS  9/6/2017 1344 - 9/6/2017 1420      9/6/2017             Pulse: 79    SpO2: 99 %    Resp Rate (set): 10                Electronically Signed By: DORENE Mackenzie CRNA  September 6, 2017  2:20 PM

## 2017-09-06 NOTE — PHARMACY-CONSULT NOTE
Alvimopan (Entereg) Pharmacy Consult    Pharmacy has been consulted to review this patient and determine if they are an appropriate candidate for post-operative use of alvimopan.    Current approved uses for alvimopan include:  laproscopic, open, or converted partial bowel resection.    The following criteria must be met to be a post-operative candidate for alvimopan:    Must have received pre-operative dose of alvimopan    Surgery performed was an open, laproscopic, or converted partial bowel resection with primary anastomisis.    Patient must not have been on chronic narcotics before surgery  o Alvimopan contraindicated if patient has received therapeutic doses of opioids for >7 consecutive days before surgery  o Alvimopan not recommended if patient has received >3 opioid doses in the last 7 days before surgery (increase the risk of patient developing significant GI side effects)    No history of myocardial infarction    No bowel obstruction present (or recent surgery for bowel obstruction)    No End-stage renal disease present    No Severe hepatic impairment present    Patient did not have a pancreatic or gastric anastomosis    Ordering provider has received alvimopan educational materials    This patient does meet the criteria for appropriate use of alvimopan.    Dosing recommendation:  Take one 12 mg capsule by mouth twice daily for up to 15 total doses    1st dose given 30 minutes to 5 hours prior to surgery    Then one 12 mg capsule twice daily for up to 14 additional doses    STOP Alvimopan as soon as GI function returns (upon 1st bowel movement)    May NOT be continued as an outpatient  Note: Alvimopan is a hard gelatin capsule which may not be crushed or opened    Thank you,  .me

## 2017-09-06 NOTE — BRIEF OP NOTE
[facility]    Brief Operative Note    Pre-operative diagnosis: rectal cancer  Post-operative diagnosis Status LAR with loop ileostomy  Procedure: Procedure(s):  OPEN ILEOSTOMY REVERSAL  - Wound Class: II-Clean Contaminated  Surgeon: Surgeon(s) and Role:     * Yair Adkins MD - Primary  Anesthesia: General   Estimated blood loss: Less than 50 ml  Drains: None  Specimens:   ID Type Source Tests Collected by Time Destination   A : ILEOSTOMY TRIM Tissue Other SURGICAL PATHOLOGY EXAM Yair Adkins MD 9/6/2017  1:19 PM      Findings:   Dense adhesions around stoma.  Soma serosal tears.    Complications: None.  Implants: None.      IVF: 1.5L  UOP: 200  Condition on discharge from OR: Satisfactory    Andreas Edmond MD   [facility]   [phone].        ADDENDUM:    PATIENT DATA  Indicate Y or N:  Home O2 No  Hemodialysis  No  Transplant patient  No  Cirrhosis  No  Steroids in last 30 days  No  Immunomodulators in last 30 days  No  Anticoagulation at time of surgery  No   List medication   Prior abdominal surgery  Yes  Pelvic irradiation  No    Albumin within 30 days if known  Hgb within 30 days if known   Hemoglobin   Date Value Ref Range Status   08/21/2017 10.0 (L) 11.7 - 15.7 g/dL Final   ]  Cr within 30 days if known    Creatinine   Date Value Ref Range Status   04/08/2017 0.63 0.52 - 1.04 mg/dL Final   ]  Body mass index is 41.66 kg/(m^2).      OR DATA  Emergent  No   <24 hours  No   <1 week  No  Bowel Prep No  Antibiotics  Yes  DVT prophylaxis    Heparin  Yes   SCD  Yes   None  No  Drain  No  ASA (1,2,3,4) 2  OR time (min) 120  Stents  No  Transfuse >/= 2U  No  Anastomosis   Stapled  Yes   Handsewn  No  Leak Test    Positive  No   Negative  No   Not done  No    Route (Have a good day)

## 2017-09-07 ENCOUNTER — CARE COORDINATION (OUTPATIENT)
Dept: CARE COORDINATION | Facility: CLINIC | Age: 42
End: 2017-09-07

## 2017-09-07 LAB
ANION GAP SERPL CALCULATED.3IONS-SCNC: 9 MMOL/L (ref 3–14)
BUN SERPL-MCNC: 9 MG/DL (ref 7–30)
CALCIUM SERPL-MCNC: 9.3 MG/DL (ref 8.5–10.1)
CHLORIDE SERPL-SCNC: 102 MMOL/L (ref 94–109)
CO2 SERPL-SCNC: 27 MMOL/L (ref 20–32)
COPATH REPORT: NORMAL
CREAT SERPL-MCNC: 0.69 MG/DL (ref 0.52–1.04)
ERYTHROCYTE [DISTWIDTH] IN BLOOD BY AUTOMATED COUNT: 16.9 % (ref 10–15)
GFR SERPL CREATININE-BSD FRML MDRD: >90 ML/MIN/1.7M2
GLUCOSE SERPL-MCNC: 88 MG/DL (ref 70–99)
HCT VFR BLD AUTO: 27.9 % (ref 35–47)
HGB BLD-MCNC: 9.3 G/DL (ref 11.7–15.7)
MAGNESIUM SERPL-MCNC: 2 MG/DL (ref 1.6–2.3)
MCH RBC QN AUTO: 35.2 PG (ref 26.5–33)
MCHC RBC AUTO-ENTMCNC: 33.3 G/DL (ref 31.5–36.5)
MCV RBC AUTO: 106 FL (ref 78–100)
PHOSPHATE SERPL-MCNC: 3.1 MG/DL (ref 2.5–4.5)
PLATELET # BLD AUTO: 255 10E9/L (ref 150–450)
POTASSIUM SERPL-SCNC: 3.7 MMOL/L (ref 3.4–5.3)
RBC # BLD AUTO: 2.64 10E12/L (ref 3.8–5.2)
SODIUM SERPL-SCNC: 138 MMOL/L (ref 133–144)
WBC # BLD AUTO: 10.8 10E9/L (ref 4–11)

## 2017-09-07 PROCEDURE — 80048 BASIC METABOLIC PNL TOTAL CA: CPT | Performed by: COLON & RECTAL SURGERY

## 2017-09-07 PROCEDURE — 25000128 H RX IP 250 OP 636: Performed by: COLON & RECTAL SURGERY

## 2017-09-07 PROCEDURE — 25000132 ZZH RX MED GY IP 250 OP 250 PS 637: Performed by: COLON & RECTAL SURGERY

## 2017-09-07 PROCEDURE — 84100 ASSAY OF PHOSPHORUS: CPT | Performed by: COLON & RECTAL SURGERY

## 2017-09-07 PROCEDURE — 36415 COLL VENOUS BLD VENIPUNCTURE: CPT | Performed by: COLON & RECTAL SURGERY

## 2017-09-07 PROCEDURE — 85027 COMPLETE CBC AUTOMATED: CPT | Performed by: COLON & RECTAL SURGERY

## 2017-09-07 PROCEDURE — 83735 ASSAY OF MAGNESIUM: CPT | Performed by: COLON & RECTAL SURGERY

## 2017-09-07 PROCEDURE — 25000125 ZZHC RX 250: Performed by: COLON & RECTAL SURGERY

## 2017-09-07 PROCEDURE — 25000132 ZZH RX MED GY IP 250 OP 250 PS 637: Performed by: PHYSICIAN ASSISTANT

## 2017-09-07 PROCEDURE — 25000128 H RX IP 250 OP 636: Performed by: PHYSICIAN ASSISTANT

## 2017-09-07 PROCEDURE — 12000007 ZZH R&B INTERMEDIATE

## 2017-09-07 RX ORDER — ACETAMINOPHEN 325 MG/1
650 TABLET ORAL EVERY 4 HOURS PRN
Status: DISCONTINUED | OUTPATIENT
Start: 2017-09-07 | End: 2017-09-08 | Stop reason: HOSPADM

## 2017-09-07 RX ORDER — HYDROMORPHONE HYDROCHLORIDE 1 MG/ML
.3-.5 INJECTION, SOLUTION INTRAMUSCULAR; INTRAVENOUS; SUBCUTANEOUS
Status: DISCONTINUED | OUTPATIENT
Start: 2017-09-07 | End: 2017-09-08 | Stop reason: HOSPADM

## 2017-09-07 RX ORDER — KETOROLAC TROMETHAMINE 15 MG/ML
15 INJECTION, SOLUTION INTRAMUSCULAR; INTRAVENOUS EVERY 6 HOURS PRN
Status: DISCONTINUED | OUTPATIENT
Start: 2017-09-07 | End: 2017-09-08 | Stop reason: HOSPADM

## 2017-09-07 RX ORDER — OXYCODONE HYDROCHLORIDE 5 MG/1
5 TABLET ORAL EVERY 4 HOURS PRN
Status: DISCONTINUED | OUTPATIENT
Start: 2017-09-07 | End: 2017-09-08 | Stop reason: HOSPADM

## 2017-09-07 RX ADMIN — BUPROPION HYDROCHLORIDE 150 MG: 150 TABLET, FILM COATED, EXTENDED RELEASE ORAL at 09:04

## 2017-09-07 RX ADMIN — BUPROPION HYDROCHLORIDE 150 MG: 150 TABLET, FILM COATED, EXTENDED RELEASE ORAL at 20:17

## 2017-09-07 RX ADMIN — ACETAMINOPHEN 650 MG: 325 TABLET, FILM COATED ORAL at 20:17

## 2017-09-07 RX ADMIN — ACETAMINOPHEN 650 MG: 325 TABLET, FILM COATED ORAL at 23:56

## 2017-09-07 RX ADMIN — CIPROFLOXACIN 400 MG: 2 INJECTION, SOLUTION INTRAVENOUS at 10:41

## 2017-09-07 RX ADMIN — FEXOFENADINE HYDROCHLORIDE 180 MG: 180 TABLET, FILM COATED ORAL at 10:28

## 2017-09-07 RX ADMIN — OXYCODONE HYDROCHLORIDE 5 MG: 5 TABLET ORAL at 20:25

## 2017-09-07 RX ADMIN — CITALOPRAM HYDROBROMIDE 40 MG: 40 TABLET ORAL at 09:04

## 2017-09-07 RX ADMIN — SODIUM CHLORIDE, POTASSIUM CHLORIDE, SODIUM LACTATE AND CALCIUM CHLORIDE: 600; 310; 30; 20 INJECTION, SOLUTION INTRAVENOUS at 10:28

## 2017-09-07 RX ADMIN — LEVOTHYROXINE SODIUM 137 MCG: 112 TABLET ORAL at 06:16

## 2017-09-07 RX ADMIN — ENOXAPARIN SODIUM 40 MG: 40 INJECTION SUBCUTANEOUS at 09:04

## 2017-09-07 RX ADMIN — METRONIDAZOLE 500 MG: 500 INJECTION, SOLUTION INTRAVENOUS at 12:22

## 2017-09-07 RX ADMIN — ALVIMOPAN 12 MG: 12 CAPSULE ORAL at 20:17

## 2017-09-07 RX ADMIN — KETOROLAC TROMETHAMINE 15 MG: 15 INJECTION, SOLUTION INTRAMUSCULAR; INTRAVENOUS at 13:35

## 2017-09-07 RX ADMIN — ALVIMOPAN 12 MG: 12 CAPSULE ORAL at 10:28

## 2017-09-07 RX ADMIN — HYDROMORPHONE HYDROCHLORIDE: 10 INJECTION, SOLUTION INTRAMUSCULAR; INTRAVENOUS; SUBCUTANEOUS at 06:16

## 2017-09-07 RX ADMIN — METRONIDAZOLE 500 MG: 500 INJECTION, SOLUTION INTRAVENOUS at 03:39

## 2017-09-07 NOTE — OP NOTE
DATE OF SERVICE:  09/06/2017.      PREOPERATIVE DIAGNOSIS:  Ileostomy status.      POSTOPERATIVE DIAGNOSIS:  Ileostomy status.      PROCEDURE PERFORMED:  Ileostomy reversal.      STAFF SURGEON:  Yair Adkins MD      FIRST ASSISTANT:  Andreas Abad MD, [facility] Colorectal Surgery Fellow      ANESTHESIA:  General.      ESTIMATED BLOOD LOSS:  50 mL.      SPECIMEN:  Ileostomy trim.      FINDINGS:  There were dense adhesions of the subcutaneous fat as well as the fascia to the stoma and we had some difficulty entering into the peritoneal cavity.  Several serosal tears were encountered in mobilizing the ileostomy.  We therefore ended up resecting the entire stoma above the level of the fascia to create a nice side-to-side functional end-to-end anastomosis.  She tolerated the procedure well.      INDICATIONS:  Yahaira Ramirez is a 42-year-old female who has a history of rectal cancer.  She underwent a robotic-assisted low anterior resection on 04/06/2017.  She had undergone neoadjuvant chemoradiotherapy.  She completed her adjuvant chemotherapy.  Recent evaluation demonstrated a healthy and patent coloanal anastomosis.  She presents today for ileostomy reversal.  The risks, benefits and alternatives of surgery, including bleeding, infection, anastomotic leak, injury to adjacent structures, development of a hernia, urinary tract infection, DVT, complications of anesthetic and even more serious complications, were discussed and she wishes to proceed.      DESCRIPTION OF PROCEDURE:  After obtaining informed consent, the patient was brought to the operating room, general anesthesia was induced, the patient was intubated by the anesthesia service without difficulty.  Her arms were kept out on an arm board.  She was kept in the supine position.  A Olvera catheter was placed under sterile conditions.  The abdomen was prepped and draped in the usual sterile fashion.  A timeout was undertaken, which identified the  patient and correct procedure.      We began by making an incision circumferentially around the stoma at the mucocutaneous junction.  Dissection was taken down with electrocautery circumferentially around the stoma.  There were fairly dense adhesions in the subcutaneous fat, as well as to the fascia, particularly on the lateral side.  We encountered a little bit of bleeding here from what was likely a vessel within the muscle just underneath the fascia.  This was clamped off with a right angle clamp and tied off with 0 Vicryl tie.  Eventually, with some difficulty, we were able to incise the peritoneum and enter into the peritoneal cavity, which was relatively free of adhesions.  There were some adhesions medially that could be taken down with visualization and some gentle blunt dissection, as well as some sharp dissection, that allowed us to exteriorize the terminal ileum.  When we had done this, we identified that there had been some serosal tears to the proximal limb of the ileostomy, as well as that the mesentery just at this level had been opened up a little bit, but that there was no active bleeding.  Therefore, I elected to resect all of this area with our stoma.  The total bowel length resected was approximately 10 cm.  The bowel for the points of transection was chosen proximally and distally.  The intervening mesentery was divided between Carmalt clamps and controlled proximally with a combination of Vicryl stick ties as well as Vicryl free ties.  Hemostasis along the cut edge of the mesentery was excellent.  Antimesenteric enterotomies were made and a blue load 80 mm MICHAEL stapler was fashioned within the small bowel to create a side-to-side functional end-to-end anastomosis.  The stapler was closed and fired.  The staple line as inspected and found to be hemostatic and the bowel well perfused at this level.  The common enterotomy was closed with firing of the TA 90 stapler and the specimen was excised with  Sushil scissors.  The bowel was then reinforced at the toe of the anastomosis and the anastomosis was then returned into the abdomen.  We did have to open up the fascia proximally and distally over a portion of about 2 cm during the course of our mobilization to get adequate exposure to mobilize the bowel.  The fascia was then reapproximated with 2 running sutures of looped 0 PDS.  Care was taken to avoid any injury to the underlying viscera.  Skin and subcutaneous fat were irrigated.  The skin was then partially reapproximated with a running Vicryl suture in a pursestring fashion to partially reapproximate the skin.  The wound was packed open and 30 mL of 0.5% Marcaine plain was infiltrated for local anesthesia.  The patient was awakened, extubated and transferred to the PACU in stable condition.  Lap, sponge and needle counts correct at the end of the case x2.         YAIR RICKETTS MD             D: 2017 09:39   T: 2017 13:17   MT: TS      Name:     SATYA OSORIO   MRN:      [id number]        Account:        [id number]   :      1975           Procedure Date: 2017      Document: [id number]       cc: Yair Gallegos MD

## 2017-09-07 NOTE — PLAN OF CARE
Problem: Goal Outcome Summary  Goal: Goal Outcome Summary  Outcome: Improving  A&O. AVSS.  PCA dilaudid (0.2) and scheduled tylenol for pain.Weaned from 3L nc to 1L.  LS clear.  Tolerating clears. BS hypoactive, no flatus yet.  Stood bedside.  Adequate uo via juarez.  Assist of 1.

## 2017-09-07 NOTE — PROGRESS NOTES
Clinic Care Coordination Contact    Situation: Patient chart reviewed by care coordinator.    Background: [facility] admission ---Takedown Ileostomy reversal  9/6/2017    Assessment: Continues to be hospitalized     Plan/Recommendations: CC will follow up when discharged from the hospital     Kandis Piper RN / Clinical Care Coordinator     [address] [address]  [email or web address]  Office :  [phone] / Fax :  [phone]

## 2017-09-07 NOTE — LETTER
[facility] Home  Complex Care Plan  About Me  Patient Name:  Yahaira Ramirez    YOB: 1975  Age:   42 year old   [place] MRN: [id number] Telephone Information:     Home Phone [phone]   Mobile [phone]       Address:    [address] [place] [address] [place] [address] Email address:  [email or web address]      Emergency Contact(s)  Name Relationship Lgl Grd Work Phone Home Phone Mobile Phone   1. CHERRI RAMIREZ Mother   [phone]           Primary language:  English     needed? No   [employer]:  [phone] op. 1  Other communication barriers: No  Preferred Method of Communication:  Letter, MyChart, Phone  Current living arrangement: I live in a private home with family  Mobility Status/ Medical Equipment: Independent  Other information to know about me:    Health Maintenance  Health Maintenance Reviewed: Not assessed    My Access Plan  Medical Emergency 911   Primary Clinic Line [facility]- [phone]   24 Hour Appointment Line [phone] or  [phone]) (toll-free)   24 Hour Nurse Line [phone] (toll-free)   Preferred Urgent Care [facility], [phone]   Preferred Hospital [facility]  [phone]   Preferred Pharmacy [id number] - [place], [place] - [address] [place] [address]     Behavioral Health Crisis Line The National Suicide Prevention Lifeline at 1-124.466.1447 or 911     My Care Team Members  Patient Care Team       Relationship Specialty Notifications Start End    Guille Torre MD PCP - General   2/15/02     Phone: [phone] Fax: [phone]         [address] [address]    Kandis Piper RN Clinic Care Coordinator  Admissions 4/11/17     Phone: [phone] Fax: [phone]        Yair Adkins MD MD Colon and Rectal Surgery  4/11/17     Phone: [phone] Fax: [phone]         [facility]  [address] [facility] [address] [facility] [address]         My Care Plans  Self Management and Treatment Plan  Goals and (Comments)  Goal #1:  (I will watch for signs of infection )      50% of goal reached      Action Plans on File: None  Advance Care Plans/Directives Type:   Type Advanced Care Plans/Directives:  (NA)    My Medical and Care Information  Problem List   Patient Active Problem List   Diagnosis     Hypertrophy of breast     CARDIOVASCULAR SCREENING; LDL GOAL LESS THAN 160     Acquired hypothyroidism     Mild intermittent asthma without complication     Major depressive disorder, recurrent episode, mild (H)     Mirena inserted : due for removal 2019 or 2021     Rectal cancer (H)     Morbid obesity due to excess calories (H)     Ileostomy status (H)      Current Medications and Allergies:  See printed Medication Report.    Care Coordination Start Date: 04/11/17   Frequency of Care Coordination:  (CC will follow up after home care discharge )   Form Last Updated: 09/07/2017

## 2017-09-07 NOTE — PLAN OF CARE
"Problem: Perioperative Period (Adult)  Goal: Signs and Symptoms of Listed Potential Problems Will be Absent or Manageable (Perioperative Period)  Signs and symptoms of listed potential problems will be absent or manageable by discharge/transition of care (reference Perioperative Period (Adult) CPG).   Outcome: Improving  A&O, VSS, pain 4/10 managed with PCA. Up with SBA ambulating halls. Pt reporting \"gas discomfort\", unable to pass flatus. Shadowing on takedown dressing site. judith clears.       "

## 2017-09-07 NOTE — PLAN OF CARE
Problem: Goal Outcome Summary  Goal: Goal Outcome Summary  Outcome: No Change  A&O. VSS.CMS intact. Abdominal dressing changed and CDI. Gas Pain 4/10, heat packs and walking. PCA for pain, planning to d/c PCA. Plan to give Toradol for pain. Up independently in halls. Full liquid diet. Not passing gas and no BM. Olvera d/c, due to void. Pt denies nausea.

## 2017-09-07 NOTE — PROGRESS NOTES
"  Colon and Rectal Surgery Progress Note          Assessment and Plan:   POD #1 ileostomy reversal    D/c juarez  Decrease IV fluids  If hgb ok, start toradol today  If no nausea with liquids, will d/c pca and start oral pain meds with IV back up later today    Yair Adkins MD  Colorectal Surgery  [phone] (office)  [phone] (pager)  [email or web address]             Interval History:   Had lots of gas cramps.  Not passing gas.  Walked.              Physical Exam:   Vitals were reviewed  Patient Vitals for the past 24 hrs:   BP Temp Temp src Heart Rate Resp SpO2 Height Weight   09/07/17 0616 - - - - 16 - - -   09/07/17 0400 - - - - 16 - - -   09/07/17 0343 131/74 98.3  F (36.8  C) Oral 88 16 98 % - -   09/07/17 0300 125/79 - - 91 16 94 % - -   09/07/17 0000 128/82 98.1  F (36.7  C) Oral 93 16 99 % - -   09/06/17 2000 131/81 98.1  F (36.7  C) Oral 96 16 98 % - -   09/06/17 1900 124/70 - - 72 - 98 % - -   09/06/17 1800 126/73 - - 84 - 98 % - -   09/06/17 1700 132/74 - - 81 - 98 % - -   09/06/17 1637 - - - - - 98 % - -   09/06/17 1618 - - - - - 98 % - -   09/06/17 1617 126/70 98  F (36.7  C) Oral 68 15 99 % - -   09/06/17 1550 - - - - - 95 % - -   09/06/17 1540 112/64 - - 89 12 97 % - -   09/06/17 1530 116/70 98.9  F (37.2  C) Temporal 87 17 99 % - -   09/06/17 1520 115/67 - - 78 15 97 % - -   09/06/17 1515 - - - 85 12 98 % - -   09/06/17 1510 114/77 - - 70 10 98 % - -   09/06/17 1500 121/78 - - 89 21 97 % - -   09/06/17 1450 116/73 - - 92 8 93 % - -   09/06/17 1440 121/73 - - 89 12 100 % - -   09/06/17 1430 114/66 - - 79 25 100 % - -   09/06/17 1420 113/62 97.4  F (36.3  C) Temporal 78 19 100 % - -   09/06/17 1417 117/61 - - 80 13 100 % - -   09/06/17 1020 129/83 98.3  F (36.8  C) Temporal - 16 98 % 1.676 m (5' 6\") 117.1 kg (258 lb 1.6 oz)         Intake/Output Summary (Last 24 hours) at 09/07/17 0724  Last data filed at 09/07/17 0635   Gross per 24 hour   Intake             3802 ml   Output             1975 ml   Net   "           1827 ml       Head - Nomocephalic atraumatic  Sclera anicteric  Extremities warm and well perfused  Lungs - breathing non labored,   Abdomen - soft, obese, non tender  Rectal exam - deferred  Skin - no rash  Psych - affect appropriate  Neuro - no focal deficits             Data:   All laboratory and imaging data in the past 24 hours reviewed    CBC  Lab Results   Component Value Date    WBC 8.2 04/18/2017    WBC 9.2 04/07/2017    WBC 7.5 04/06/2017    HGB 10.0 (L) 08/21/2017    HGB 10.7 (L) 04/18/2017    HGB 9.3 (L) 04/07/2017    HCT 34.1 (L) 04/18/2017    HCT 27.8 (L) 04/07/2017    HCT 31.5 (L) 04/06/2017     09/06/2017     08/21/2017     (H) 04/18/2017       BMP  Recent Labs   Lab Test  09/06/17   1800  08/21/17   1208  04/08/17   0720  04/07/17   0705  04/06/17   0703   NA   --    --    --   134  138   POTASSIUM   --   4.0   --   3.5  3.8   CHLORIDE   --    --    --   99  104   CO2   --    --    --   27  27   ANIONGAP   --    --    --   8  7   GLC   --   96   --   98  102*   BUN   --    --    --   8  9   CR  0.77   --   0.63  0.70  0.73   KATARZYNA   --    --    --   8.4*  8.3*       Liver Function Studies -   Recent Labs   Lab Test  10/28/16   0910   PROTTOTAL  8.0   ALBUMIN  3.9   BILITOTAL  0.4   ALKPHOS  103   AST  9   ALT  17                      Medications:     Current Facility-Administered Medications Ordered in Epic   Medication Dose Route Frequency Last Rate Last Dose     buPROPion (WELLBUTRIN SR) 12 hr tablet 150 mg  150 mg Oral BID   150 mg at 09/06/17 2028     citalopram (celeXA) tablet 40 mg  40 mg Oral QAM         fexofenadine (ALLEGRA) tablet 180 mg  180 mg Oral QAM         levalbuterol (XOPENEX HFA) Inhaler 1-2 puff  1-2 puff Inhalation Q4H PRN         levothyroxine (SYNTHROID/LEVOTHROID) tablet 137 mcg  137 mcg Oral QAM   137 mcg at 09/07/17 0616     mometasone-formoterol (DULERA) 100-5 MCG/ACT oral inhaler 2 puff  2 puff Inhalation BID   2 puff at 09/06/17 2028      lidocaine 1 % 1 mL  1 mL Other Q1H PRN         lidocaine (LMX4) cream   Topical Q1H PRN         sodium chloride (PF) 0.9% PF flush 3 mL  3 mL Intracatheter Q1H PRN         sodium chloride (PF) 0.9% PF flush 3 mL  3 mL Intracatheter Q8H         enoxaparin (LOVENOX) injection 40 mg  40 mg Subcutaneous Q24H         lactated ringers BOLUS 500 mL  500 mL Intravenous Q4H PRN         lactated ringers infusion   Intravenous Continuous 75 mL/hr at 09/06/17 1703       ciprofloxacin (CIPRO) infusion 400 mg  400 mg Intravenous Q12H 200 mL/hr at 09/06/17 2258 400 mg at 09/06/17 2258     metroNIDAZOLE (FLAGYL) infusion 500 mg  500 mg Intravenous Q8H 100 mL/hr at 09/07/17 0339 500 mg at 09/07/17 0339     HYDROmorphone (DILAUDID) PCA 1 mg/mL   Intravenous PCA         naloxone (NARCAN) injection 0.1-0.4 mg  0.1-0.4 mg Intravenous Q2 Min PRN         diphenhydrAMINE (BENADRYL) injection 25 mg  25 mg Intravenous Q6H PRN         ondansetron (ZOFRAN) injection 4 mg  4 mg Intravenous Q6H PRN         prochlorperazine (COMPAZINE) injection 5-10 mg  5-10 mg Intravenous Q6H PRN         alvimopan (ENTEREG) capsule 12 mg  12 mg Oral BID         benzocaine-menthol (CHLORASEPTIC) 6-10 MG lozenge 1-2 lozenge  1-2 lozenge Buccal Q1H PRN         No current [facility]-ordered outpatient prescriptions on file.

## 2017-09-08 VITALS
HEIGHT: 66 IN | OXYGEN SATURATION: 96 % | BODY MASS INDEX: 41.48 KG/M2 | RESPIRATION RATE: 16 BRPM | TEMPERATURE: 98.7 F | WEIGHT: 258.1 LBS | DIASTOLIC BLOOD PRESSURE: 80 MMHG | SYSTOLIC BLOOD PRESSURE: 117 MMHG | HEART RATE: 107 BPM

## 2017-09-08 LAB
CREAT SERPL-MCNC: 0.66 MG/DL (ref 0.52–1.04)
GFR SERPL CREATININE-BSD FRML MDRD: >90 ML/MIN/1.7M2

## 2017-09-08 PROCEDURE — 82565 ASSAY OF CREATININE: CPT | Performed by: COLON & RECTAL SURGERY

## 2017-09-08 PROCEDURE — 25000128 H RX IP 250 OP 636: Performed by: COLON & RECTAL SURGERY

## 2017-09-08 PROCEDURE — 36415 COLL VENOUS BLD VENIPUNCTURE: CPT | Performed by: COLON & RECTAL SURGERY

## 2017-09-08 PROCEDURE — 25000132 ZZH RX MED GY IP 250 OP 250 PS 637: Performed by: PHYSICIAN ASSISTANT

## 2017-09-08 PROCEDURE — 25000132 ZZH RX MED GY IP 250 OP 250 PS 637: Performed by: COLON & RECTAL SURGERY

## 2017-09-08 RX ORDER — OXYCODONE HYDROCHLORIDE 5 MG/1
5 TABLET ORAL EVERY 4 HOURS PRN
Qty: 40 TABLET | Refills: 0 | Status: SHIPPED | OUTPATIENT
Start: 2017-09-08 | End: 2018-05-21

## 2017-09-08 RX ADMIN — ACETAMINOPHEN 650 MG: 325 TABLET, FILM COATED ORAL at 04:07

## 2017-09-08 RX ADMIN — ACETAMINOPHEN 650 MG: 325 TABLET, FILM COATED ORAL at 10:41

## 2017-09-08 RX ADMIN — BUPROPION HYDROCHLORIDE 150 MG: 150 TABLET, FILM COATED, EXTENDED RELEASE ORAL at 08:14

## 2017-09-08 RX ADMIN — FEXOFENADINE HYDROCHLORIDE 180 MG: 180 TABLET, FILM COATED ORAL at 08:14

## 2017-09-08 RX ADMIN — CITALOPRAM HYDROBROMIDE 40 MG: 40 TABLET ORAL at 08:14

## 2017-09-08 RX ADMIN — ENOXAPARIN SODIUM 40 MG: 40 INJECTION SUBCUTANEOUS at 08:14

## 2017-09-08 NOTE — DISCHARGE INSTRUCTIONS
Discharge Instructions following Abdominal Surgery  [facility]      Bowel Function  After removal of a portion of the intestines, it is normal for bowel movements to be erratic.  They are often looser and more frequent.  This condition generally resolves within a few weeks or it can be controlled with diet or medication.  Diet  In general, you may return to a well-balanced diet upon discharge.  Your doctor will let you know when to add extra fiber to your diet.  Be sure to drink plenty of fluids.  Incision  You should expect some discomfort in the area of your incision, particularly as you increase your activity.  If you notice an area of increasing redness or new drainage, please call your doctor.  You may shower as desired but refrain from tub baths or swimming until the incisions are fully healed.  Activity  Gradually increase your activity each day.  There are generally no restrictions on walking, climbing stairs, or riding in a car.  You can usually drive a car 7-10 days after your leave the hospital.  Do not drive while taking narcotic pain medications.  Ask your doctor regarding resumption of physical sexual activity; in most cases, you can resume sex after a few weeks.  Your physician will advise you about when to return to work.  It is preferable to have somebody stay with you at home until you are fully healed and able to resume a normal level of activity   Medications  You will be discharged with a prescription pain medication and any medications you were taking prior to surgery.  Do not drive while taking narcotic pain medications.  If you need additional medications or a refill, you must call your doctor during normal business hours.  It is the policy of [facility] to not refill pain medication after hours or on weekends because your chart is not available.  Follow-up  Typically, you will be asked to schedule a follow-up office visit 1  to 4 weeks after surgery.  If you have any questions related to follow-up, medications, or your condition, please call the office.      Call your surgeon if you have these signs or symptoms:  ([phone])    Problem with the incision, including increasing pain, swelling, redness, or drainage    Increasing abdominal pain    Uncontrolled nausea or vomiting    Fever or chills    Constipation  (no bowel movement for 3 days)    Diarrhea (more than 3 watery stools within 24 hours)    Bleeding from the rectum, wound, or stoma    Any questions or concerns

## 2017-09-08 NOTE — DISCHARGE SUMMARY
[facility] Discharge Summary      Yahaira Ramirez MRN# [id number]   Age: 42 year old YOB: 1975     Date of Admission:  9/6/2017  Date of Discharge::  9/8/2017 12:15 PM  Admitting Physician:  Yair Adkins MD  Discharge Physician:  Yair Adkins MD     PCP:  Guille Torre    Disposition: Patient discharged from [facility] to home in stable condition.        Primary Diagnosis:   Unwanted ileostomy         Discharge Medications:   Discharge Medication List as of 9/8/2017 11:59 AM      START taking these medications    Details   oxyCODONE (ROXICODONE) 5 MG IR tablet Take 1 tablet (5 mg) by mouth every 4 hours as needed for moderate to severe pain, Disp-40 tablet, R-0, Local Print         CONTINUE these medications which have NOT CHANGED    Details   BuPROPion HCl (WELLBUTRIN SR PO) Take 150 mg by mouth 2 times daily, Historical      Citalopram Hydrobromide (CELEXA PO) Take 40 mg by mouth every morning, Historical      Fluticasone Propionate (FLONASE NA) Apply 2 sprays into each nare daily as needed, Historical      Levothyroxine Sodium (SYNTHROID PO) Take 137 mcg by mouth every morning, Historical      Fexofenadine HCl (ALLEGRA PO) Take 1 tablet by mouth every morning, Historical      mometasone-formoterol (DULERA) 100-5 MCG/ACT oral inhaler Inhale 2 puffs into the lungs 2 times daily, Disp-1 Inhaler, R-11, E-PrescribeProfile Rx: patient will contact pharmacy when needed      IBUPROFEN PO Take 800 mg by mouth daily as needed for moderate pain (Headaches), Historical      Acetaminophen (TYLENOL PO) Take 1,000 mg by mouth daily as needed for mild pain (Headaches), Historical      DiphenhydrAMINE HCl (BENADRYL PO) Take 50 mg by mouth At Bedtime (2 x 25 mg tablet = 50 mg dose) , Historical      levalbuterol (XOPENEX HFA) 45 MCG/ACT inhaler Inhale 1-2 puffs into the lungs every 4 hours as needed for shortness of breath / dyspnea., Disp-1 Inhaler, R-3, Historical      order for DME  1.) Jareth ostomy bags #8578 #60  2.) Barrier strips elastic brava 5.5 C-shape #80  3.) Barrier Ring Brava Modable 48 mm soumya, 2 mm (thin) #60  4.) Sponge 4 x 4 ply non-sterile dermacea non-woven 200/pk- 6 pk.     Please1 fax to [employer] fa x# [phone]-1 Units, R-0, Local Print                    Follow Up, Special Instructions:   Discharge diet: Low residue   Discharge activity: No straining, lifting greater than 15-20lbs, or strenuous exercise for 6 weeks.    Discharge follow-up: Follow up with Dr. Adkins in 3-4 weeks   Wound care: Daily dressing changes  Keep wound clean and dry              Procedures:   Procedure(s): Ileostomy reversal       No other procedures performed during this admission            Consultations:   n/a          Brief Hospital Summary:   Patient is a 42 year old woman whom underwent ileostomy reversal on 9/6/17 by Dr. Adkins.   There were no immediate complications during this procedure.    Please refer to the full operative summary for details.  The patient's hospital course was unremarkable. Diet was advanced with return of bowel function. Pain medications were transitioned from IV to oral eventually. At the time of discharge, she was voiding freely, tolerating diet, and pain was well controlled with oral pain medications.          Attestation:  I have reviewed today's vital signs, notes, medications, labs and imaging.    Catalina Gomez PA-C  [facility]          ADDENDUM:  Length of stay: 2 days  Indicate Y or N for the following:  UTI  No  C diff  No  PNA  No  SSI No  DVT No  PE  No  CVA No  MI No  Enterocutaneous fistula  No  Peripheral nerve injury  No  Abscess (not adjacent to anastomosis)  No  Leak No    Treated with:   Antibiotics N/A   Drain  N/A   Reoperation  N/A  Death within 30 days No  Reintubation  No  Reoperation  No   Procedure     FOR CANCER CASES: n/a

## 2017-09-08 NOTE — PLAN OF CARE
Problem: Goal Outcome Summary  Goal: Goal Outcome Summary  Outcome: Improving  VSS other than tachy max 111. Up ind ambulating halls. Pain managed with PO tylenol. Dressing D/I. BS hypo, reporting passing small amount of flatus. Voiding. Pt reporting frequent small brown loose BM's starting 0500.

## 2017-09-08 NOTE — PROGRESS NOTES
"COLON & RECTAL SURGERY  PROGRESS NOTE    September 8, 2017  Post-op Day # 2 ileostomy reversal    SUBJECTIVE: Pt doing well. Ready to go home. She denies significant pain or nausea. Tolerated full liquids without nausea. Feels \"full\" but improves with gas.      OBJECTIVE:  Temp:  [98.1  F (36.7  C)-98.5  F (36.9  C)] 98.2  F (36.8  C)  Pulse:  [] 90  Heart Rate:  [] 96  Resp:  [16] 16  BP: (103-137)/(57-82) 137/77  SpO2:  [95 %-100 %] 95 %    Intake/Output Summary (Last 24 hours) at 09/08/17 0940  Last data filed at 09/08/17 0821   Gross per 24 hour   Intake             1386 ml   Output             1825 ml   Net             -439 ml       GENERAL:  Awake, alert, no acute distress, sitting in chair  HEAD: Nomocephalic atraumatic  SCLERA: anicteric  EXTREMITIES: warm and well perfused  ABDOMEN:  Soft, appropriately tender, non-distended, no rebound or guarding, no peritoneal signs.  INCISION:  C/d/i, ileostomy reversal site with dry bandage    LABS:  Lab Results   Component Value Date    WBC 10.8 09/07/2017     Lab Results   Component Value Date    HGB 9.3 09/07/2017     Lab Results   Component Value Date    HCT 27.9 09/07/2017     Lab Results   Component Value Date     09/07/2017     Last Basic Metabolic Panel:  Lab Results   Component Value Date     09/07/2017      Lab Results   Component Value Date    POTASSIUM 3.7 09/07/2017     Lab Results   Component Value Date    CHLORIDE 102 09/07/2017     Lab Results   Component Value Date    KATARZYNA 9.3 09/07/2017     Lab Results   Component Value Date    CO2 27 09/07/2017     Lab Results   Component Value Date    BUN 9 09/07/2017     Lab Results   Component Value Date    CR 0.66 09/08/2017     Lab Results   Component Value Date    GLC 88 09/07/2017       ASSESSMENT/PLAN: POD  2 ileostomy reversal. Afebrile, tachycardic to 110s overnight, clinically stable and improving.      1. Advance to low fiber  2. Continue PO pain control  3. Lovenox for " prophylaxis, does not need on discharge  4. Encourage OOB, ambulate  5. Dispo: d/c home today    Catalina Gomez PA-C  [facility]  Phone: [phone]

## 2017-09-08 NOTE — PLAN OF CARE
Problem: Goal Outcome Summary  Goal: Goal Outcome Summary  Outcome: Adequate for Discharge Date Met:  09/08/17  A&O. VSS. CMS intact. Dressing CDI. Denies nausea. Passing gas, BM and voiding. Pain managed with Tylenol. Discussed d/c instructions and medications with patient, verbalized understanding. Pt d/c with all belongings and medications. Transportation provided by mother.

## 2017-09-08 NOTE — PLAN OF CARE
Problem: Goal Outcome Summary  Goal: Goal Outcome Summary  Outcome: Improving  Passing gas now, pain controlled with Oxy due to loss of IV access. Up ad chelita in zhang and room

## 2017-09-11 ENCOUNTER — TELEPHONE (OUTPATIENT)
Dept: INTERNAL MEDICINE | Facility: CLINIC | Age: 42
End: 2017-09-11

## 2017-09-11 ENCOUNTER — CARE COORDINATION (OUTPATIENT)
Dept: CARE COORDINATION | Facility: CLINIC | Age: 42
End: 2017-09-11

## 2017-09-11 NOTE — TELEPHONE ENCOUNTER
See care coordination encounter.  This encounter closed     Kandis Piper RN / Clinical Care Coordinator     [address] [address]  [email or web address]  Office :  [phone] / Fax :  [phone]

## 2017-09-11 NOTE — PROGRESS NOTES
Clinic Care Coordination Contact  OUTREACH    Referral Information:  Referral Source: CTS  Reason for Contact:  9/6/2017---- Ileostomy reversal  Care Conference: No     Universal Utilization:   ED Visits in last year: 0  Hospital visits in last year: 5  Last PCP appointment: 03/15/17  Missed Appointments: 0  Concerns: No  Multiple Providers or Specialists:  (Yes)    Clinical Concerns:  Current Medical Concerns: patient continues to have the stoma until it heals.  Patient is changing the dressing every day.  Patient is aware to call surgeon with any questions or concerns .  The first 2 days after surgery the patient had diarrhea but it is slowing down and just passing gas.  Patient is eating a low fiber diet.  Patient states her bottom is sore but doesn't feel the need to use Oxycodone      Clinical Pathway Name: None  Clinical Pathway: None    Medication Management:  Reviewed      Functional Status:  Mobility Status: Independent  Equipment Currently Used at Home: none     Psychosocial:  Current living arrangement:: I live in a private home with family     Resources and Interventions:  Current Resources: Home Care; Home Care  PAS Number:  (NA)  Senior Linkage Line Referral Placed:  (NA)  Advanced Care Plans/Directives on file:: No  Referrals Placed:  (NA)     Goals:   Goal 1 Statement:  (I will watch for signs of infection )-I will call my surgeon if increased drainage redness,swelling  or fever         Barriers: No barriers identified   Strengths: Patient has a good attitude towards the healing process   Patient/Caregiver understanding: Expresses good understanding of discharge instructions   Frequency of Care Coordination:  Every 1-2 weeks   Upcoming appointment: Surgeon appointment October 5     Plan: CC will follow up in 1-2 weeks     Kandis Piper RN / Clinical Care Coordinator     [address] [address]  [email or web address]  Office :  [phone]  / Fax :  [phone]

## 2017-09-18 ENCOUNTER — HOSPITAL ENCOUNTER (OUTPATIENT)
Facility: CLINIC | Age: 42
Discharge: HOME | End: 2017-09-18
Attending: RADIOLOGY | Admitting: RADIOLOGY
Payer: COMMERCIAL

## 2017-09-18 ENCOUNTER — APPOINTMENT (OUTPATIENT)
Dept: INTERVENTIONAL RADIOLOGY/VASCULAR | Facility: CLINIC | Age: 42
End: 2017-09-18
Attending: INTERNAL MEDICINE
Payer: COMMERCIAL

## 2017-09-18 VITALS
SYSTOLIC BLOOD PRESSURE: 139 MMHG | RESPIRATION RATE: 14 BRPM | HEIGHT: 66 IN | DIASTOLIC BLOOD PRESSURE: 89 MMHG | TEMPERATURE: 97.8 F | WEIGHT: 253.53 LBS | HEART RATE: 91 BPM | OXYGEN SATURATION: 97 % | BODY MASS INDEX: 40.75 KG/M2

## 2017-09-18 DIAGNOSIS — C80.1 CANCER (H): ICD-10-CM

## 2017-09-18 PROCEDURE — 27210820 ZZH WOUND GLUE CR3

## 2017-09-18 PROCEDURE — 36590 REMOVAL TUNNELED CV CATH: CPT

## 2017-09-18 PROCEDURE — 25000125 ZZHC RX 250: Performed by: RADIOLOGY

## 2017-09-18 PROCEDURE — 25000128 H RX IP 250 OP 636: Performed by: RADIOLOGY

## 2017-09-18 PROCEDURE — 27210904 ZZH KIT CR6

## 2017-09-18 PROCEDURE — 99152 MOD SED SAME PHYS/QHP 5/>YRS: CPT

## 2017-09-18 PROCEDURE — 99153 MOD SED SAME PHYS/QHP EA: CPT

## 2017-09-18 RX ORDER — LIDOCAINE HYDROCHLORIDE 10 MG/ML
1-30 INJECTION, SOLUTION EPIDURAL; INFILTRATION; INTRACAUDAL; PERINEURAL
Status: COMPLETED | OUTPATIENT
Start: 2017-09-18 | End: 2017-09-18

## 2017-09-18 RX ORDER — CEFAZOLIN SODIUM 2 G/100ML
2 INJECTION, SOLUTION INTRAVENOUS
Status: COMPLETED | OUTPATIENT
Start: 2017-09-18 | End: 2017-09-18

## 2017-09-18 RX ORDER — FENTANYL CITRATE 50 UG/ML
INJECTION, SOLUTION INTRAMUSCULAR; INTRAVENOUS
Status: DISCONTINUED
Start: 2017-09-18 | End: 2017-09-18 | Stop reason: HOSPADM

## 2017-09-18 RX ORDER — FENTANYL CITRATE 50 UG/ML
25-50 INJECTION, SOLUTION INTRAMUSCULAR; INTRAVENOUS EVERY 5 MIN PRN
Status: DISCONTINUED | OUTPATIENT
Start: 2017-09-18 | End: 2017-09-18 | Stop reason: HOSPADM

## 2017-09-18 RX ORDER — LIDOCAINE 40 MG/G
CREAM TOPICAL
Status: DISCONTINUED | OUTPATIENT
Start: 2017-09-18 | End: 2017-09-18 | Stop reason: HOSPADM

## 2017-09-18 RX ORDER — FLUMAZENIL 0.1 MG/ML
0.2 INJECTION, SOLUTION INTRAVENOUS
Status: DISCONTINUED | OUTPATIENT
Start: 2017-09-18 | End: 2017-09-18 | Stop reason: HOSPADM

## 2017-09-18 RX ORDER — NALOXONE HYDROCHLORIDE 0.4 MG/ML
.1-.4 INJECTION, SOLUTION INTRAMUSCULAR; INTRAVENOUS; SUBCUTANEOUS
Status: DISCONTINUED | OUTPATIENT
Start: 2017-09-18 | End: 2017-09-18 | Stop reason: HOSPADM

## 2017-09-18 RX ORDER — CEFAZOLIN SODIUM 2 G/100ML
INJECTION, SOLUTION INTRAVENOUS
Status: DISCONTINUED
Start: 2017-09-18 | End: 2017-09-18 | Stop reason: HOSPADM

## 2017-09-18 RX ADMIN — MIDAZOLAM 1 MG: 1 INJECTION INTRAMUSCULAR; INTRAVENOUS at 09:04

## 2017-09-18 RX ADMIN — FENTANYL CITRATE 50 MCG: 50 INJECTION INTRAMUSCULAR; INTRAVENOUS at 09:04

## 2017-09-18 RX ADMIN — LIDOCAINE HYDROCHLORIDE 160 MG: 10 INJECTION, SOLUTION EPIDURAL; INFILTRATION; INTRACAUDAL; PERINEURAL at 09:04

## 2017-09-18 RX ADMIN — CEFAZOLIN SODIUM 2 G: 2 INJECTION, SOLUTION INTRAVENOUS at 08:55

## 2017-09-18 RX ADMIN — MIDAZOLAM 1 MG: 1 INJECTION INTRAMUSCULAR; INTRAVENOUS at 09:07

## 2017-09-18 RX ADMIN — FENTANYL CITRATE 50 MCG: 50 INJECTION INTRAMUSCULAR; INTRAVENOUS at 09:06

## 2017-09-18 NOTE — IP AVS SNAPSHOT
MRN:[id number]                      After Visit Summary   9/18/2017    Yahaira Ramirez    MRN: [id number]           Visit Information        Department      9/18/2017  7:16 AM [facility]          Review of your medicines      UNREVIEWED medicines. Ask your doctor about these medicines        Dose / Directions    ALLEGRA PO        Dose:  1 tablet   Take 1 tablet by mouth every morning   Refills:  0       BENADRYL PO   Indication:  Trouble Sleeping        Dose:  50 mg   Take 50 mg by mouth At Bedtime (2 x 25 mg tablet = 50 mg dose)   Refills:  0       CELEXA PO        Dose:  40 mg   Take 40 mg by mouth every morning   Refills:  0       FLONASE NA        Dose:  2 spray   Apply 2 sprays into each nare daily as needed   Refills:  0       IBUPROFEN PO        Dose:  800 mg   Take 800 mg by mouth daily as needed for moderate pain (Headaches)   Refills:  0       levalbuterol 45 MCG/ACT Inhaler   Commonly known as:  XOPENEX HFA        Dose:  1-2 puff   Inhale 1-2 puffs into the lungs every 4 hours as needed for shortness of breath / dyspnea.   Quantity:  1 Inhaler   Refills:  3       mometasone-formoterol 100-5 MCG/ACT oral inhaler   Commonly known as:  DULERA   Used for:  Mild intermittent asthma without complication        Dose:  2 puff   Inhale 2 puffs into the lungs 2 times daily   Quantity:  1 Inhaler   Refills:  11       oxyCODONE 5 MG IR tablet   Commonly known as:  ROXICODONE   Used for:  Ileostomy status (H)        Dose:  5 mg   Take 1 tablet (5 mg) by mouth every 4 hours as needed for moderate to severe pain   Quantity:  40 tablet   Refills:  0       SYNTHROID PO        Dose:  137 mcg   Take 137 mcg by mouth every morning   Refills:  0       TYLENOL PO        Dose:  1000 mg   Take 1,000 mg by mouth daily as needed for mild pain (Headaches)   Refills:  0       WELLBUTRIN SR PO        Dose:  150 mg   Take 150 mg by mouth 2 times daily   Refills:  0         CONTINUE these medicines which  have NOT CHANGED        Dose / Directions    order for DME   Used for:  Rectal cancer (H)        1.) [place] ostomy bags #8578 #60 2.) Barrier strips elastic brava 5.5 C-shape #80 3.) Barrier Ring Brava Modable 48 mm soumya, 2 mm (thin) #60 4.) Sponge 4 x 4 ply non-sterile dermacea non-woven 200/pk- 6 pk.   Please1 fax to [employer] fax# [phone]   Quantity:  1 Units   Refills:  0                Protect others around you: Learn how to safely use, store and throw away your medicines at www.disposemymeds.org.         Follow-ups after your visit        Your next 10 appointments already scheduled     Oct 30, 2017 11:00 AM CDT   CT CHEST/ABDOMEN/PELVIS W CONTRAST with [address] ([facility])    [address] [address] [address] [phone]           Please bring any scans or X-rays taken at other hospitals, if similar tests were done. Also bring a list of your medicines, including vitamins, minerals and over-the-counter drugs. It is safest to leave personal items at home.  Be sure to tell your doctor:   If you have any allergies.   If there s any chance you are pregnant.   If you are breastfeeding.   If you have any special needs.  You may have contrast for this exam. To prepare:   Do not eat or drink for 2 hours before your exam. If you need to take medicine, you may take it with small sips of water. (We may ask you to take liquid medicine as well.)   The day before your exam, drink extra fluids at least six 8-ounce glasses (unless your doctor tells you to restrict your fluids).  Patients over 70 or patients with diabetes or kidney problems:   If you haven t had a blood test (creatinine test) within the last 30 days, go to your clinic or Diagnostic Imaging Department for this test.  If you have diabetes:   If your kidney function is normal, continue taking your metformin (Avandamet, Glucophage, Glucovance, Metaglip) on the day  of your exam.   If your kidney function is abnormal, wait 48 hours before restarting this medicine.  You will have oral contrast for this exam:   You will drink the contrast at home. Get this from your clinic or Diagnostic Imaging Department. Please follow the directions given.  Please wear loose clothing, such as a sweat suit or jogging clothes. Avoid snaps, zippers and other metal. We may ask you to undress and put on a hospital gown.  If you have any questions, please call the Imaging Department where you will have your exam.               Care Instructions        Further instructions from your care team         Going Home after the   Removal of Your Port   ______________________________________________________________________    Patient Name:  Yahaira Ramirez  Today's Date:  September 18, 2017    The doctor who removed your port or catheter was: Dr. Thompson at [facility]  in:    Interventional Radiology Department  When you get home:    No driving or drinking alcohol until tomorrow. You may still have side effects from   the medicine you received today (you may feel drowsy, unsteady or forgetful).    You should have an adult with you for the first 6 hours at home (radiology patients).    You may go back to your regular diet today.     If you take aspirin or Plavix, you may begin taking it again tomorrow. You may restart all other medicines today. Use pain medicine as directed.    Avoid heavy lifting or the overuse of your shoulder for three days.    Port site and bandage care    Keep the wound clean and dry for three days. Cover it with plastic before taking a shower.     Change the bandage if it gets wet or dirty. Use bacitracin (antibiotic cream) and clean gauze.     After three days, you may use Band-Aids until the wound has healed.    If you have oozing or bleeding from the port site or catheter (tube) site:   o Put direct pressure on the wound for 5 to 10 minutes with a gauze pad.  If you still have  "bleeding after 10 minutes, call your doctor.  o If you are bleeding a lot and can t control it with direct pressure, call 911.    Call your doctor at  Oncology clinic if you have:    Swelling in your neck.    Signs of infection:   o fever over 100  F (37.8 C) under the tongue  o the wound is red, tender or draining.     Additional Information About Your Visit        MyChart Information     [employer] gives you secure access to your electronic health record. If you see a primary care provider, you can also send messages to your care team and make appointments. If you have questions, please call your primary care clinic.  If you do not have a primary care provider, please call [phone] and they will assist you.        Care EveryWhere ID     This is your Care EveryWhere ID. This could be used by other organizations to access your [place] medical records  [id number]        Your Vitals Were     Blood Pressure Pulse Temperature Respirations Height Weight    128/84 (BP Location: Right arm) 91 97.8  F (36.6  C) (Temporal) 15 1.676 m (5' 6\") 115 kg (253 lb 8.5 oz)    Pulse Oximetry BMI (Body Mass Index)                99% 40.92 kg/m2           Primary Care Provider Office Phone # Fax #    Guille Torre [phone]      Equal Access to Services     ESTHER CHAVARRIA : Hadii aad ku hadasho Soomaali, waaxda luqadaha, qaybta kaalmada adeegyada, waxay idiin hayansleyn wei unger. So [facility] [phone].    ATENCIÓN: Si habla español, tiene a strong disposición servicios gratuitos de asistencia lingüística. Llame al [phone].    We comply with applicable federal civil rights laws and [place] laws. We do not discriminate on the basis of race, color, national origin, age, disability sex, sexual orientation or gender identity.            Thank you!     Thank you for choosing [facility] for your care. Our goal is always to provide you with excellent care. Hearing back from our patients is one " way we can continue to improve our services. Please take a few minutes to complete the written survey that you may receive in the mail after you visit. If you would like to speak to someone directly about your visit please contact Patient Relations at [phone]. Thank you!               Medication List: This is a list of all your medications and when to take them. Check marks below indicate your daily home schedule. Keep this list as a reference.      Medications           Morning Afternoon Evening Bedtime As Needed    ALLEGRA PO   Take 1 tablet by mouth every morning                                BENADRYL PO   Take 50 mg by mouth At Bedtime (2 x 25 mg tablet = 50 mg dose)                                CELEXA PO   Take 40 mg by mouth every morning                                FLONASE NA   Apply 2 sprays into each nare daily as needed                                IBUPROFEN PO   Take 800 mg by mouth daily as needed for moderate pain (Headaches)                                levalbuterol 45 MCG/ACT Inhaler   Commonly known as:  XOPENEX HFA   Inhale 1-2 puffs into the lungs every 4 hours as needed for shortness of breath / dyspnea.                                mometasone-formoterol 100-5 MCG/ACT oral inhaler   Commonly known as:  DULERA   Inhale 2 puffs into the lungs 2 times daily                                order for DME   1.) [place] ostomy bags #8578 #60 2.) Barrier strips elastic brava 5.5 C-shape #80 3.) Barrier Ring Brava Modable 48 mm soumya, 2 mm (thin) #60 4.) Sponge 4 x 4 ply non-sterile dermacea non-woven 200/pk- 6 pk.   Please1 fax to [employer] fax# [phone]                                oxyCODONE 5 MG IR tablet   Commonly known as:  ROXICODONE   Take 1 tablet (5 mg) by mouth every 4 hours as needed for moderate to severe pain                                SYNTHROID PO   Take 137 mcg by mouth every morning                                TYLENOL PO   Take 1,000 mg by mouth  daily as needed for mild pain (Headaches)                                WELLBUTRIN SR PO   Take 150 mg by mouth 2 times daily

## 2017-09-18 NOTE — DISCHARGE INSTRUCTIONS
Going Home after the   Removal of Your Port   ______________________________________________________________________    Patient Name:  Yahaira Ramirez  Today's Date:  September 18, 2017    The doctor who removed your port or catheter was: Dr. Thompson at [facility]  in:    Interventional Radiology Department  When you get home:    No driving or drinking alcohol until tomorrow. You may still have side effects from   the medicine you received today (you may feel drowsy, unsteady or forgetful).    You should have an adult with you for the first 6 hours at home (radiology patients).    You may go back to your regular diet today.     If you take aspirin or Plavix, you may begin taking it again tomorrow. You may restart all other medicines today. Use pain medicine as directed.    Avoid heavy lifting or the overuse of your shoulder for three days.    Port site and bandage care    Keep the wound clean and dry for three days. Cover it with plastic before taking a shower.     Change the bandage if it gets wet or dirty. Use bacitracin (antibiotic cream) and clean gauze.     After three days, you may use Band-Aids until the wound has healed.    If you have oozing or bleeding from the port site or catheter (tube) site:   o Put direct pressure on the wound for 5 to 10 minutes with a gauze pad.  If you still have bleeding after 10 minutes, call your doctor.  o If you are bleeding a lot and can t control it with direct pressure, call 911.    Call your doctor at  Oncology clinic if you have:    Swelling in your neck.    Signs of infection:   o fever over 100  F (37.8 C) under the tongue  o the wound is red, tender or draining.

## 2017-09-18 NOTE — IP AVS SNAPSHOT
[facility]    [address]    [facility] [address]    Phone:  [phone]                                       After Visit Summary   9/18/2017    Yahaira Ramirez    MRN: [id number]           After Visit Summary Signature Page     I have received my discharge instructions, and my questions have been answered. I have discussed any challenges I see with this plan with the nurse or doctor.    ..........................................................................................................................................  Patient/Patient Representative Signature      ..........................................................................................................................................  Patient Representative Print Name and Relationship to Patient    ..................................................               ................................................  Date                                            Time    ..........................................................................................................................................  Reviewed by Signature/Title    ...................................................              ..............................................  Date                                                            Time

## 2017-09-27 ENCOUNTER — CARE COORDINATION (OUTPATIENT)
Dept: CARE COORDINATION | Facility: CLINIC | Age: 42
End: 2017-09-27

## 2017-09-27 NOTE — PROGRESS NOTES
Clinic Care Coordination Contact  OUTREACH    Referral Information:  Referral Source: CTS  Reason for Contact: 9/6/2017---- Ileostomy reversal  Care Conference: No     Universal Utilization:   ED Visits in last year: 0  Hospital visits in last year: 5  Last PCP appointment: 03/15/17  Missed Appointments: 0  Concerns: No  Multiple Providers or Specialists:  (Yes)    Clinical Concerns:  Current Medical Concerns: Patient reports she has formed soft stools and she can have 10-15 small stools a day and they can be urgent.  Patient states the surgeon informed her this is normal.  Patient  is eating small meals and using   Imodium as needed .  Patient has an appointment with the Surgeon 10/5 and plans to go back to work 10/9   .  CC encouraged patient to drink plenty of fluids to prevent dehydration and to discuss a plan to use Imodium on a schedule and  Get a nutritionist referral from the surgeon .  Patient agrees with the plan   Patient reports stoma is healing well and will watch for any signs of infection and call surgeon with concerns     Clinical Pathway Name: None  Clinical Pathway: None    Medication Management:  Not discussed today      Functional Status:  Mobility Status: Independent  Equipment Currently Used at Home: none       Psychosocial:  Current living arrangement:: I live in a private home with family       Resources and Interventions:  Current Resources:  (NA);  (NA)  PAS Number:  (NA)  Senior Linkage Line Referral Placed:  (NA)  Advanced Care Plans/Directives on file:: No  Referrals Placed:  (NA)     Goals:   Goal 1 Statement: I will watch for signs of infection   Goal 1 Progression Percent: 90%  Goal 1 Progression Date: 09/27/17  Goal 2 Statement: I will increase my strength   Goal 2 Progression Percent: 50%  Goal 2 Progression Date: 09/27/17  Goal 3 Statement: I will decrease daily stools  Goal 3 Progression Percent: 0%  Goal 3 Progression Date: 09/27/17       Barriers: Frequent  stools  Strengths:Small meals and imodium is working     Frequency of Care Coordination:  (CC will follow up after home care discharge )       Plan: CC will follow up after surgeon appointment 10/5/2017    Kandis Piper RN / Clinical Care Coordinator     [address] [address]  [email or web address]  Office :  [phone] / Fax :  [phone]

## 2017-10-02 ENCOUNTER — TRANSFERRED RECORDS (OUTPATIENT)
Dept: HEALTH INFORMATION MANAGEMENT | Facility: CLINIC | Age: 42
End: 2017-10-02

## 2017-10-05 ENCOUNTER — TRANSFERRED RECORDS (OUTPATIENT)
Dept: HEALTH INFORMATION MANAGEMENT | Facility: CLINIC | Age: 42
End: 2017-10-05

## 2017-10-12 ENCOUNTER — CARE COORDINATION (OUTPATIENT)
Dept: CARE COORDINATION | Facility: CLINIC | Age: 42
End: 2017-10-12

## 2017-10-12 NOTE — LETTER
[facility] Home  Complex Care Plan  About Me  Patient Name:  Yahaira Ramirez    YOB: 1975  Age:   42 year old   [place] MRN: [id number] Telephone Information:     Home Phone [phone]   Mobile [phone]       Address:    [facility] [place] [address] Email address:  [email or web address]      Emergency Contact(s)  Name Relationship Lgl Grd Work Phone Home Phone Mobile Phone   1. CHERRI RAMIREZ Mother   [phone]   2. KSENIA RAMIREZ Father  none [phone]           Primary language:  English     needed? No   [employer]:  [phone] op. 1  Other communication barriers: No  Preferred Method of Communication:  Letter, Luz, Phone  Current living arrangement: I live in a private home with family  Mobility Status/ Medical Equipment: Independent  Other information to know about me:    Health Maintenance  Health Maintenance Reviewed: Not assessed    My Access Plan  Medical Emergency 911   Primary Clinic Line [facility]- [phone]   24 Hour Appointment Line [phone] or  [phone]) (toll-free)   24 Hour Nurse Line [phone] (toll-free)   Preferred Urgent Care [facility], [phone]   Preferred Hospital [facility]  [phone]   Preferred Pharmacy [facility] [place] [facility] - [address]     Behavioral Health Crisis Line The National Suicide Prevention Lifeline at 1-522.164.5003 or 911     My Care Team Members  Patient Care Team       Relationship Specialty Notifications Start End    Guille Torre MD PCP - General   2/15/02     Phone: [phone] Fax: [phone] [address] [facility] [address]    Kandis Piper RN Clinic Care Coordinator  Admissions 4/11/17     Phone: [phone] Fax: [phone]        Yair Adkins MD MD Colon and Rectal Surgery  4/11/17     Phone:  [phone] Fax: [phone]         [facility] [address] [address]         My Care Plans  Self Management and Treatment Plan  Goals and (Comments)  Goal #1: I will watch for signs of infection       90% of goal reached    Goal #2: I will increase my strength  I will walk outside daily      50% of goal reached    Goal #3: I will decrease daily stools    I will eat 6 small meals   0% of goal reached      Action Plans on File: None  Advance Care Plans/Directives Type:   Type Advanced Care Plans/Directives:  (NA)    My Medical and Care Information  Problem List   Patient Active Problem List   Diagnosis     Hypertrophy of breast     CARDIOVASCULAR SCREENING; LDL GOAL LESS THAN 160     Acquired hypothyroidism     Mild intermittent asthma without complication     Major depressive disorder, recurrent episode, mild (H)     Mirena inserted : due for removal 2019 or 2021     Rectal cancer (H)     Morbid obesity due to excess calories (H)     Ileostomy status (H)      Current Medications and Allergies:  See printed Medication Report.    Care Coordination Start Date: 04/11/17   Frequency of Care Coordination:  (CC will follow up after home care discharge )   Form Last Updated: 10/20/2017

## 2017-10-12 NOTE — PROGRESS NOTES
Clinic Care Coordination Contact  [facility]/Voicemail    Referral Source: CTS  Reason for Contact: 9/6/2017---- Ileostomy reversal  Clinical Data: Care Coordinator Outreach  Outreach attempted x 1.  Left message on voicemail with call back information and requested return call.  Plan: . Care Coordinator will await a return call   Kandis Piper RN / Clinical Care Coordinator     [address] [address]  [email or web address]  Office :  [phone] / Fax :  [phone]

## 2017-10-18 ENCOUNTER — MYC MEDICAL ADVICE (OUTPATIENT)
Dept: INTERNAL MEDICINE | Facility: CLINIC | Age: 42
End: 2017-10-18

## 2017-10-20 NOTE — PROGRESS NOTES
No return call. No unmet needs at our last conversation.  Closed to care coordiantion .  Patient has CC contact information for future questions or concerns     Kandis Piper RN / Clinical Care Coordinator     [address] [address]  [email or web address]  Office :  [phone] / Fax :  [phone]

## 2017-10-23 ENCOUNTER — MYC MEDICAL ADVICE (OUTPATIENT)
Dept: INTERNAL MEDICINE | Facility: CLINIC | Age: 42
End: 2017-10-23

## 2017-10-23 DIAGNOSIS — E03.9 ACQUIRED HYPOTHYROIDISM: ICD-10-CM

## 2017-10-23 LAB — TSH SERPL DL<=0.005 MIU/L-ACNC: 2.46 MU/L (ref 0.4–4)

## 2017-10-23 PROCEDURE — 84443 ASSAY THYROID STIM HORMONE: CPT | Performed by: INTERNAL MEDICINE

## 2017-10-23 PROCEDURE — 36415 COLL VENOUS BLD VENIPUNCTURE: CPT | Performed by: INTERNAL MEDICINE

## 2017-10-30 ENCOUNTER — HOSPITAL ENCOUNTER (OUTPATIENT)
Dept: CT IMAGING | Facility: CLINIC | Age: 42
Discharge: HOME | End: 2017-10-30
Attending: NURSE PRACTITIONER | Admitting: NURSE PRACTITIONER
Payer: COMMERCIAL

## 2017-10-30 DIAGNOSIS — C20 RECTAL CANCER (H): ICD-10-CM

## 2017-10-30 PROCEDURE — 25000128 H RX IP 250 OP 636: Performed by: RADIOLOGY

## 2017-10-30 PROCEDURE — 74177 CT ABD & PELVIS W/CONTRAST: CPT

## 2017-10-30 PROCEDURE — 71260 CT THORAX DX C+: CPT

## 2017-10-30 RX ORDER — IOPAMIDOL 755 MG/ML
500 INJECTION, SOLUTION INTRAVASCULAR ONCE
Status: COMPLETED | OUTPATIENT
Start: 2017-10-30 | End: 2017-10-30

## 2017-10-30 RX ADMIN — IOPAMIDOL 100 ML: 755 INJECTION, SOLUTION INTRAVENOUS at 11:10

## 2017-10-30 RX ADMIN — SODIUM CHLORIDE 65 ML: 9 INJECTION, SOLUTION INTRAVENOUS at 11:10

## 2017-11-01 ENCOUNTER — TRANSFERRED RECORDS (OUTPATIENT)
Dept: HEALTH INFORMATION MANAGEMENT | Facility: CLINIC | Age: 42
End: 2017-11-01

## 2017-12-03 ENCOUNTER — MYC MEDICAL ADVICE (OUTPATIENT)
Dept: INTERNAL MEDICINE | Facility: CLINIC | Age: 42
End: 2017-12-03

## 2018-01-02 NOTE — PROGRESS NOTES
Colon and Rectal Surgery Progress Note          Assessment and Plan:   POD #3 robotic LAR with DLI.  T2 N0    Doing well.  Great urine output. Tolerating diet    D/c fluids  Continue lovenox  I discussed path with her    Yair Adkins MD  Colorectal Surgery  [phone] (office)  [phone] (pager)  [email or web address]             Interval History:   Doing better.  Passing gas and stool in bag.  [place] with clear yellow urine              Physical Exam:   Vitals were reviewed  Patient Vitals for the past 24 hrs:   BP Temp Temp src Pulse Heart Rate Resp SpO2   04/08/17 0430 110/61 98.6  F (37  C) Oral - 103 16 95 %   04/08/17 0041 107/62 98.2  F (36.8  C) Oral - 95 16 95 %   04/07/17 2008 - - - - - 16 -   04/07/17 2007 - - - - - 16 - 04/07/17 1538 124/62 98.2  F (36.8  C) Oral 112 109 16 96 %   04/07/17 1500 - - - - - 16 - 04/07/17 1129 119/65 98  F (36.7  C) Oral 103 98 16 93 %   04/07/17 1115 - - - - - 16 -   04/07/17 1042 - - - - - 16 -   04/07/17 0737 130/66 97.9  F (36.6  C) Oral 101 - 16 97 %         Intake/Output Summary (Last 24 hours) at 04/08/17 0729  Last data filed at 04/08/17 0609   Gross per 24 hour   Intake              600 ml   Output             6075 ml   Net            -5475 ml       Head - Nomocephalic atraumatic  Sclera anicteric  Extremities warm and well perfused  Lungs - breathing non labored,   Abdomen - soft, obese, wounds look great  Rectal exam - no rash  Skin - no rash  Psych - affect appropriate  Neuro - no focal deficits             Data:   All laboratory and imaging data in the past 24 hours reviewed    CBC  Lab Results   Component Value Date    WBC 9.2 04/07/2017    WBC 7.5 04/06/2017    WBC 6.3 01/02/2017    HGB 9.3 (L) 04/07/2017    HGB 10.3 (L) 04/06/2017    HGB 11.7 03/15/2017    HCT 27.8 (L) 04/07/2017    HCT 31.5 (L) 04/06/2017    HCT 36.9 01/02/2017     04/08/2017     04/07/2017     04/06/2017       BMP  Recent Labs   Lab Test  04/07/17   0705  04/06/17    0703   NA  134  138   POTASSIUM  3.5  3.8   CHLORIDE  99  104   CO2  27  27   ANIONGAP  8  7   GLC  98  102*   BUN  8  9   CR  0.70  0.73   KATARZYNA  8.4*  8.3*       Liver Function Studies -   Recent Labs   Lab Test  10/28/16   0910   PROTTOTAL  8.0   ALBUMIN  3.9   BILITOTAL  0.4   ALKPHOS  103   AST  9   ALT  17                      Medications:     Current Facility-Administered Medications Ordered in Epic   Medication Dose Route Frequency Last Rate Last Dose     oxyCODONE-acetaminophen (PERCOCET) 5-325 MG per tablet 1-2 tablet  1-2 tablet Oral Q6H PRN   2 tablet at 04/08/17 0713     HYDROmorphone (PF) (DILAUDID) injection 0.3-0.5 mg  0.3-0.5 mg Intravenous Q2H PRN         acetaminophen (TYLENOL) tablet 650 mg  650 mg Oral Q4H PRN   650 mg at 04/07/17 1401     sodium chloride (PF) 0.9% PF flush 10 mL  10 mL Intracatheter Q8H   10 mL at 04/07/17 2024     ibuprofen (ADVIL/MOTRIN) tablet 800 mg  800 mg Oral TID PRN   800 mg at 04/08/17 0431     ketorolac (TORADOL) injection 30 mg  30 mg Intravenous Q6H PRN   30 mg at 04/07/17 1354     potassium phosphate 10 mmol in D5W 250 mL intermittent infusion  10 mmol Intravenous Daily PRN         potassium phosphate 15 mmol in D5W 250 mL intermittent infusion  15 mmol Intravenous Daily PRN   15 mmol at 04/06/17 1124     potassium phosphate 20 mmol in D5W 500 mL intermittent infusion  20 mmol Intravenous Q6H PRN         potassium phosphate 20 mmol in D5W 250 mL intermittent infusion  20 mmol Intravenous Q6H PRN         potassium phosphate 25 mmol in D5W 500 mL intermittent infusion  25 mmol Intravenous Q8H PRN         buPROPion (WELLBUTRIN SR) 12 hr tablet 150 mg  150 mg Oral BID   150 mg at 04/07/17 2022     citalopram (celeXA) tablet 40 mg  40 mg Oral Daily   40 mg at 04/07/17 0903     fluticasone (FLONASE) 50 MCG/ACT spray 2 spray  2 spray Both Nostrils Daily PRN         levalbuterol (XOPENEX HFA) Inhaler 1-2 puff  1-2 puff Inhalation Q4H PRN         levothyroxine  (SYNTHROID/LEVOTHROID) tablet 125 mcg  125 mcg Oral Daily   125 mcg at 04/07/17 0903     mometasone-formoterol (DULERA) 100-5 MCG/ACT oral inhaler 2 puff  2 puff Inhalation BID   2 puff at 04/07/17 2023     lidocaine 1 % 1 mL  1 mL Other Q1H PRN         lidocaine (LMX4) cream   Topical Q1H PRN         sodium chloride (PF) 0.9% PF flush 3 mL  3 mL Intracatheter Q1H PRN         sodium chloride (PF) 0.9% PF flush 3 mL  3 mL Intracatheter Q8H   3 mL at 04/08/17 0047     enoxaparin (LOVENOX) injection 40 mg  40 mg Subcutaneous Q24H   40 mg at 04/07/17 0903     lactated ringers BOLUS 500 mL  500 mL Intravenous Q4H PRN         naloxone (NARCAN) injection 0.1-0.4 mg  0.1-0.4 mg Intravenous Q2 Min PRN         diphenhydrAMINE (BENADRYL) injection 25 mg  25 mg Intravenous Q6H PRN   25 mg at 04/07/17 0208     ondansetron (ZOFRAN) injection 4 mg  4 mg Intravenous Q6H PRN         prochlorperazine (COMPAZINE) injection 5-10 mg  5-10 mg Intravenous Q6H PRN         benzocaine-menthol (CHLORASEPTIC) 6-10 MG lozenge 1-2 lozenge  1-2 lozenge Buccal Q1H PRN         albuterol neb solution 2.5 mg  2.5 mg Nebulization Q6H PRN         [START ON 4/9/2017] levothyroxine (SYNTHROID/LEVOTHROID) half-tab 62.5 mcg  62.5 mcg Oral Once per day on Sun         No current Epic-ordered outpatient prescriptions on file.                none

## 2018-02-12 ENCOUNTER — TRANSFERRED RECORDS (OUTPATIENT)
Dept: HEALTH INFORMATION MANAGEMENT | Facility: CLINIC | Age: 43
End: 2018-02-12

## 2018-02-12 DIAGNOSIS — C20 RECTAL CANCER (H): ICD-10-CM

## 2018-02-12 LAB — HBA1C MFR BLD: 5.1 % (ref 4.3–6)

## 2018-02-12 PROCEDURE — 83036 HEMOGLOBIN GLYCOSYLATED A1C: CPT | Performed by: INTERNAL MEDICINE

## 2018-02-12 PROCEDURE — 36415 COLL VENOUS BLD VENIPUNCTURE: CPT | Performed by: INTERNAL MEDICINE

## 2018-04-05 ENCOUNTER — TELEPHONE (OUTPATIENT)
Dept: OBGYN | Facility: CLINIC | Age: 43
End: 2018-04-05

## 2018-04-05 ENCOUNTER — HOSPITAL ENCOUNTER (OUTPATIENT)
Dept: MAMMOGRAPHY | Facility: CLINIC | Age: 43
Discharge: HOME | End: 2018-04-05
Attending: OBSTETRICS & GYNECOLOGY | Admitting: OBSTETRICS & GYNECOLOGY
Payer: COMMERCIAL

## 2018-04-05 DIAGNOSIS — Z12.31 VISIT FOR SCREENING MAMMOGRAM: ICD-10-CM

## 2018-04-05 DIAGNOSIS — R92.8 ABNORMALITY OF LEFT BREAST ON SCREENING MAMMOGRAM: Primary | ICD-10-CM

## 2018-04-05 DIAGNOSIS — R92.8 ABNORMAL MAMMOGRAM: ICD-10-CM

## 2018-04-05 PROCEDURE — 77063 BREAST TOMOSYNTHESIS BI: CPT

## 2018-04-05 NOTE — TELEPHONE ENCOUNTER
There is a pending order for a breast US that Dr. Aguilar placed. The order needs to be signed as the pt is trying to schedule the ultrasound appointment.    Please sign.    Shayla Rouse CMA

## 2018-04-05 NOTE — PROGRESS NOTES
Please contact the patient and assist her in scheduling the additional views that the radiologist has requested thank you  Thanks  Shawn Lacy M.D.

## 2018-04-06 ENCOUNTER — HOSPITAL ENCOUNTER (OUTPATIENT)
Dept: ULTRASOUND IMAGING | Facility: CLINIC | Age: 43
Discharge: HOME | End: 2018-04-06
Attending: FAMILY MEDICINE | Admitting: FAMILY MEDICINE
Payer: COMMERCIAL

## 2018-04-06 DIAGNOSIS — R92.8 ABNORMALITY OF LEFT BREAST ON SCREENING MAMMOGRAM: ICD-10-CM

## 2018-04-06 PROCEDURE — 76642 ULTRASOUND BREAST LIMITED: CPT | Mod: LT

## 2018-04-09 ENCOUNTER — HOSPITAL ENCOUNTER (OUTPATIENT)
Facility: CLINIC | Age: 43
Discharge: HOME | End: 2018-04-09
Attending: COLON & RECTAL SURGERY | Admitting: COLON & RECTAL SURGERY
Payer: COMMERCIAL

## 2018-04-09 ENCOUNTER — SURGERY (OUTPATIENT)
Age: 43
End: 2018-04-09

## 2018-04-09 VITALS
OXYGEN SATURATION: 98 % | BODY MASS INDEX: 39.86 KG/M2 | SYSTOLIC BLOOD PRESSURE: 124 MMHG | DIASTOLIC BLOOD PRESSURE: 78 MMHG | RESPIRATION RATE: 19 BRPM | HEIGHT: 66 IN | WEIGHT: 248 LBS

## 2018-04-09 DIAGNOSIS — R15.2 INCONTINENCE OF FECES WITH FECAL URGENCY: ICD-10-CM

## 2018-04-09 DIAGNOSIS — Z90.49 HISTORY OF LOW ANTERIOR RESECTION OF RECTUM: Primary | ICD-10-CM

## 2018-04-09 DIAGNOSIS — R15.9 INCONTINENCE OF FECES WITH FECAL URGENCY: ICD-10-CM

## 2018-04-09 LAB — COLONOSCOPY: NORMAL

## 2018-04-09 PROCEDURE — G0500 MOD SEDAT ENDO SERVICE >5YRS: HCPCS | Performed by: COLON & RECTAL SURGERY

## 2018-04-09 PROCEDURE — 25000128 H RX IP 250 OP 636: Performed by: COLON & RECTAL SURGERY

## 2018-04-09 PROCEDURE — 88305 TISSUE EXAM BY PATHOLOGIST: CPT | Performed by: COLON & RECTAL SURGERY

## 2018-04-09 PROCEDURE — 88305 TISSUE EXAM BY PATHOLOGIST: CPT | Mod: 26 | Performed by: COLON & RECTAL SURGERY

## 2018-04-09 PROCEDURE — 45385 COLONOSCOPY W/LESION REMOVAL: CPT | Performed by: COLON & RECTAL SURGERY

## 2018-04-09 RX ORDER — FENTANYL CITRATE 50 UG/ML
INJECTION, SOLUTION INTRAMUSCULAR; INTRAVENOUS PRN
Status: DISCONTINUED | OUTPATIENT
Start: 2018-04-09 | End: 2018-04-09 | Stop reason: HOSPADM

## 2018-04-09 RX ORDER — LIDOCAINE 40 MG/G
CREAM TOPICAL
Status: DISCONTINUED | OUTPATIENT
Start: 2018-04-09 | End: 2018-04-09 | Stop reason: HOSPADM

## 2018-04-09 RX ORDER — DIPHENHYDRAMINE HYDROCHLORIDE 50 MG/ML
25 INJECTION INTRAMUSCULAR; INTRAVENOUS EVERY 4 HOURS PRN
Status: DISCONTINUED | OUTPATIENT
Start: 2018-04-09 | End: 2018-04-09 | Stop reason: HOSPADM

## 2018-04-09 RX ORDER — NALOXONE HYDROCHLORIDE 0.4 MG/ML
.1-.4 INJECTION, SOLUTION INTRAMUSCULAR; INTRAVENOUS; SUBCUTANEOUS
Status: DISCONTINUED | OUTPATIENT
Start: 2018-04-09 | End: 2018-04-09 | Stop reason: HOSPADM

## 2018-04-09 RX ORDER — ONDANSETRON 2 MG/ML
4 INJECTION INTRAMUSCULAR; INTRAVENOUS EVERY 6 HOURS PRN
Status: DISCONTINUED | OUTPATIENT
Start: 2018-04-09 | End: 2018-04-09 | Stop reason: HOSPADM

## 2018-04-09 RX ORDER — LACTOBACIL 2/BIFIDO 1/S.THERMO 450B CELL
1 PACKET (EA) ORAL DAILY
Qty: 30 EACH | Refills: 1 | Status: SHIPPED | OUTPATIENT
Start: 2018-04-09 | End: 2018-07-09

## 2018-04-09 RX ORDER — ONDANSETRON 2 MG/ML
4 INJECTION INTRAMUSCULAR; INTRAVENOUS
Status: DISCONTINUED | OUTPATIENT
Start: 2018-04-09 | End: 2018-04-09 | Stop reason: HOSPADM

## 2018-04-09 RX ORDER — DIPHENHYDRAMINE HCL 25 MG
25 CAPSULE ORAL EVERY 4 HOURS PRN
Status: DISCONTINUED | OUTPATIENT
Start: 2018-04-09 | End: 2018-04-09 | Stop reason: HOSPADM

## 2018-04-09 RX ORDER — FLUMAZENIL 0.1 MG/ML
0.2 INJECTION, SOLUTION INTRAVENOUS
Status: DISCONTINUED | OUTPATIENT
Start: 2018-04-09 | End: 2018-04-09 | Stop reason: HOSPADM

## 2018-04-09 RX ORDER — ONDANSETRON 4 MG/1
4 TABLET, ORALLY DISINTEGRATING ORAL EVERY 6 HOURS PRN
Status: DISCONTINUED | OUTPATIENT
Start: 2018-04-09 | End: 2018-04-09 | Stop reason: HOSPADM

## 2018-04-09 RX ORDER — PROCHLORPERAZINE MALEATE 10 MG
10 TABLET ORAL EVERY 6 HOURS PRN
Status: DISCONTINUED | OUTPATIENT
Start: 2018-04-09 | End: 2018-04-09 | Stop reason: HOSPADM

## 2018-04-09 RX ADMIN — MIDAZOLAM 2 MG: 1 INJECTION INTRAMUSCULAR; INTRAVENOUS at 10:13

## 2018-04-09 RX ADMIN — FENTANYL CITRATE 100 MCG: 50 INJECTION, SOLUTION INTRAMUSCULAR; INTRAVENOUS at 10:13

## 2018-04-09 NOTE — H&P
Pre-Endoscopy History and Physical     Yahaira Ramirez MRN# [id number]   YOB: 1975 Age: 42 year old     Date of Procedure: 4/9/2018  Primary care provider: Guille Torre  Type of Endoscopy: colonoscopy  Reason for Procedure: surveillane  Type of Anesthesia Anticipated: Moderate Sedation    HPI:    Yahaira is a 42 year old female who will be undergoing the above procedure.      A history and physical has been performed. The patient's medications and allergies have been reviewed. The risks and benefits of the procedure including the risk of bleeding, perforation, and missed lesions as well as the sedation options and risks were discussed with the patient.  All questions were answered and informed consent was obtained.      Allergies   Allergen Reactions     Cats      Pollen Extract      Sulfa Drugs Hives     Adhesive Tape Itching and Rash        No current facility-administered medications for this encounter.        Prescriptions Prior to Admission   Medication Sig Dispense Refill Last Dose     BuPROPion HCl (WELLBUTRIN SR PO) Take 150 mg by mouth 2 times daily   4/9/2018     Citalopram Hydrobromide (CELEXA PO) Take 40 mg by mouth every morning   4/9/2018     Levothyroxine Sodium (SYNTHROID PO) Take 137 mcg by mouth every morning   4/9/2018     Acetaminophen (TYLENOL PO) Take 1,000 mg by mouth daily as needed for mild pain (Headaches)   4/8/2018     oxyCODONE (ROXICODONE) 5 MG IR tablet Take 1 tablet (5 mg) by mouth every 4 hours as needed for moderate to severe pain 40 tablet 0 Past Month at Unknown time     Fluticasone Propionate (FLONASE NA) Apply 2 sprays into each nare daily as needed   Unknown     Fexofenadine HCl (ALLEGRA PO) Take 1 tablet by mouth every morning   Unknown     order for DME 1.) [place] ostomy bags #8578 #60  2.) Barrier strips elastic brava 5.5 C-shape #80  3.) Barrier Ring Brava Modable 48 mm soumya, 2 mm (thin) #60  4.) Sponge 4 x 4 ply non-sterile dermacea non-woven 200/pk- 6  pk.     Please1 fax to [employer] fax# [phone] 1 Units 0 Taking     mometasone-formoterol (DULERA) 100-5 MCG/ACT oral inhaler Inhale 2 puffs into the lungs 2 times daily 1 Inhaler 11 More than a month     IBUPROFEN PO Take 800 mg by mouth daily as needed for moderate pain (Headaches)   Unknown     DiphenhydrAMINE HCl (BENADRYL PO) Take 50 mg by mouth At Bedtime (2 x 25 mg tablet = 50 mg dose)    9/17/2017 at Unknown time     levalbuterol (XOPENEX HFA) 45 MCG/ACT inhaler Inhale 1-2 puffs into the lungs every 4 hours as needed for shortness of breath / dyspnea. 1 Inhaler 3 More than a month       Patient Active Problem List   Diagnosis     Hypertrophy of breast     CARDIOVASCULAR SCREENING; LDL GOAL LESS THAN 160     Acquired hypothyroidism     Mild intermittent asthma without complication     Major depressive disorder, recurrent episode, mild (H)     Mirena inserted : due for removal 2019 or 2021     Rectal cancer (H)     Morbid obesity due to excess calories (H)     Ileostomy status (H)        Past Medical History:   Diagnosis Date     ASCUS on Pap smear 1/23/03    + HPV     Asthma      ASTHMA - MILD INTERMITTENT 8/23/2005     Calculus of gallbladder without mention of cholecystitis or obstruction      CARDIOVASCULAR SCREENING; LDL GOAL LESS THAN 160 10/31/2010     Colon cancer (H)     chemo and radiation therapy started january 9 until february 11, 2017     Depressive disorder 2/22/2011     History of colposcopy with cervical biopsy 2/20/03, 7/23/03    SIMONE I & III, SIMONE I     HUMAN PAPILLOMAVIRUS NOS 3/24/2003    61-low risk     HYPOTHYROIDISM NOS 9/10/2002     Mild major depression (H) 2/22/2011     Other postablative hypothyroidism     iatrogenically induced hypothyroidism after I-31 for hyperthyroidism   abstracted [id number]     Rectal cancer (H) 4/5/2017     Renal stones     HX of        Past Surgical History:   Procedure Laterality Date     ABDOMEN SURGERY  4/5/17    Colon resection     C IUD,MIRENA  " 2016     CHOLECYSTECTOMY       COLONOSCOPY Left 2016    Procedure: COMBINED COLONOSCOPY, SINGLE OR MULTIPLE BIOPSY/POLYPECTOMY BY BIOPSY;  Surgeon: Claudette De La Paz MD;  Location:  GI     COLONOSCOPY N/A 2017    Procedure: COLONOSCOPY;  COLONOSCOPY ;  Surgeon: Yair Adkins MD;  Location: [facility]     DAVINCI COLECTOMY N/A 2017    Procedure: DAVINCI XI COLECTOMY;  Surgeon: Yair Adkins MD;  Location:  OR     ENT SURGERY  2016    Tonsilectomy     EXCISE MASS UPPER EXTREMITY  2012    Procedure: EXCISE MASS UPPER EXTREMITY;  Excision Subcutaneous Mass Right Tricep;  Surgeon: Mehul Maldonado MD;  Location:  OR     EYE SURGERY      Lasik     HC CAUTERY OF CERVIX, CRYOCAUTERY  3/23/03     MAMMOPLASTY REDUCTION BILATERAL  7/3/2014    Procedure: MAMMOPLASTY REDUCTION BILATERAL;  Surgeon: Yoanna Mccabe MD;  Location: [facility]     SIGMOIDOSCOPY FLEXIBLE N/A 3/21/2017    Procedure: SIGMOIDOSCOPY FLEXIBLE;  Surgeon: Yair Adkins MD;  Location: [facility]     TAKEDOWN ILEOSTOMY N/A 2017    Procedure: TAKEDOWN ILEOSTOMY;  OPEN ILEOSTOMY REVERSAL ;  Surgeon: Yair Adkins MD;  Location:  OR       Social History   Substance Use Topics     Smoking status: Former Smoker     Packs/day: 1.00     Years: 13.00     Types: Cigarettes     Quit date: 2006     Smokeless tobacco: Never Used     Alcohol use No      Comment: rare       Family History   Problem Relation Age of Onset     CANCER Maternal Grandfather      -lung     CANCER Paternal Grandfather           Hypertension Father      DIABETES Father 60     type 2     Lipids Maternal Grandmother      CANCER Paternal Grandmother      pancreatic     Hypertension Mother      Colon Cancer No family hx of          PHYSICAL EXAM:   /81  Resp 14  Ht 1.676 m (5' 6\")  Wt 112.5 kg (248 lb)  SpO2 97%  BMI 40.03 kg/m2 Estimated body mass index is 40.03 kg/(m^2) as calculated from the following:    Height as of " "this encounter: 1.676 m (5' 6\").    Weight as of this encounter: 112.5 kg (248 lb).   Mental status - alert and oriented  RESP: lungs clear  CV: RRR  AIRWAY EXAM: Mallampatti Class II (visualization of the soft palate, fauces, and uvula)    IMPRESSION   ASA Class 2 - Mild systemic disease      Signed Electronically by: Yair Adkins  April 9, 2018    Colorectal Surgery  [phone] (office)  [phone] (pager)  [email or web address]          "

## 2018-04-10 LAB — COPATH REPORT: NORMAL

## 2018-05-14 ENCOUNTER — TRANSFERRED RECORDS (OUTPATIENT)
Dept: HEALTH INFORMATION MANAGEMENT | Facility: CLINIC | Age: 43
End: 2018-05-14

## 2018-05-21 ENCOUNTER — OFFICE VISIT (OUTPATIENT)
Dept: INTERNAL MEDICINE | Facility: CLINIC | Age: 43
End: 2018-05-21
Payer: COMMERCIAL

## 2018-05-21 VITALS
DIASTOLIC BLOOD PRESSURE: 80 MMHG | WEIGHT: 244.4 LBS | RESPIRATION RATE: 15 BRPM | HEIGHT: 66 IN | BODY MASS INDEX: 39.28 KG/M2 | TEMPERATURE: 98 F | HEART RATE: 97 BPM | OXYGEN SATURATION: 99 % | SYSTOLIC BLOOD PRESSURE: 118 MMHG

## 2018-05-21 DIAGNOSIS — Z13.6 CARDIOVASCULAR SCREENING; LDL GOAL LESS THAN 160: ICD-10-CM

## 2018-05-21 DIAGNOSIS — E03.9 ACQUIRED HYPOTHYROIDISM: ICD-10-CM

## 2018-05-21 DIAGNOSIS — F33.0 MAJOR DEPRESSIVE DISORDER, RECURRENT EPISODE, MILD (H): Primary | ICD-10-CM

## 2018-05-21 DIAGNOSIS — J45.20 MILD INTERMITTENT ASTHMA WITHOUT COMPLICATION: ICD-10-CM

## 2018-05-21 DIAGNOSIS — C20 RECTAL CANCER (H): ICD-10-CM

## 2018-05-21 PROCEDURE — 99214 OFFICE O/P EST MOD 30 MIN: CPT | Performed by: INTERNAL MEDICINE

## 2018-05-21 RX ORDER — BUPROPION HYDROCHLORIDE 150 MG/1
150 TABLET, EXTENDED RELEASE ORAL 2 TIMES DAILY
Qty: 180 TABLET | Refills: 3 | Status: SHIPPED | OUTPATIENT
Start: 2018-05-21 | End: 2019-04-10

## 2018-05-21 RX ORDER — LEVOTHYROXINE SODIUM 137 UG/1
137 TABLET ORAL EVERY MORNING
Qty: 90 TABLET | Refills: 3 | Status: SHIPPED | OUTPATIENT
Start: 2018-05-21

## 2018-05-21 RX ORDER — CITALOPRAM HYDROBROMIDE 40 MG/1
40 TABLET ORAL EVERY MORNING
Qty: 90 TABLET | Refills: 3 | Status: SHIPPED | OUTPATIENT
Start: 2018-05-21 | End: 2019-04-10

## 2018-05-21 ASSESSMENT — PAIN SCALES - GENERAL: PAINLEVEL: NO PAIN (0)

## 2018-05-21 NOTE — PROGRESS NOTES
SUBJECTIVE:   Yahaira Ramirez is a 43 year old female who presents to clinic today for the following health issues:      Depression Followup    Status since last visit: Stable     See PHQ-9 for current symptoms.  Other associated symptoms: None    Complicating factors:   Significant life event:  Yes-  Cancer treatment    Current substance abuse:  None  Anxiety or Panic symptoms:  Yes- panic attack in hospital     PHQ-9 3/15/2017 3/27/2017 4/18/2017   Total Score 2 1 1   Q9: Suicide Ideation Not at all Not at all Not at all       PHQ-9  English  PHQ-9   Any Language  Suicide Assessment Five-step Evaluation and Treatment (SAFE-T)  Hypothyroidism Follow-up      Since last visit, patient describes the following symptoms: Weight stable, no hair loss, no skin changes, no constipation, no loose stools    Asthma Follow-Up    Was ACT completed today?    Yes    ACT Total Scores 4/18/2017   ACT TOTAL SCORE -   ASTHMA ER VISITS -   ASTHMA HOSPITALIZATIONS -   ACT TOTAL SCORE (Goal Greater than or Equal to 20) 24   In the past 12 months, how many times did you visit the emergency room for your asthma without being admitted to the hospital? 0   In the past 12 months, how many times were you hospitalized overnight because of your asthma? 0       Recent asthma triggers that patient is dealing with: None      Amount of exercise or physical activity: Walking more frequently- 7,000 steps daily     Problems taking medications regularly: No    Medication side effects: none    Diet: weight watchers diet       Problem list and histories reviewed & adjusted, as indicated.  Additional history: as documented    Patient Active Problem List   Diagnosis     Hypertrophy of breast     CARDIOVASCULAR SCREENING; LDL GOAL LESS THAN 160     Acquired hypothyroidism     Mild intermittent asthma without complication     Major depressive disorder, recurrent episode, mild (H)     Mirena inserted : due for removal 2019 or 2021     Rectal cancer (H)      Morbid obesity due to excess calories (H)     Ileostomy status (H)     BMI 39.0-39.9,adult     Past Surgical History:   Procedure Laterality Date     ABDOMEN SURGERY  17    Colon resection     C IUD,MIRENA  2016     CHOLECYSTECTOMY       COLONOSCOPY Left 2016    Procedure: COMBINED COLONOSCOPY, SINGLE OR MULTIPLE BIOPSY/POLYPECTOMY BY BIOPSY;  Surgeon: Claudette De La Paz MD;  Location:  GI     COLONOSCOPY N/A 2017    Procedure: COLONOSCOPY;  COLONOSCOPY ;  Surgeon: Yair Adkins MD;  Location:  GI     DAVINCI COLECTOMY N/A 2017    Procedure: DAVINCI XI COLECTOMY;  Surgeon: Yair Adkins MD;  Location:  OR     ENT SURGERY  2016    Tonsilectomy     EXCISE MASS UPPER EXTREMITY  2012    Procedure: EXCISE MASS UPPER EXTREMITY;  Excision Subcutaneous Mass Right Tricep;  Surgeon: Mehul Maldonado MD;  Location:  OR     EYE SURGERY      Lasik     HC CAUTERY OF CERVIX, CRYOCAUTERY  3/23/03     MAMMOPLASTY REDUCTION BILATERAL  7/3/2014    Procedure: MAMMOPLASTY REDUCTION BILATERAL;  Surgeon: Yoanna Mccabe MD;  Location:  [facility]     SIGMOIDOSCOPY FLEXIBLE N/A 3/21/2017    Procedure: SIGMOIDOSCOPY FLEXIBLE;  Surgeon: Yair Adkins MD;  Location: [facility]     TAKEDOWN ILEOSTOMY N/A 2017    Procedure: TAKEDOWN ILEOSTOMY;  OPEN ILEOSTOMY REVERSAL ;  Surgeon: Yair Adkins MD;  Location:  OR       Social History   Substance Use Topics     Smoking status: Former Smoker     Packs/day: 1.00     Years: 13.00     Types: Cigarettes     Quit date: 2006     Smokeless tobacco: Never Used     Alcohol use No      Comment: rare     Family History   Problem Relation Age of Onset     CANCER Maternal Grandfather      -lung     CANCER Paternal Grandfather           Hypertension Father      DIABETES Father 60     type 2     Lipids Maternal Grandmother      CANCER Paternal Grandmother      pancreatic     Hypertension Mother      Colon Cancer No family hx of           Current Outpatient Prescriptions   Medication Sig Dispense Refill     Acetaminophen (TYLENOL PO) Take 1,000 mg by mouth daily as needed for mild pain (Headaches)       BuPROPion HCl (WELLBUTRIN SR PO) Take 150 mg by mouth 2 times daily       Citalopram Hydrobromide (CELEXA PO) Take 40 mg by mouth every morning       Dietary Management Product (VSL#3) PACK Take 1 packet by mouth daily 30 each 1     DiphenhydrAMINE HCl (BENADRYL PO) Take 50 mg by mouth At Bedtime (2 x 25 mg tablet = 50 mg dose)        Fexofenadine HCl (ALLEGRA PO) Take 1 tablet by mouth every morning       Fluticasone Propionate (FLONASE NA) Apply 2 sprays into each nare daily as needed       IBUPROFEN PO Take 800 mg by mouth daily as needed for moderate pain (Headaches)       levalbuterol (XOPENEX HFA) 45 MCG/ACT inhaler Inhale 1-2 puffs into the lungs every 4 hours as needed for shortness of breath / dyspnea. 1 Inhaler 3     Levothyroxine Sodium (SYNTHROID PO) Take 137 mcg by mouth every morning       mometasone-formoterol (DULERA) 100-5 MCG/ACT oral inhaler Inhale 2 puffs into the lungs 2 times daily 1 Inhaler 11     order for DME 1.) [place] ostomy bags #8578 #60  2.) Barrier strips elastic brava 5.5 C-shape #80  3.) Barrier Ring Brava Modable 48 mm soumya, 2 mm (thin) #60  4.) Sponge 4 x 4 ply non-sterile dermacea non-woven 200/pk- 6 pk.     Please1 fax to [employer] fax# [phone] 1 Units 0     oxyCODONE (ROXICODONE) 5 MG IR tablet Take 1 tablet (5 mg) by mouth every 4 hours as needed for moderate to severe pain 40 tablet 0     Allergies   Allergen Reactions     Cats      Pollen Extract      Sulfa Drugs Hives     Adhesive Tape Itching and Rash     BP Readings from Last 3 Encounters:   04/09/18 124/78   09/18/17 139/89   09/08/17 117/80    Wt Readings from Last 3 Encounters:   04/09/18 248 lb (112.5 kg)   09/18/17 253 lb 8.5 oz (115 kg)   09/06/17 258 lb 1.6 oz (117.1 kg)                    Reviewed and updated as needed this  "visit by clinical staff  Tobacco  Allergies  Meds  Med Hx  Surg Hx  Fam Hx  Soc Hx      Reviewed and updated as needed this visit by Provider         ROS:  CONSTITUTIONAL: NEGATIVE for fever, chills, change in weight  ENT/MOUTH: NEGATIVE for ear, mouth and throat problems  RESP: NEGATIVE for significant cough or SOB  CV: NEGATIVE for chest pain, palpitations or peripheral edema  GI: NEGATIVE for nausea, abdominal pain, heartburn, or change in bowel habits  : NEGATIVE for frequency, dysuria, or hematuria  MUSCULOSKELETAL: NEGATIVE for significant arthralgias or myalgia  NEURO: NEGATIVE for weakness, dizziness or paresthesias  ENDOCRINE: NEGATIVE for temperature intolerance, skin/hair changes  HEME: NEGATIVE for bleeding problems  PSYCHIATRIC: NEGATIVE for changes in mood or affect    OBJECTIVE:                                                    /80  Pulse 97  Temp 98  F (36.7  C) (Oral)  Resp 15  Ht 5' 6\" (1.676 m)  Wt 244 lb 6.4 oz (110.9 kg)  SpO2 99%  BMI 39.45 kg/m2  Body mass index is 39.45 kg/(m^2).  GENERAL: alert and no distress  EYES: Eyes grossly normal to inspection, extraocular movements - intact, and PERRL  HENT: ear canals- normal; TMs- normal; Nose- normal; Mouth- no ulcers, no lesions  NECK: no tenderness, no adenopathy, no asymmetry, no masses, no stiffness; thyroid- normal to palpation  RESP: lungs clear to auscultation - no rales, no rhonchi, no wheezes  CV: regular rates and rhythm, normal S1 S2, no S3 or S4 and no murmur, no click or rub -  MS: extremities- no gross deformities noted, no edema  NEURO: strength and tone- normal, sensory exam- grossly normal, mentation- intact, speech- normal, reflexes- symmetric  PSYCH: Alert and oriented times 3; speech- coherent , normal rate and volume; able to articulate logical thoughts, able to abstract reason, no tangential thoughts, no hallucinations or delusions, affect- normal     ASSESSMENT/PLAN:                                      "                 (F33.0) Major depressive disorder, recurrent episode, mild (H)  (primary encounter diagnosis) stable per PHQ 9 score  Comment:   Plan: buPROPion (WELLBUTRIN SR) 150 MG 12 hr tablet,         citalopram (CELEXA) 40 MG tablet            (C20) Rectal cancer (H)  Comment: Followed per oncology status post recent colonoscopy with removal of polyp  Plan:     (J45.20) Mild intermittent asthma without complication  Comment: Stable per ACT score 25  Plan: mometasone-formoterol (DULERA) 100-5 MCG/ACT         oral inhaler        Patient was advised that she should continue using her Dulera    (E03.9) Acquired hypothyroidism  Comment: Labs stable per recent check due October 2018 orders placed  Plan: levothyroxine (SYNTHROID) 137 MCG tablet, TSH         with free T4 reflex            (Z13.6) CARDIOVASCULAR SCREENING; LDL GOAL LESS THAN 160  Comment: Lipids discussed and followed per her gynecologist  Plan:     (Z68.39) BMI 39.0-39.9,adult  Comment: Advised ongoing and continued weight loss  Plan:       See Patient Instructions    Guille Torre MD  [facility]    THE MEDICATION LIST HAS BEEN FULLY RECONCILED BY THE [facility] AND THE NURSING STAFF.    25 minutes spent with this patient, face to face, discussing treatment options for listed problems above as well as side effects of appropriate medications.  Counseling time extended beyond 50% of the clinic visit.  Medication dosing, treatment plan and follow-up were discussed. Also reviewed need for primary care testing for patient.

## 2018-05-21 NOTE — MR AVS SNAPSHOT
After Visit Summary   5/21/2018    Yahaira Ramirez    MRN: [id number]           Patient Information     Date Of Birth          1975        Visit Information        Provider Department      5/21/2018 10:20 AM Guille Torre MD [facility]        Today's Diagnoses     Major depressive disorder, recurrent episode, mild (H)    -  1    Rectal cancer (H)        Mild intermittent asthma without complication        Acquired hypothyroidism           Follow-ups after your visit        Your next 10 appointments already scheduled     Jun 18, 2018 10:30 AM CDT   [facility]    [address]  [facility] [id number] [phone]           Bring a current list of meds and any records pertaining to this visit. For Physicals, please bring immunization records and any forms needing to be filled out. Please arrive 10 minutes early to complete paperwork.              Future tests that were ordered for you today     Open Future Orders        Priority Expected Expires Ordered    TSH with free T4 reflex Routine 10/1/2018 10/31/2018 5/21/2018            Who to contact     If you have questions or need follow up information about today's clinic visit or your schedule please contact [facility] directly at [phone].  Normal or non-critical lab and imaging results will be communicated to you by MyChart, letter or phone within 4 business days after the clinic has received the results. If you do not hear from us within 7 days, please contact the clinic through MyChart or phone. If you have a critical or abnormal lab result, we will notify you by phone as soon as possible.  Submit refill requests through [employer] or call your pharmacy and they will forward the refill request to us. Please allow 3 business days for your refill to be completed.          Additional Information  "About Your Visit        [employer] Information     [email or web address] gives you secure access to your electronic health record. If you see a primary care provider, you can also send messages to your care team and make appointments. If you have questions, please call your primary care clinic.  If you do not have a primary care provider, please call [phone] and they will assist you.        Care EveryWhere ID     This is your Care EveryWhere ID. This could be used by other organizations to access your [facility] medical records  [id number]        Your Vitals Were     Pulse Temperature Respirations Height Pulse Oximetry BMI (Body Mass Index)    97 98  F (36.7  C) (Oral) 15 5' 6\" (1.676 m) 99% 39.45 kg/m2       Blood Pressure from Last 3 Encounters:   05/21/18 118/80   04/09/18 124/78   09/18/17 139/89    Weight from Last 3 Encounters:   05/21/18 244 lb 6.4 oz (110.9 kg)   04/09/18 248 lb (112.5 kg)   09/18/17 253 lb 8.5 oz (115 kg)              We Performed the Following     DEPRESSION ACTION PLAN (DAP)          Today's Medication Changes          These changes are accurate as of 5/21/18 10:48 AM.  If you have any questions, ask your nurse or doctor.               These medicines have changed or have updated prescriptions.        Dose/Directions    buPROPion 150 MG 12 hr tablet   Commonly known as:  WELLBUTRIN SR   This may have changed:  medication strength   Used for:  Major depressive disorder, recurrent episode, mild (H)   Changed by:  Guille Torre MD        Dose:  150 mg   Take 1 tablet (150 mg) by mouth 2 times daily   Quantity:  180 tablet   Refills:  3       citalopram 40 MG tablet   Commonly known as:  celeXA   This may have changed:  medication strength   Used for:  Major depressive disorder, recurrent episode, mild (H)   Changed by:  Guille Torre MD        Dose:  40 mg   Take 1 tablet (40 mg) by mouth every morning   Quantity:  90 tablet   Refills:  3       levothyroxine 137 MCG tablet   Commonly known as:  " SYNTHROID   This may have changed:  medication strength   Used for:  Acquired hypothyroidism   Changed by:  Guille Torre MD        Dose:  137 mcg   Take 1 tablet (137 mcg) by mouth every morning   Quantity:  90 tablet   Refills:  3            Where to get your medicines      These medications were sent to [id number] - [place], [place] - [address]  [address], [place] [address]     Phone:  [phone]     buPROPion 150 MG 12 hr tablet    citalopram 40 MG tablet    levothyroxine 137 MCG tablet    mometasone-formoterol 100-5 MCG/ACT oral inhaler                Primary Care Provider Office Phone # Fax #    Guille Torre [phone]       [address] [place] [phone]        Equal Access to Services     [facility]: Hadii amos perez hadasho Soomaali, waaxda luqadaha, qaybta kaalmada adeegyada, waxay kirain hayernestine plasencia . So [facility] [phone].    ATENCIÓN: Si habla español, tiene a strong disposición servicios gratuitos de asistencia lingüística. [facility] [phone].    We comply with applicable federal civil rights laws and [place] laws. We do not discriminate on the basis of race, color, national origin, age, disability, sex, sexual orientation, or gender identity.            Thank you!     Thank you for choosing [facility]  for your care. Our goal is always to provide you with excellent care. Hearing back from our patients is one way we can continue to improve our services. Please take a few minutes to complete the written survey that you may receive in the mail after your visit with us. Thank you!             Your Updated Medication List - Protect others around you: Learn how to safely use, store and throw away your medicines at www.disposemymeds.org.          This list is accurate as of 5/21/18 10:48 AM.  Always use your most recent med list.                   Brand Name Dispense Instructions for use Diagnosis     buPROPion 150 MG 12 hr tablet    WELLBUTRIN SR    180 tablet    Take 1 tablet (150 mg) by mouth 2 times daily    Major depressive disorder, recurrent episode, mild (H)       citalopram 40 MG tablet    celeXA    90 tablet    Take 1 tablet (40 mg) by mouth every morning    Major depressive disorder, recurrent episode, mild (H)       levalbuterol 45 MCG/ACT Inhaler    XOPENEX HFA    1 Inhaler    Inhale 1-2 puffs into the lungs every 4 hours as needed for shortness of breath / dyspnea.        levothyroxine 137 MCG tablet    SYNTHROID    90 tablet    Take 1 tablet (137 mcg) by mouth every morning    Acquired hypothyroidism       mometasone-formoterol 100-5 MCG/ACT oral inhaler    DULERA    1 Inhaler    Inhale 2 puffs into the lungs 2 times daily    Mild intermittent asthma without complication       VSL#3 Pack     30 each    Take 1 packet by mouth daily    History of low anterior resection of rectum, Incontinence of feces with fecal urgency

## 2018-05-21 NOTE — LETTER
My Depression Action Plan  Name: Yahaira Ramirez   Date of Birth 1975  Date: 5/21/2018    My doctor: Guille Torre   My clinic: [address] [address] [phone]          GREEN    ZONE   Good Control    What it looks like:     Things are going generally well. You have normal up s and down s. You may even feel depressed from time to time, but bad moods usually last less than a day.   What you need to do:  1. Continue to care for yourself (see self care plan)  2. Check your depression survival kit and update it as needed  3. Follow your physician s recommendations including any medication.  4. Do not stop taking medication unless you consult with your physician first.           YELLOW         ZONE Getting Worse    What it looks like:     Depression is starting to interfere with your life.     It may be hard to get out of bed; you may be starting to isolate yourself from others.    Symptoms of depression are starting to last most all day and this has happened for several days.     You may have suicidal thoughts but they are not constant.   What you need to do:     1. Call your care team, your response to treatment will improve if you keep your care team informed of your progress. Yellow periods are signs an adjustment may need to be made.     2. Continue your self-care, even if you have to fake it!    3. Talk to someone in your support network    4. Open up your depression survival kit           RED    ZONE Medical Alert - Get Help    What it looks like:     Depression is seriously interfering with your life.     You may experience these or other symptoms: You can t get out of bed most days, can t work or engage in other necessary activities, you have trouble taking care of basic hygiene, or basic responsibilities, thoughts of suicide or death that will not go away, self-injurious behavior.     What you need to do:  1. Call your care  team and request a same-day appointment. If they are not available (weekends or after hours) call your local crisis line, emergency room or 911.            Depression Self Care Plan / Survival Kit    Self-Care for Depression  Here s the deal. Your body and mind are really not as separate as most people think.  What you do and think affects how you feel and how you feel influences what you do and think. This means if you do things that people who feel good do, it will help you feel better.  Sometimes this is all it takes.  There is also a place for medication and therapy depending on how severe your depression is, so be sure to consult with your medical provider and/ or Behavioral Health Consultant if your symptoms are worsening or not improving.     In order to better manage my stress, I will:    Exercise  Get some form of exercise, every day. This will help reduce pain and release endorphins, the  feel good  chemicals in your brain. This is almost as good as taking antidepressants!  This is not the same as joining a gym and then never going! (they count on that by the way ) It can be as simple as just going for a walk or doing some gardening, anything that will get you moving.      Hygiene   Maintain good hygiene (Get out of bed in the morning, Make your bed, Brush your teeth, Take a shower, and Get dressed like you were going to work, even if you are unemployed).  If your clothes don't fit try to get ones that do.    Diet  I will strive to eat foods that are good for me, drink plenty of water, and avoid excessive sugar, caffeine, alcohol, and other mood-altering substances.  Some foods that are helpful in depression are: complex carbohydrates, B vitamins, flaxseed, fish or fish oil, fresh fruits and vegetables.    Psychotherapy  I agree to participate in Individual Therapy (if recommended).    Medication  If prescribed medications, I agree to take them.  Missing doses can result in serious side effects.  I  understand that drinking alcohol, or other illicit drug use, may cause potential side effects.  I will not stop my medication abruptly without first discussing it with my provider.    Staying Connected With Others  I will stay in touch with my friends, family members, and my primary care provider/team.    Use your imagination  Be creative.  We all have a creative side; it doesn t matter if it s oil painting, sand castles, or mud pies! This will also kick up the endorphins.    Witness Beauty  (AKA stop and smell the roses) Take a look outside, even in mid-winter. Notice colors, textures. Watch the squirrels and birds.     Service to others  Be of service to others.  There is always someone else in need.  By helping others we can  get out of ourselves  and remember the really important things.  This also provides opportunities for practicing all the other parts of the program.    Humor  Laugh and be silly!  Adjust your TV habits for less news and crime-drama and more comedy.    Control your stress  Try breathing deep, massage therapy, biofeedback, and meditation. Find time to relax each day.     My support system    Clinic Contact:  Phone number:    Contact 1:  Phone number:    Contact 2:  Phone number:    Episcopalian/:  Phone number:    Therapist:  Phone number:    Local crisis center:    Phone number:    Other community support:  Phone number:

## 2018-05-22 ASSESSMENT — ASTHMA QUESTIONNAIRES: ACT_TOTALSCORE: 25

## 2018-05-22 ASSESSMENT — PATIENT HEALTH QUESTIONNAIRE - PHQ9: SUM OF ALL RESPONSES TO PHQ QUESTIONS 1-9: 5

## 2018-06-25 ENCOUNTER — TRANSFERRED RECORDS (OUTPATIENT)
Dept: HEALTH INFORMATION MANAGEMENT | Facility: CLINIC | Age: 43
End: 2018-06-25

## 2018-07-03 ENCOUNTER — TRANSFERRED RECORDS (OUTPATIENT)
Dept: HEALTH INFORMATION MANAGEMENT | Facility: CLINIC | Age: 43
End: 2018-07-03

## 2018-07-03 ENCOUNTER — TELEPHONE (OUTPATIENT)
Dept: INTERNAL MEDICINE | Facility: CLINIC | Age: 43
End: 2018-07-03

## 2018-07-03 NOTE — TELEPHONE ENCOUNTER
Reason for Call:  Other call back    Detailed comments: Please Philippe Regarding this patient    Phone Number Patient can be reached at: [phone]    Best Time: Anytime    Can we leave a detailed message on this number? YES    Call taken on 7/3/2018 at 4:47 PM by JAH AQUINO

## 2018-07-05 ENCOUNTER — TELEPHONE (OUTPATIENT)
Dept: INTERNAL MEDICINE | Facility: CLINIC | Age: 43
End: 2018-07-05

## 2018-07-05 NOTE — LETTER
[facility]  [address] [place] [address]  [phone]      7/5/2018       Yahaira Ramirez  [address] [place] [address]  [place] [address]        Dear Yahaira,    I received a call from your dermatologist who inform me of the results of your skin biopsy.  It might not be a bad idea for you to follow-up so we could potentially do some additional studies to check for the presence of your sarcoid elsewhere.  You may call and schedule an appointment at your convenience and I will look forward to seeing you.    Sincerely,      Guille Torre MD  Internal Medicine

## 2018-07-05 NOTE — TELEPHONE ENCOUNTER
Called physician and left a voicemail requesting call back with additional information.  Taryn Ramirez CMA on 7/5/2018 at 1:41 PM

## 2018-07-07 ENCOUNTER — HEALTH MAINTENANCE LETTER (OUTPATIENT)
Age: 43
End: 2018-07-07

## 2018-07-09 ENCOUNTER — OFFICE VISIT (OUTPATIENT)
Dept: OBGYN | Facility: CLINIC | Age: 43
End: 2018-07-09
Payer: COMMERCIAL

## 2018-07-09 VITALS
DIASTOLIC BLOOD PRESSURE: 70 MMHG | BODY MASS INDEX: 39.17 KG/M2 | SYSTOLIC BLOOD PRESSURE: 122 MMHG | WEIGHT: 242.7 LBS | HEART RATE: 100 BPM

## 2018-07-09 DIAGNOSIS — N92.0 EXCESSIVE OR FREQUENT MENSTRUATION: ICD-10-CM

## 2018-07-09 DIAGNOSIS — Z01.419 ENCOUNTER FOR GYNECOLOGICAL EXAMINATION WITHOUT ABNORMAL FINDING: Primary | ICD-10-CM

## 2018-07-09 PROBLEM — D86.9 SARCOIDOSIS: Status: ACTIVE | Noted: 2018-07-09

## 2018-07-09 PROCEDURE — 80061 LIPID PANEL: CPT | Performed by: OBSTETRICS & GYNECOLOGY

## 2018-07-09 PROCEDURE — 99396 PREV VISIT EST AGE 40-64: CPT | Performed by: OBSTETRICS & GYNECOLOGY

## 2018-07-09 PROCEDURE — 36415 COLL VENOUS BLD VENIPUNCTURE: CPT | Performed by: OBSTETRICS & GYNECOLOGY

## 2018-07-09 PROCEDURE — 87624 HPV HI-RISK TYP POOLED RSLT: CPT | Performed by: OBSTETRICS & GYNECOLOGY

## 2018-07-09 PROCEDURE — G0145 SCR C/V CYTO,THINLAYER,RESCR: HCPCS | Performed by: OBSTETRICS & GYNECOLOGY

## 2018-07-09 NOTE — NURSING NOTE
"Chief Complaint   Patient presents with     Physical   fasting    Initial /70  Pulse 100  Wt 242 lb 11.2 oz (110.1 kg)  BMI 39.17 kg/m2 Estimated body mass index is 39.17 kg/(m^2) as calculated from the following:    Height as of 18: 5' 6\" (1.676 m).    Weight as of this encounter: 242 lb 11.2 oz (110.1 kg).  BP completed using cuff size: large        The following HM Due: pap smear      Virginia Friedman CMA                 "

## 2018-07-09 NOTE — PROGRESS NOTES
SUBJECTIVE:  Yahaira Ramirez is an 43 year old  P0 woman who presents for annual exam. No LMP recorded. Patient is not currently having periods (Reason: IUD).     Notes; bleeding (spotting) after intercourse    Past Medical History:   Diagnosis Date     ASCUS on Pap smear 03    + HPV     Asthma      ASTHMA - MILD INTERMITTENT 2005     Calculus of gallbladder without mention of cholecystitis or obstruction      CARDIOVASCULAR SCREENING; LDL GOAL LESS THAN 160 10/31/2010     Colon cancer (H)     chemo and radiation therapy started  until 2017     Depressive disorder 2011     History of colposcopy with cervical biopsy 03, 03    SIMONE I & III, SIMONE I     HUMAN PAPILLOMAVIRUS NOS 3/24/2003    61-low risk     HYPOTHYROIDISM NOS 9/10/2002     Mild major depression (H) 2011     Other postablative hypothyroidism     iatrogenically induced hypothyroidism after I-31 for hyperthyroidism   abstracted [id number]     Rectal cancer (H) 2017     Renal stones     HX of     Past Surgical History:   Procedure Laterality Date     ABDOMEN SURGERY  17    Colon resection     C IUD,MIRENA  2016     CHOLECYSTECTOMY       COLONOSCOPY Left 2016    Procedure: COMBINED COLONOSCOPY, SINGLE OR MULTIPLE BIOPSY/POLYPECTOMY BY BIOPSY;  Surgeon: Claudette De La Paz MD;  Location:  GI     COLONOSCOPY N/A 2017    Procedure: COLONOSCOPY;  COLONOSCOPY ;  Surgeon: Yair Adkins MD;  Location:  GI     DAVINCI COLECTOMY N/A 2017    Procedure: DAVINCI XI COLECTOMY;  Surgeon: Yair Adkins MD;  Location:  OR     ENT SURGERY  2016    Tonsilectomy     EXCISE MASS UPPER EXTREMITY  2012    Procedure: EXCISE MASS UPPER EXTREMITY;  Excision Subcutaneous Mass Right Tricep;  Surgeon: Mehul Maldonado MD;  Location:  OR     EYE SURGERY      Lasik     HC CAUTERY OF CERVIX, CRYOCAUTERY  3/23/03     MAMMOPLASTY REDUCTION BILATERAL  7/3/2014    Procedure: MAMMOPLASTY  REDUCTION BILATERAL;  Surgeon: Yoanna Mccabe MD;  Location:  SD     SIGMOIDOSCOPY FLEXIBLE N/A 3/21/2017    Procedure: SIGMOIDOSCOPY FLEXIBLE;  Surgeon: Yair Adkins MD;  Location:  GI     TAKEDOWN ILEOSTOMY N/A 9/6/2017    Procedure: TAKEDOWN ILEOSTOMY;  OPEN ILEOSTOMY REVERSAL ;  Surgeon: Yair Adkins MD;  Location:  OR     Current Outpatient Prescriptions   Medication     buPROPion (WELLBUTRIN SR) 150 MG 12 hr tablet     citalopram (CELEXA) 40 MG tablet     levalbuterol (XOPENEX HFA) 45 MCG/ACT inhaler     levothyroxine (SYNTHROID) 137 MCG tablet     mometasone-formoterol (DULERA) 100-5 MCG/ACT oral inhaler     No current facility-administered medications for this visit.      Allergies   Allergen Reactions     Cats      Pollen Extract      Sulfa Drugs Hives     Adhesive Tape Itching and Rash     Social History   Substance Use Topics     Smoking status: Former Smoker     Packs/day: 1.00     Years: 13.00     Types: Cigarettes     Quit date: 12/7/2006     Smokeless tobacco: Never Used     Alcohol use No      Comment: rare       Review of Systems  CONSTITUTIONAL:NEGATIVE  EYES: NEGATIVE  ENT/MOUTH: NEGATIVE  RESP: NEGATIVE  CV: NEGATIVE  GI: NEGATIVE  : see above  MUSCULOSKELATAL: NEGATIVE  INTEGUMENTARY/SKIN: NEGATIVE  BREAST: NEGATIVE  NEURO: NEGATIVE  ENDOCRINE: NEGATIVE  HEME/ALLERGY/IMMUNE: NEGATIVE  PSYCHIATRIC: NEGATIVE    OBJECTIVE:  /70  Pulse 100  Wt 242 lb 11.2 oz (110.1 kg)  BMI 39.17 kg/m2   EXAM:  GENERAL APPEARANCE: healthy, alert and no distress  BREAST: normal without masses, tenderness or nipple discharge and no palpable axillary masses or adenopathy  ABDOMEN: soft, nontender, without hepatosplenomegaly or masses    Pelvic Exam:  Urethra; negative  Vulva: No external lesions, normal hair distribution, no adenopathy  Vagina: Moist, pink, no abnormal discharge, well rugated, no lesions  Cervix: Pap smear is taken, nulliparous, smooth, pink, no visible lesions     IUD  string per os  Uterus: Normal size, anteverted, non-tender, mobile  Ovaries: No mass, non-tender, mobile      ASSESSMENT:  Satisfactory annual gyn exam    PLAN:  Pap smear and gyn exam  Pelvic ultrasound      PE: reviewed health maintenance including diet, regular exercise and periodic exams.  Health Maintenance   Topic Date Due     HIV SCREEN (SYSTEM ASSIGNED)  04/10/1992     ASTHMA ACTION PLAN Q1 YR  10/28/2017     PAP Q3 YR  05/01/2018     INFLUENZA VACCINE (1) 09/01/2018     TSH Q1 YEAR  10/23/2018     ASTHMA CONTROL TEST Q6 MOS  11/21/2018     PHQ-9 Q6 MONTHS  11/21/2018     DEPRESSION ACTION PLAN Q1 YR  05/21/2019     TETANUS Q10 YR  05/06/2021

## 2018-07-09 NOTE — MR AVS SNAPSHOT
After Visit Summary   7/9/2018    Yahaira Ramirez    MRN: [id number]           Patient Information     Date Of Birth          1975        Visit Information        Provider Department      7/9/2018 9:30 AM Gunnar Aguilar MD [facility]        Today's Diagnoses     Encounter for gynecological examination without abnormal finding    -  1    Excessive or frequent menstruation           Follow-ups after your visit        Future tests that were ordered for you today     Open Future Orders        Priority Expected Expires Ordered    US Pelvic Complete w Transvaginal Routine  7/9/2019 7/9/2018            Who to contact     If you have questions or need follow up information about today's clinic visit or your schedule please contact [facility] directly at [phone].  Normal or non-critical lab and imaging results will be communicated to you by MyChart, letter or phone within 4 business days after the clinic has received the results. If you do not hear from us within 7 days, please contact the clinic through [employer] or phone. If you have a critical or abnormal lab result, we will notify you by phone as soon as possible.  Submit refill requests through [employer] or call your pharmacy and they will forward the refill request to us. Please allow 3 business days for your refill to be completed.          Additional Information About Your Visit        MyChart Information     [employer] gives you secure access to your electronic health record. If you see a primary care provider, you can also send messages to your care team and make appointments. If you have questions, please call your primary care clinic.  If you do not have a primary care provider, please call [phone] and they will assist you.        Care EveryWhere ID     This is your Care EveryWhere ID. This could be used by other organizations to access your [facility] medical records  [id number]        Your Vitals Were      Pulse BMI (Body Mass Index)                100 39.17 kg/m2           Blood Pressure from Last 3 Encounters:   07/09/18 122/70   05/21/18 118/80   04/09/18 124/78    Weight from Last 3 Encounters:   07/09/18 242 lb 11.2 oz (110.1 kg)   05/21/18 244 lb 6.4 oz (110.9 kg)   04/09/18 248 lb (112.5 kg)              We Performed the Following     Lipid Profile (Chol, Trig, HDL, LDL calc)        Primary Care Provider Office Phone # Fax #    Guille Torre [phone]       [address] [facility] [address]        Equal Access to Services     ESTHER CHAVARRIA : Hadii amos de la cruzo Soreagan, waaxda luqadaha, qaybta kaalmada adeegyada, lan unger. So [facility] [phone].    ATENCIÓN: Si habla español, tiene a strong disposición servicios gratuitos de asistencia lingüística. Llame al [phone].    We comply with applicable federal civil rights laws and [place] laws. We do not discriminate on the basis of race, color, national origin, age, disability, sex, sexual orientation, or gender identity.            Thank you!     Thank you for choosing [facility]  for your care. Our goal is always to provide you with excellent care. Hearing back from our patients is one way we can continue to improve our services. Please take a few minutes to complete the written survey that you may receive in the mail after your visit with us. Thank you!             Your Updated Medication List - Protect others around you: Learn how to safely use, store and throw away your medicines at www.disposemymeds.org.          This list is accurate as of 7/9/18 10:06 AM.  Always use your most recent med list.                   Brand Name Dispense Instructions for use Diagnosis    buPROPion 150 MG 12 hr tablet    WELLBUTRIN SR    180 tablet    Take 1 tablet (150 mg) by mouth 2 times daily    Major depressive disorder, recurrent episode, mild (H)       citalopram 40 MG tablet    celeXA    90  tablet    Take 1 tablet (40 mg) by mouth every morning    Major depressive disorder, recurrent episode, mild (H)       levalbuterol 45 MCG/ACT Inhaler    XOPENEX HFA    1 Inhaler    Inhale 1-2 puffs into the lungs every 4 hours as needed for shortness of breath / dyspnea.        levothyroxine 137 MCG tablet    SYNTHROID    90 tablet    Take 1 tablet (137 mcg) by mouth every morning    Acquired hypothyroidism       mometasone-formoterol 100-5 MCG/ACT oral inhaler    DULERA    1 Inhaler    Inhale 2 puffs into the lungs 2 times daily    Mild intermittent asthma without complication

## 2018-07-10 LAB
CHOLEST SERPL-MCNC: 225 MG/DL
HDLC SERPL-MCNC: 77 MG/DL
LDLC SERPL CALC-MCNC: 122 MG/DL
NONHDLC SERPL-MCNC: 148 MG/DL
TRIGL SERPL-MCNC: 128 MG/DL

## 2018-07-11 LAB
COPATH REPORT: NORMAL
PAP: NORMAL

## 2018-07-12 LAB
FINAL DIAGNOSIS: NORMAL
HPV HR 12 DNA CVX QL NAA+PROBE: NEGATIVE
HPV16 DNA SPEC QL NAA+PROBE: NEGATIVE
HPV18 DNA SPEC QL NAA+PROBE: NEGATIVE
SPECIMEN DESCRIPTION: NORMAL
SPECIMEN SOURCE CVX/VAG CYTO: NORMAL

## 2018-07-30 ENCOUNTER — RADIANT APPOINTMENT (OUTPATIENT)
Dept: ULTRASOUND IMAGING | Facility: CLINIC | Age: 43
End: 2018-07-30
Attending: OBSTETRICS & GYNECOLOGY
Payer: COMMERCIAL

## 2018-07-30 DIAGNOSIS — N92.0 EXCESSIVE OR FREQUENT MENSTRUATION: ICD-10-CM

## 2018-07-30 PROCEDURE — 76856 US EXAM PELVIC COMPLETE: CPT | Performed by: OBSTETRICS & GYNECOLOGY

## 2018-07-30 PROCEDURE — 76830 TRANSVAGINAL US NON-OB: CPT | Performed by: OBSTETRICS & GYNECOLOGY

## 2018-08-13 ENCOUNTER — TRANSFERRED RECORDS (OUTPATIENT)
Dept: HEALTH INFORMATION MANAGEMENT | Facility: CLINIC | Age: 43
End: 2018-08-13

## 2018-08-20 ENCOUNTER — RADIANT APPOINTMENT (OUTPATIENT)
Dept: GENERAL RADIOLOGY | Facility: CLINIC | Age: 43
End: 2018-08-20
Attending: INTERNAL MEDICINE
Payer: COMMERCIAL

## 2018-08-20 ENCOUNTER — OFFICE VISIT (OUTPATIENT)
Dept: INTERNAL MEDICINE | Facility: CLINIC | Age: 43
End: 2018-08-20
Payer: COMMERCIAL

## 2018-08-20 VITALS
SYSTOLIC BLOOD PRESSURE: 118 MMHG | OXYGEN SATURATION: 99 % | DIASTOLIC BLOOD PRESSURE: 78 MMHG | RESPIRATION RATE: 15 BRPM | HEART RATE: 86 BPM | BODY MASS INDEX: 39.43 KG/M2 | WEIGHT: 244.3 LBS | TEMPERATURE: 98.7 F

## 2018-08-20 DIAGNOSIS — E66.01 MORBID OBESITY DUE TO EXCESS CALORIES (H): ICD-10-CM

## 2018-08-20 DIAGNOSIS — D86.3 SARCOIDOSIS OF SKIN (H): ICD-10-CM

## 2018-08-20 DIAGNOSIS — F33.0 MAJOR DEPRESSIVE DISORDER, RECURRENT EPISODE, MILD (H): ICD-10-CM

## 2018-08-20 DIAGNOSIS — J45.20 MILD INTERMITTENT ASTHMA WITHOUT COMPLICATION: ICD-10-CM

## 2018-08-20 DIAGNOSIS — D86.3 SARCOIDOSIS OF SKIN (H): Primary | ICD-10-CM

## 2018-08-20 LAB
ERYTHROCYTE [DISTWIDTH] IN BLOOD BY AUTOMATED COUNT: 14.6 % (ref 10–15)
ERYTHROCYTE [SEDIMENTATION RATE] IN BLOOD BY WESTERGREN METHOD: 43 MM/H (ref 0–20)
HCT VFR BLD AUTO: 34.7 % (ref 35–47)
HGB BLD-MCNC: 11.3 G/DL (ref 11.7–15.7)
MCH RBC QN AUTO: 32 PG (ref 26.5–33)
MCHC RBC AUTO-ENTMCNC: 32.6 G/DL (ref 31.5–36.5)
MCV RBC AUTO: 98 FL (ref 78–100)
PLATELET # BLD AUTO: 336 10E9/L (ref 150–450)
RBC # BLD AUTO: 3.53 10E12/L (ref 3.8–5.2)
WBC # BLD AUTO: 4.6 10E9/L (ref 4–11)

## 2018-08-20 PROCEDURE — 36415 COLL VENOUS BLD VENIPUNCTURE: CPT | Performed by: INTERNAL MEDICINE

## 2018-08-20 PROCEDURE — 85027 COMPLETE CBC AUTOMATED: CPT | Performed by: INTERNAL MEDICINE

## 2018-08-20 PROCEDURE — 99000 SPECIMEN HANDLING OFFICE-LAB: CPT | Performed by: INTERNAL MEDICINE

## 2018-08-20 PROCEDURE — 71046 X-RAY EXAM CHEST 2 VIEWS: CPT | Mod: FY

## 2018-08-20 PROCEDURE — 85652 RBC SED RATE AUTOMATED: CPT | Performed by: INTERNAL MEDICINE

## 2018-08-20 PROCEDURE — 82164 ANGIOTENSIN I ENZYME TEST: CPT | Mod: 90 | Performed by: INTERNAL MEDICINE

## 2018-08-20 PROCEDURE — 99214 OFFICE O/P EST MOD 30 MIN: CPT | Performed by: INTERNAL MEDICINE

## 2018-08-20 NOTE — LETTER
My Asthma Action Plan  Name: Yhaaira Ramirez   YOB: 1975  Date: 8/20/2018   My doctor: Guille Torre MD   My clinic: [facility]        My Control Medicine: Mometasone+formoterol (Dulera) -  100/5 mcg        Dose: 2 puffs daily  My Rescue Medicine: Levalbuterol (Xopenex) HFA        Dose: 1-2 puffs PRN My Asthma Severity: mild intermittent  Avoid your asthma triggers: Patient is unaware of triggers  None            GREEN ZONE   Good Control    I feel good    No cough or wheeze    Can work, sleep and play without asthma symptoms       Take your asthma control medicine every day.     1. If exercise triggers your asthma, take your rescue medication    15 minutes before exercise or sports, and    During exercise if you have asthma symptoms  2. Spacer to use with inhaler: If you have a spacer, make sure to use it with your inhaler             YELLOW ZONE Getting Worse  I have ANY of these:    I do not feel good    Cough or wheeze    Chest feels tight    Wake up at night   1. Keep taking your Green Zone medications  2. Start taking your rescue medicine:    every 20 minutes for up to 1 hour. Then every 4 hours for 24-48 hours.  3. If you stay in the Yellow Zone for more than 12-24 hours, contact your doctor.  4. If you do not return to the Green Zone in 12-24 hours or you get worse, start taking your oral steroid medicine if prescribed by your provider.           RED ZONE Medical Alert - Get Help  I have ANY of these:    I feel awful    Medicine is not helping    Breathing getting harder    Trouble walking or talking    Nose opens wide to breathe       1. Take your rescue medicine NOW  2. If your provider has prescribed an oral steroid medicine, start taking it NOW  3. Call your doctor NOW  4. If you are still in the Red Zone after 20 minutes and you have not reached your doctor:    Take your rescue medicine again and    Call 911 or go to the emergency room right away    See your  regular doctor within 2 weeks of an Emergency Room or Urgent Care visit for follow-up treatment.          Annual Reminders:  Meet with Asthma Educator,  Flu Shot in the Fall, consider Pneumonia Vaccination for patients with asthma (aged 19 and older).    Pharmacy: [id number]  [place] - [address]                      Asthma Triggers  How To Control Things That Make Your Asthma Worse    Triggers are things that make your asthma worse.  Look at the list below to help you find your triggers and what you can do about them.  You can help prevent asthma flare-ups by staying away from your triggers.      Trigger                                                          What you can do   Cigarette Smoke  Tobacco smoke can make asthma worse. Do not allow smoking in your home, car or around you.  Be sure no one smokes at a child s day care or school.  If you smoke, ask your health care provider for ways to help you quit.  Ask family members to quit too.  Ask your health care provider for a referral to Quit Plan to help you quit smoking, or call 8-552-724-PLAN.     Colds, Flu, Bronchitis  These are common triggers of asthma. Wash your hands often.  Don t touch your eyes, nose or mouth.  Get a flu shot every year.     Dust Mites  These are tiny bugs that live in cloth or carpet. They are too small to see. Wash sheets and blankets in hot water every week.   Encase pillows and mattress in dust mite proof covers.  Avoid having carpet if you can. If you have carpet, vacuum weekly.   Use a dust mask and HEPA vacuum.   Pollen and Outdoor Mold  Some people are allergic to trees, grass, or weed pollen, or molds. Try to keep your windows closed.  Limit time out doors when pollen count is high.   Ask you health care provider about taking medicine during allergy season.     Animal Dander  Some people are allergic to skin flakes, urine or saliva from pets with fur or feathers. Keep pets with fur or feathers out  of your home.    If you can t keep the pet outdoors, then keep the pet out of your bedroom.  Keep the bedroom door closed.  Keep pets off cloth furniture and away from stuffed toys.     Mice, Rats, and Cockroaches  Some people are allergic to the waste from these pests.   Cover food and garbage.  Clean up spills and food crumbs.  Store grease in the refrigerator.   Keep food out of the bedroom.   Indoor Mold  This can be a trigger if your home has high moisture. Fix leaking faucets, pipes, or other sources of water.   Clean moldy surfaces.  Dehumidify basement if it is damp and smelly.   Smoke, Strong Odors, and Sprays  These can reduce air quality. Stay away from strong odors and sprays, such as perfume, powder, hair spray, paints, smoke incense, paint, cleaning products, candles and new carpet.   Exercise or Sports  Some people with asthma have this trigger. Be active!  Ask your doctor about taking medicine before sports or exercise to prevent symptoms.    Warm up for 5-10 minutes before and after sports or exercise.     Other Triggers of Asthma  Cold air:  Cover your nose and mouth with a scarf.  Sometimes laughing or crying can be a trigger.  Some medicines and food can trigger asthma.

## 2018-08-20 NOTE — LETTER
[facility]  [address] [address]  [phone]      8/21/2018       Yahaira Ramirez  [address] [place] [address]  [place] [address]        Dear Yahaira,    Your specific test for sarcoidosis in regards to your ACE level returned normal.    Your hemoglobin returned slightly low but better from when last checked and should be followed with a recheck in 3 months.    Your sed rate which is a nonspecific test for inflammation was slightly elevated and may be a source for follow-up thus I would recommend you see the lung specialist as we discussed.  It is unclear to me whether not the recent diagnosis based on your biopsy results of your skin could cause your sed rate to be slightly elevated and this is potentially something you may want to ask your dermatologist.    Your chest x-ray demonstrates that since September 2015 your heart size appears to be a little bit on the upper limit of normal which is unchanged but there is no definite fluid collection or abnormal enlargement of lymph nodes that would be consistent with sarcoidosis.    Sincerely,      Guille Torre MD  Internal Medicine

## 2018-08-20 NOTE — PROGRESS NOTES
SUBJECTIVE:   Yahaira Ramirez is a 43 year old female who presents to clinic today for the following health issues:    Follow up systemic work-up following positive sarcoidosis of skin with [facility] (Dr. Smith).  Patient is apparently seen a dermatologist at which time she underwent a biopsy of skin which demonstrated what appears to be sarcoidosis.  She is here with no other systemic history or concerns other than a notable history of asthma.    Patient was seen by the dermatologist with some very mild skin changes that were randomly biopsied and apparently returned as sarcoidosis.  She currently has no obvious cough, productive sputum, wheezing.  She has had a CT scan recently done in October 2017 demonstrating no distinct hilar adenopathy.  She is also had some routine lab work done through her oncologist in August of this month demonstrating a baseline slightly low hemoglobin and normal WBC count.    HGB last check 11.2 per Oncology 8/208  WBC normal.    Problem list and histories reviewed & adjusted, as indicated.  Additional history: as documented    Patient Active Problem List   Diagnosis     Hypertrophy of breast     CARDIOVASCULAR SCREENING; LDL GOAL LESS THAN 160     Acquired hypothyroidism     Mild intermittent asthma without complication     Major depressive disorder, recurrent episode, mild (H)     Mirena inserted : due for removal 2019 or 2021     Rectal cancer (H)     Morbid obesity due to excess calories (H)     Ileostomy status (H)     BMI 39.0-39.9,adult     Sarcoidosis     Past Surgical History:   Procedure Laterality Date     ABDOMEN SURGERY  4/5/17    Colon resection     C IUD,MIRENA  4/2016     CHOLECYSTECTOMY       COLONOSCOPY Left 12/9/2016    Procedure: COMBINED COLONOSCOPY, SINGLE OR MULTIPLE BIOPSY/POLYPECTOMY BY BIOPSY;  Surgeon: Claudette De La Paz MD;  Location:  GI     COLONOSCOPY N/A 8/14/2017    Procedure: COLONOSCOPY;  COLONOSCOPY ;  Surgeon: Yair Adkins  MD ZAYRA;  Location: [facility]     DAVINCI COLECTOMY N/A 2017    Procedure: DAVINCI XI COLECTOMY;  Surgeon: Yair Adkins MD;  Location:  OR     ENT SURGERY  2016    Tonsilectomy     EXCISE MASS UPPER EXTREMITY  2012    Procedure: EXCISE MASS UPPER EXTREMITY;  Excision Subcutaneous Mass Right Tricep;  Surgeon: Mehul Maldonado MD;  Location: [facility] OR     EYE SURGERY      Lasik     HC CAUTERY OF CERVIX, CRYOCAUTERY  3/23/03     MAMMOPLASTY REDUCTION BILATERAL  7/3/2014    Procedure: MAMMOPLASTY REDUCTION BILATERAL;  Surgeon: Yoanna Mccabe MD;  Location:  [facility]     SIGMOIDOSCOPY FLEXIBLE N/A 3/21/2017    Procedure: SIGMOIDOSCOPY FLEXIBLE;  Surgeon: Yair Adkins MD;  Location: [facility]     TAKEDOWN ILEOSTOMY N/A 2017    Procedure: TAKEDOWN ILEOSTOMY;  OPEN ILEOSTOMY REVERSAL ;  Surgeon: Yair Adkins MD;  Location:  OR       Social History   Substance Use Topics     Smoking status: Former Smoker     Packs/day: 1.00     Years: 13.00     Types: Cigarettes     Quit date: 2006     Smokeless tobacco: Never Used     Alcohol use No      Comment: rare     Family History   Problem Relation Age of Onset     Cancer Maternal Grandfather      -lung     Cancer Paternal Grandfather           Hypertension Father      Diabetes Father 60     type 2     Lipids Maternal Grandmother      Cancer Paternal Grandmother      pancreatic     Hypertension Mother      Colon Cancer No family hx of          Current Outpatient Prescriptions   Medication Sig Dispense Refill     buPROPion (WELLBUTRIN SR) 150 MG 12 hr tablet Take 1 tablet (150 mg) by mouth 2 times daily 180 tablet 3     citalopram (CELEXA) 40 MG tablet Take 1 tablet (40 mg) by mouth every morning 90 tablet 3     levalbuterol (XOPENEX HFA) 45 MCG/ACT inhaler Inhale 1-2 puffs into the lungs every 4 hours as needed for shortness of breath / dyspnea. 1 Inhaler 3     levothyroxine (SYNTHROID) 137 MCG tablet Take 1 tablet (137 mcg) by mouth every  morning 90 tablet 3     Loperamide HCl (IMODIUM A-D PO)        Methylcellulose, Laxative, (CITRUCEL PO)        mometasone-formoterol (DULERA) 100-5 MCG/ACT oral inhaler Inhale 2 puffs into the lungs 2 times daily 1 Inhaler 11     Allergies   Allergen Reactions     Cats      Pollen Extract      Sulfa Drugs Hives     Adhesive Tape Itching and Rash     BP Readings from Last 3 Encounters:   07/09/18 122/70   05/21/18 118/80   04/09/18 124/78    Wt Readings from Last 3 Encounters:   07/09/18 242 lb 11.2 oz (110.1 kg)   05/21/18 244 lb 6.4 oz (110.9 kg)   04/09/18 248 lb (112.5 kg)         Reviewed and updated as needed this visit by clinical staff       Reviewed and updated as needed this visit by Provider       ROS:  CONSTITUTIONAL: NEGATIVE for fever, chills, change in weight  ENT/MOUTH: NEGATIVE for ear, mouth and throat problems  RESP: NEGATIVE for significant cough or SOB  CV: NEGATIVE for chest pain, palpitations or peripheral edema  GI: NEGATIVE for nausea, abdominal pain, heartburn, or change in bowel habits  : NEGATIVE for frequency, dysuria, or hematuria  MUSCULOSKELETAL: NEGATIVE for significant arthralgias or myalgia  NEURO: NEGATIVE for weakness, dizziness or paresthesias  HEME: NEGATIVE for bleeding problems  PSYCHIATRIC: NEGATIVE for changes in mood or affect    OBJECTIVE:                                                    /78  Pulse 86  Temp 98.7  F (37.1  C) (Oral)  Resp 15  Wt 244 lb 4.8 oz (110.8 kg)  SpO2 99%  BMI 39.43 kg/m2  Body mass index is 39.43 kg/(m^2).  GENERAL: healthy, alert and no distress  EYES: Eyes grossly normal to inspection, extraocular movements - intact, and PERRL  HENT: ear canals- normal; TMs- normal; Nose- normal; Mouth- no ulcers, no lesions  NECK: no tenderness, no adenopathy, no asymmetry, no masses, no stiffness; thyroid- normal to palpation  RESP: lungs clear to auscultation - no rales, no rhonchi, no wheezes  CV: regular rates and rhythm, normal S1 S2, no S3  or S4 and no click or rub   ABDOMEN: obese  SKIN: noted prior biopsy site right dorsal forearm.  Tiny site note.  PSYCH: Alert and oriented times 3; speech- coherent , normal rate and volume; able to articulate logical thoughts, able to abstract reason, no tangential thoughts, no hallucinations or delusions, affect- normal       ASSESSMENT/PLAN:                                                      (D86.3) Sarcoidosis of skin  (primary encounter diagnosis)  Comment: No systemic symptoms of obvious change noted on clinical exam today.  Suggested some basic lab works plus an ACE level as well as a chest x-ray and referral to pulmonology for further assessment and need for ongoing tests, PFTs or high-resolution CT scan.  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    (E66.01) Morbid obesity due to excess calories (H)  Comment:   Plan: Dietary changes recommended and ongoing weight loss to encourage    (F33.0) Major depressive disorder, recurrent episode, mild (H)  Comment: Stable per PHQ 9 score as noted  Plan:     (J45.20) Mild intermittent asthma without complication  Comment: Stable currently on therapy without reactive airway symptoms  Plan:         See Patient Instructions    Guille Torre MD  [facility]    THE MEDICATION LIST HAS BEEN FULLY RECONCILED BY THE M.D. AND THE NURSING STAFF.    25 minutes spent with this patient, face to face, discussing treatment options for listed problems above as well as side effects of appropriate medications.  Counseling time extended beyond 50% of the clinic visit.  Medication dosing, treatment plan and follow-up were discussed. Also reviewed need for primary care testing for patient.

## 2018-08-20 NOTE — MR AVS SNAPSHOT
After Visit Summary   8/20/2018    Yahaira Ramirez    MRN: [id number]           Patient Information     Date Of Birth          1975        Visit Information        Provider Department      8/20/2018 7:00 AM Guille Torre MD [facility]        Today's Diagnoses     Sarcoidosis of skin    -  1    Morbid obesity due to excess calories (H)        Major depressive disorder, recurrent episode, mild (H)        Mild intermittent asthma without complication           Follow-ups after your visit        Additional Services     PULMONARY MEDICINE REFERRAL       Your provider has referred you to: N: [facility]  [place] [phone]   [email or web address]    Please be aware that coverage of these services is subject to the terms and limitations of your health insurance plan.  Call member services at your health plan with any benefit or coverage questions.      Please bring the following with you to your appointment:    (1) Any X-Rays, CTs or MRIs which have been performed.  Contact the facility where they were done to arrange for  prior to your scheduled appointment.    (2) List of current medications   (3) This referral request   (4) Any documents/labs given to you for this referral                  Your next 10 appointments already scheduled     Dec 10, 2018 10:00 AM CST   CT ABDOMEN PELVIS W CONTRAST with [id number] [facility] [place] ([facility])    [address] [address] [address] [phone]           Please bring any scans or X-rays taken at other hospitals, if similar tests were done. Also bring a list of your medicines, including vitamins, minerals and over-the-counter drugs. It is safest to leave personal items at home.  Be sure to tell your doctor:   If you have any allergies.   If there s any chance you are pregnant.   If you are breastfeeding.  How to prepare:   Do not eat or drink for 2  hours before your exam. If you need to take medicine, you may take it with small sips of water. (We may ask you to take liquid medicine as well.)   Please wear loose clothing, such as a sweat suit or jogging clothes. Avoid snaps, zippers and other metal. We may ask you to undress and put on a hospital gown.  Please arrive 30 minutes early for your CT. Once in the department you might be asked to drink water 15-20 minutes prior to your exam.  If indicated you may be asked to drink an oral contrast in advance of your CT.  If this is the case, the imaging team will let you know or be in contact with you prior to your appointment  Patients over 70 or patients with diabetes or kidney problems:   If you haven t had a blood test (creatinine test) within the last 30 days, the Cardiologist/Radiologist may require you to get this test prior to your exam.  If you have diabetes:   Continue to take your metformin medication on the day of your exam  If you have any questions, please call the Imaging Department where you will have your exam.              Future tests that were ordered for you today     Open Future Orders        Priority Expected Expires Ordered    XR Chest 2 Views Routine 8/20/2018 8/20/2019 8/20/2018            Who to contact     If you have questions or need follow up information about today's clinic visit or your schedule please contact [facility] directly at [phone].  Normal or non-critical lab and imaging results will be communicated to you by MyChart, letter or phone within 4 business days after the clinic has received the results. If you do not hear from us within 7 days, please contact the clinic through MyChart or phone. If you have a critical or abnormal lab result, we will notify you by phone as soon as possible.  Submit refill requests through [employer] or call your pharmacy and they will forward the refill request to us. Please allow 3 business days for your refill to be  completed.          Additional Information About Your Visit        [employer] Information     [employer] gives you secure access to your electronic health record. If you see a primary care provider, you can also send messages to your care team and make appointments. If you have questions, please call your primary care clinic.  If you do not have a primary care provider, please call [phone] and they will assist you.        Care EveryWhere ID     This is your Care EveryWhere ID. This could be used by other organizations to access your [facility] medical records  [id number]        Your Vitals Were     Pulse Temperature Respirations Pulse Oximetry BMI (Body Mass Index)       86 98.7  F (37.1  C) (Oral) 15 99% 39.43 kg/m2        Blood Pressure from Last 3 Encounters:   08/20/18 118/78   07/09/18 122/70   05/21/18 118/80    Weight from Last 3 Encounters:   08/20/18 244 lb 4.8 oz (110.8 kg)   07/09/18 242 lb 11.2 oz (110.1 kg)   05/21/18 244 lb 6.4 oz (110.9 kg)              We Performed the Following     Angiotensin converting enzyme     Asthma Action Plan (AAP)     CBC with platelets     Erythrocyte sedimentation rate auto     PULMONARY MEDICINE REFERRAL        Primary Care Provider Office Phone # Fax #    Guille Torre [phone]       [address] [place] [id number]        Equal Access to Services     ESTHER CHAVARRIA : Hadii aad ku hadasho Soomaali, waaxda luqadaha, qaybta kaalmada adeegyada, lan unger. So [facility] [phone].    ATENCIÓN: Si habla español, tiene a strong disposición servicios gratuitos de asistencia lingüística. Llame al [phone].    We comply with applicable federal civil rights laws and [place] laws. We do not discriminate on the basis of race, color, national origin, age, disability, sex, sexual orientation, or gender identity.            Thank you!     Thank you for choosing [facility]  for your care. Our goal is  always to provide you with excellent care. Hearing back from our patients is one way we can continue to improve our services. Please take a few minutes to complete the written survey that you may receive in the mail after your visit with us. Thank you!             Your Updated Medication List - Protect others around you: Learn how to safely use, store and throw away your medicines at www.disposemymeds.org.          This list is accurate as of 8/20/18  7:06 AM.  Always use your most recent med list.                   Brand Name Dispense Instructions for use Diagnosis    buPROPion 150 MG 12 hr tablet    WELLBUTRIN SR    180 tablet    Take 1 tablet (150 mg) by mouth 2 times daily    Major depressive disorder, recurrent episode, mild (H)       citalopram 40 MG tablet    celeXA    90 tablet    Take 1 tablet (40 mg) by mouth every morning    Major depressive disorder, recurrent episode, mild (H)       CITRUCEL PO           IMODIUM A-D PO           levalbuterol 45 MCG/ACT Inhaler    XOPENEX HFA    1 Inhaler    Inhale 1-2 puffs into the lungs every 4 hours as needed for shortness of breath / dyspnea.        levothyroxine 137 MCG tablet    SYNTHROID    90 tablet    Take 1 tablet (137 mcg) by mouth every morning    Acquired hypothyroidism       mometasone-formoterol 100-5 MCG/ACT oral inhaler    DULERA    1 Inhaler    Inhale 2 puffs into the lungs 2 times daily    Mild intermittent asthma without complication

## 2018-08-21 LAB — ACE SERPL-CCNC: 34 U/L (ref 9–67)

## 2018-08-21 ASSESSMENT — ASTHMA QUESTIONNAIRES: ACT_TOTALSCORE: 23

## 2018-08-21 ASSESSMENT — PATIENT HEALTH QUESTIONNAIRE - PHQ9: SUM OF ALL RESPONSES TO PHQ QUESTIONS 1-9: 2

## 2018-09-24 ENCOUNTER — TRANSFERRED RECORDS (OUTPATIENT)
Dept: HEALTH INFORMATION MANAGEMENT | Facility: CLINIC | Age: 43
End: 2018-09-24

## 2018-10-01 DIAGNOSIS — E03.9 ACQUIRED HYPOTHYROIDISM: ICD-10-CM

## 2018-10-01 LAB — TSH SERPL DL<=0.005 MIU/L-ACNC: 1.52 MU/L (ref 0.4–4)

## 2018-10-01 PROCEDURE — 84443 ASSAY THYROID STIM HORMONE: CPT | Performed by: INTERNAL MEDICINE

## 2018-10-01 PROCEDURE — 36415 COLL VENOUS BLD VENIPUNCTURE: CPT | Performed by: INTERNAL MEDICINE

## 2018-10-15 ENCOUNTER — MYC MEDICAL ADVICE (OUTPATIENT)
Dept: INTERNAL MEDICINE | Facility: CLINIC | Age: 43
End: 2018-10-15

## 2018-11-28 ENCOUNTER — HOSPITAL ENCOUNTER (OUTPATIENT)
Dept: CT IMAGING | Facility: CLINIC | Age: 43
End: 2018-11-28
Attending: INTERNAL MEDICINE
Payer: COMMERCIAL

## 2018-11-28 DIAGNOSIS — D86.3 SARCOIDOSIS OF SKIN (H): ICD-10-CM

## 2018-11-28 DIAGNOSIS — C20 RECTAL CANCER (H): ICD-10-CM

## 2018-11-28 DIAGNOSIS — J45.20 MILD INTERMITTENT ASTHMA WITHOUT COMPLICATION: ICD-10-CM

## 2018-11-28 PROCEDURE — 25000128 H RX IP 250 OP 636: Performed by: INTERNAL MEDICINE

## 2018-11-28 PROCEDURE — 71260 CT THORAX DX C+: CPT

## 2018-11-28 RX ORDER — IOPAMIDOL 755 MG/ML
500 INJECTION, SOLUTION INTRAVASCULAR ONCE
Status: COMPLETED | OUTPATIENT
Start: 2018-11-28 | End: 2018-11-28

## 2018-11-28 RX ADMIN — SODIUM CHLORIDE 65 ML: 9 INJECTION, SOLUTION INTRAVENOUS at 08:36

## 2018-11-28 RX ADMIN — IOPAMIDOL 100 ML: 755 INJECTION, SOLUTION INTRAVENOUS at 08:36

## 2018-12-17 ENCOUNTER — TRANSFERRED RECORDS (OUTPATIENT)
Dept: HEALTH INFORMATION MANAGEMENT | Facility: CLINIC | Age: 43
End: 2018-12-17

## 2019-04-10 ENCOUNTER — HOSPITAL ENCOUNTER (OUTPATIENT)
Dept: MAMMOGRAPHY | Facility: CLINIC | Age: 44
Discharge: HOME | End: 2019-04-10
Attending: FAMILY MEDICINE | Admitting: FAMILY MEDICINE
Payer: COMMERCIAL

## 2019-04-10 DIAGNOSIS — F33.0 MAJOR DEPRESSIVE DISORDER, RECURRENT EPISODE, MILD (H): ICD-10-CM

## 2019-04-10 DIAGNOSIS — Z12.31 VISIT FOR SCREENING MAMMOGRAM: ICD-10-CM

## 2019-04-10 PROCEDURE — 77063 BREAST TOMOSYNTHESIS BI: CPT

## 2019-04-10 RX ORDER — CITALOPRAM HYDROBROMIDE 40 MG/1
TABLET ORAL
Qty: 30 TABLET | Refills: 0 | Status: SHIPPED | OUTPATIENT
Start: 2019-04-10 | End: 2023-12-31

## 2019-04-10 RX ORDER — BUPROPION HYDROCHLORIDE 150 MG/1
TABLET, EXTENDED RELEASE ORAL
Qty: 60 TABLET | Refills: 0 | Status: SHIPPED | OUTPATIENT
Start: 2019-04-10 | End: 2023-12-31

## 2019-04-10 NOTE — TELEPHONE ENCOUNTER
PHQ-9 SCORE 4/18/2017 5/21/2018 8/20/2018   PHQ-9 Total Score - - -   PHQ-9 Total Score 1 5 2     Medication is being filled for 1 time refill only due to:  needs office visit

## 2019-04-10 NOTE — TELEPHONE ENCOUNTER
"Requested Prescriptions   Pending Prescriptions Disp Refills     citalopram (CELEXA) 40 MG tablet [Pharmacy Med Name: CITALOPRAM 40MG     TAB] 90 tablet 3     Sig: TAKE 1 TABLET BY MOUTH ONCE DAILY IN THE MORNING       SSRIs Protocol Failed - 4/10/2019  8:33 AM        Failed - PHQ-9 score less than 5 in past 6 months     Please review last PHQ-9 score.   Requested Prescriptions   Pending Prescriptions Disp Refills     citalopram (CELEXA) 40 MG tablet [Pharmacy Med Name: CITALOPRAM 40MG     TAB] 90 tablet 3     Sig: TAKE 1 TABLET BY MOUTH ONCE DAILY IN THE MORNING       SSRIs Protocol Failed - 4/10/2019  8:33 AM        Failed - PHQ-9 score less than 5 in past 6 months     Please review last PHQ-9 score.           Failed - Recent (6 mo) or future (30 days) visit within the authorizing provider's specialty     Patient had office visit in the last 6 months or has a visit in the next 30 days with authorizing provider or within the authorizing provider's specialty.  See \"Patient Info\" tab in inbasket, or \"Choose Columns\" in Meds & Orders section of the refill encounter.            Passed - Medication is Bupropion     If the medication is Bupropion (Wellbutrin), and the patient is taking for smoking cessation; OK to refill.          Passed - Medication is active on med list        Passed - Patient is age 18 or older        Passed - No active pregnancy on record        Passed - No positive pregnancy test in last 12 months        buPROPion (WELLBUTRIN SR) 150 MG 12 hr tablet [Pharmacy Med Name: BUPROPION ER 150MG TAB] 180 tablet 3     Sig: TAKE 1 TABLET BY MOUTH TWICE DAILY       SSRIs Protocol Failed - 4/10/2019  8:33 AM        Failed - PHQ-9 score less than 5 in past 6 months     Please review last PHQ-9 score.           Failed - Recent (6 mo) or future (30 days) visit within the authorizing provider's specialty     Patient had office visit in the last 6 months or has a visit in the next 30 days with authorizing provider " "or within the authorizing provider's specialty.  See \"Patient Info\" tab in inbasket, or \"Choose Columns\" in Meds & Orders section of the refill encounter.            Passed - Medication is Bupropion     If the medication is Bupropion (Wellbutrin), and the patient is taking for smoking cessation; OK to refill.          Passed - Medication is active on med list        Passed - Patient is age 18 or older        Passed - No active pregnancy on record        Passed - No positive pregnancy test in last 12 months        Last Written Prescription Date:  5/21/18  Last Fill Quantity: 90,  # refills: 3   Last office visit: 8/20/2018 with prescribing provider:  8/20/18   Future Office Visit:            Failed - Recent (6 mo) or future (30 days) visit within the authorizing provider's specialty     Patient had office visit in the last 6 months or has a visit in the next 30 days with authorizing provider or within the authorizing provider's specialty.  See \"Patient Info\" tab in intheScoreet, or \"Choose Columns\" in Meds & Orders section of the refill encounter.            Passed - Medication is Bupropion     If the medication is Bupropion (Wellbutrin), and the patient is taking for smoking cessation; OK to refill.          Passed - Medication is active on med list        Passed - Patient is age 18 or older        Passed - No active pregnancy on record        Passed - No positive pregnancy test in last 12 months        buPROPion (WELLBUTRIN SR) 150 MG 12 hr tablet [Pharmacy Med Name: BUPROPION ER 150MG TAB] 180 tablet 3     Sig: TAKE 1 TABLET BY MOUTH TWICE DAILY       SSRIs Protocol Failed - 4/10/2019  8:33 AM        Failed - PHQ-9 score less than 5 in past 6 months     Please review last PHQ-9 score.           Failed - Recent (6 mo) or future (30 days) visit within the authorizing provider's specialty     Patient had office visit in the last 6 months or has a visit in the next 30 days with authorizing provider or within the " "authorizing provider's specialty.  See \"Patient Info\" tab in inbasket, or \"Choose Columns\" in Meds & Orders section of the refill encounter.            Passed - Medication is Bupropion     If the medication is Bupropion (Wellbutrin), and the patient is taking for smoking cessation; OK to refill.          Passed - Medication is active on med list        Passed - Patient is age 18 or older        Passed - No active pregnancy on record        Passed - No positive pregnancy test in last 12 months        Last Written Prescription Date:  5/21/18  Last Fill Quantity: 180,  # refills: 3   Last office visit: 8/20/2018 with prescribing provider:  8/20/18   Future Office Visit:      "

## 2019-04-10 NOTE — LETTER
[facility]  [address] [id number] [phone]            Yahaira Ramirez  [address] [facility] [place] [address]        April 10, 2019    Dear Yahaira,    While refilling your prescription today, we noticed that you are due for an appointment with your provider.  We will refill your prescription for 30 days, but a follow-up appointment must be made before any additional refills can be approved.     Taking care of your health is important to us and we look forward to seeing you in the near future.  Please call us at [phone] or [phone] (or use [employer]) to schedule an appointment.     Please disregard this notice if you have already made an appointment.    Sincerely,        [facility]

## 2019-08-08 ENCOUNTER — TRANSFERRED RECORDS (OUTPATIENT)
Dept: HEALTH INFORMATION MANAGEMENT | Facility: CLINIC | Age: 44
End: 2019-08-08

## 2019-11-07 ENCOUNTER — HOSPITAL ENCOUNTER (OUTPATIENT)
Dept: CT IMAGING | Facility: CLINIC | Age: 44
Discharge: HOME | End: 2019-11-07
Attending: INTERNAL MEDICINE | Admitting: INTERNAL MEDICINE
Payer: COMMERCIAL

## 2019-11-07 DIAGNOSIS — C20 RECTAL CANCER (H): ICD-10-CM

## 2019-11-07 PROCEDURE — 71260 CT THORAX DX C+: CPT

## 2019-11-07 PROCEDURE — 74177 CT ABD & PELVIS W/CONTRAST: CPT

## 2019-11-07 PROCEDURE — 25000128 H RX IP 250 OP 636: Performed by: RADIOLOGY

## 2019-11-07 PROCEDURE — 25000125 ZZHC RX 250: Performed by: RADIOLOGY

## 2019-11-07 RX ORDER — IOPAMIDOL 755 MG/ML
500 INJECTION, SOLUTION INTRAVASCULAR ONCE
Status: COMPLETED | OUTPATIENT
Start: 2019-11-07 | End: 2019-11-07

## 2019-11-07 RX ADMIN — IOPAMIDOL 100 ML: 755 INJECTION, SOLUTION INTRAVENOUS at 09:03

## 2019-11-07 RX ADMIN — SODIUM CHLORIDE 65 ML: 9 INJECTION, SOLUTION INTRAVENOUS at 09:03

## 2019-12-16 ENCOUNTER — HEALTH MAINTENANCE LETTER (OUTPATIENT)
Age: 44
End: 2019-12-16

## 2020-02-25 RX ORDER — LIDOCAINE 40 MG/G
CREAM TOPICAL
Status: CANCELLED | OUTPATIENT
Start: 2020-02-25

## 2020-02-25 RX ORDER — ONDANSETRON 2 MG/ML
4 INJECTION INTRAMUSCULAR; INTRAVENOUS
Status: CANCELLED | OUTPATIENT
Start: 2020-02-25

## 2020-05-14 ENCOUNTER — MEDICAL CORRESPONDENCE (OUTPATIENT)
Dept: HEALTH INFORMATION MANAGEMENT | Facility: CLINIC | Age: 45
End: 2020-05-14

## 2020-05-15 ENCOUNTER — HOSPITAL ENCOUNTER (OUTPATIENT)
Dept: MAMMOGRAPHY | Facility: CLINIC | Age: 45
Discharge: HOME | End: 2020-05-15
Attending: FAMILY MEDICINE | Admitting: FAMILY MEDICINE
Payer: COMMERCIAL

## 2020-05-15 DIAGNOSIS — Z12.31 VISIT FOR SCREENING MAMMOGRAM: ICD-10-CM

## 2020-05-15 PROCEDURE — 77067 SCR MAMMO BI INCL CAD: CPT

## 2020-05-18 ENCOUNTER — TELEPHONE (OUTPATIENT)
Dept: OBGYN | Facility: CLINIC | Age: 45
End: 2020-05-18

## 2020-05-18 NOTE — TELEPHONE ENCOUNTER
Received a referral from Zheng to call patient and schedule routine yearly gyn visit.  New to Dr. Harman as she used to see Dr. Aguilar.     While talking to patient she expressed that the reason for the referral is that her insurance changed and she cannot go to [place].    Patient will check with her insurance today to make sure that they will even cover with a referral and then call back to schedule a yearly exam.  Pap due 7/2021.    Referral sent to indio Friedman CMA

## 2020-05-22 DIAGNOSIS — Z11.59 ENCOUNTER FOR SCREENING FOR OTHER VIRAL DISEASES: Primary | ICD-10-CM

## 2020-05-29 DIAGNOSIS — Z11.59 ENCOUNTER FOR SCREENING FOR OTHER VIRAL DISEASES: ICD-10-CM

## 2020-05-29 PROCEDURE — 99000 SPECIMEN HANDLING OFFICE-LAB: CPT | Performed by: COLON & RECTAL SURGERY

## 2020-05-29 PROCEDURE — U0003 INFECTIOUS AGENT DETECTION BY NUCLEIC ACID (DNA OR RNA); SEVERE ACUTE RESPIRATORY SYNDROME CORONAVIRUS 2 (SARS-COV-2) (CORONAVIRUS DISEASE [COVID-19]), AMPLIFIED PROBE TECHNIQUE, MAKING USE OF HIGH THROUGHPUT TECHNOLOGIES AS DESCRIBED BY CMS-2020-01-R: HCPCS | Mod: 90 | Performed by: COLON & RECTAL SURGERY

## 2020-05-29 PROCEDURE — 99207 ZZC NO BILLABLE SERVICE THIS VISIT: CPT

## 2020-05-30 LAB
SARS-COV-2 RNA SPEC QL NAA+PROBE: NOT DETECTED
SPECIMEN SOURCE: NORMAL

## 2020-06-01 ENCOUNTER — HOSPITAL ENCOUNTER (OUTPATIENT)
Facility: CLINIC | Age: 45
Discharge: HOME | End: 2020-06-01
Attending: COLON & RECTAL SURGERY | Admitting: COLON & RECTAL SURGERY
Payer: COMMERCIAL

## 2020-06-01 VITALS
RESPIRATION RATE: 12 BRPM | BODY MASS INDEX: 41.78 KG/M2 | WEIGHT: 260 LBS | SYSTOLIC BLOOD PRESSURE: 139 MMHG | HEIGHT: 66 IN | HEART RATE: 77 BPM | OXYGEN SATURATION: 99 % | DIASTOLIC BLOOD PRESSURE: 87 MMHG

## 2020-06-01 LAB — COLONOSCOPY: NORMAL

## 2020-06-01 PROCEDURE — 25800030 ZZH RX IP 258 OP 636: Performed by: COLON & RECTAL SURGERY

## 2020-06-01 PROCEDURE — 88305 TISSUE EXAM BY PATHOLOGIST: CPT | Performed by: COLON & RECTAL SURGERY

## 2020-06-01 PROCEDURE — 25000128 H RX IP 250 OP 636: Performed by: COLON & RECTAL SURGERY

## 2020-06-01 PROCEDURE — G0500 MOD SEDAT ENDO SERVICE >5YRS: HCPCS | Performed by: COLON & RECTAL SURGERY

## 2020-06-01 PROCEDURE — 27210582 ZZH DEVICE CLIP RESOLUTION, EACH: Performed by: COLON & RECTAL SURGERY

## 2020-06-01 PROCEDURE — 45385 COLONOSCOPY W/LESION REMOVAL: CPT | Performed by: COLON & RECTAL SURGERY

## 2020-06-01 PROCEDURE — 45380 COLONOSCOPY AND BIOPSY: CPT | Performed by: COLON & RECTAL SURGERY

## 2020-06-01 PROCEDURE — 88305 TISSUE EXAM BY PATHOLOGIST: CPT | Mod: 26 | Performed by: COLON & RECTAL SURGERY

## 2020-06-01 RX ORDER — NALOXONE HYDROCHLORIDE 0.4 MG/ML
.1-.4 INJECTION, SOLUTION INTRAMUSCULAR; INTRAVENOUS; SUBCUTANEOUS
Status: DISCONTINUED | OUTPATIENT
Start: 2020-06-01 | End: 2020-06-01 | Stop reason: HOSPADM

## 2020-06-01 RX ORDER — FLUMAZENIL 0.1 MG/ML
0.2 INJECTION, SOLUTION INTRAVENOUS
Status: DISCONTINUED | OUTPATIENT
Start: 2020-06-01 | End: 2020-06-01 | Stop reason: HOSPADM

## 2020-06-01 RX ORDER — DIPHENHYDRAMINE HYDROCHLORIDE 50 MG/ML
25 INJECTION INTRAMUSCULAR; INTRAVENOUS EVERY 4 HOURS PRN
Status: DISCONTINUED | OUTPATIENT
Start: 2020-06-01 | End: 2020-06-01 | Stop reason: HOSPADM

## 2020-06-01 RX ORDER — ONDANSETRON 2 MG/ML
4 INJECTION INTRAMUSCULAR; INTRAVENOUS EVERY 6 HOURS PRN
Status: DISCONTINUED | OUTPATIENT
Start: 2020-06-01 | End: 2020-06-01 | Stop reason: HOSPADM

## 2020-06-01 RX ORDER — DIPHENHYDRAMINE HCL 25 MG
25 CAPSULE ORAL EVERY 4 HOURS PRN
Status: DISCONTINUED | OUTPATIENT
Start: 2020-06-01 | End: 2020-06-01 | Stop reason: HOSPADM

## 2020-06-01 RX ORDER — FENTANYL CITRATE 50 UG/ML
INJECTION, SOLUTION INTRAMUSCULAR; INTRAVENOUS PRN
Status: DISCONTINUED | OUTPATIENT
Start: 2020-06-01 | End: 2020-06-01 | Stop reason: HOSPADM

## 2020-06-01 RX ORDER — SODIUM CHLORIDE 9 MG/ML
INJECTION, SOLUTION INTRAVENOUS CONTINUOUS PRN
Status: DISCONTINUED | OUTPATIENT
Start: 2020-06-01 | End: 2020-06-01 | Stop reason: HOSPADM

## 2020-06-01 RX ORDER — ONDANSETRON 4 MG/1
4 TABLET, ORALLY DISINTEGRATING ORAL EVERY 6 HOURS PRN
Status: DISCONTINUED | OUTPATIENT
Start: 2020-06-01 | End: 2020-06-01 | Stop reason: HOSPADM

## 2020-06-01 RX ORDER — LIDOCAINE 40 MG/G
CREAM TOPICAL
Status: DISCONTINUED | OUTPATIENT
Start: 2020-06-01 | End: 2020-06-01 | Stop reason: HOSPADM

## 2020-06-01 RX ORDER — PROCHLORPERAZINE MALEATE 10 MG
10 TABLET ORAL EVERY 6 HOURS PRN
Status: DISCONTINUED | OUTPATIENT
Start: 2020-06-01 | End: 2020-06-01 | Stop reason: HOSPADM

## 2020-06-01 RX ORDER — ONDANSETRON 2 MG/ML
4 INJECTION INTRAMUSCULAR; INTRAVENOUS
Status: DISCONTINUED | OUTPATIENT
Start: 2020-06-01 | End: 2020-06-01 | Stop reason: HOSPADM

## 2020-06-01 ASSESSMENT — MIFFLIN-ST. JEOR: SCORE: 1836.1

## 2020-06-01 NOTE — H&P
Pre-Endoscopy History and Physical     Yahaira Ramirez MRN# [id number]   YOB: 1975 Age: 45 year old     Date of Procedure: 6/1/2020  Primary care provider: Marichuy Pendleton  Type of Endoscopy: colonoscopy  Reason for Procedure: change in bowel habits, history of rectal cancer  Type of Anesthesia Anticipated: Moderate Sedation    HPI:    Yahaira is a 45 year old female who will be undergoing the above procedure.      A history and physical has been performed. The patient's medications and allergies have been reviewed. The risks and benefits of the procedure including the risk of bleeding, perforation, and missed lesions as well as the sedation options and risks were discussed with the patient.  All questions were answered and informed consent was obtained.      Allergies   Allergen Reactions     Cats      Pollen Extract      Sulfa Drugs Hives     Adhesive Tape Itching and Rash        Current Facility-Administered Medications   Medication     lidocaine (LMX4) cream     lidocaine 1 % 0.1-1 mL     ondansetron (ZOFRAN) injection 4 mg     sodium chloride (PF) 0.9% PF flush 3 mL     sodium chloride (PF) 0.9% PF flush 3 mL     sodium chloride 0.9% infusion       Medications Prior to Admission   Medication Sig Dispense Refill Last Dose     buPROPion (WELLBUTRIN SR) 150 MG 12 hr tablet TAKE 1 TABLET BY MOUTH TWICE DAILY 60 tablet 0 6/1/2020 at Unknown time     citalopram (CELEXA) 40 MG tablet TAKE 1 TABLET BY MOUTH ONCE DAILY IN THE MORNING 30 tablet 0 6/1/2020 at Unknown time     levalbuterol (XOPENEX HFA) 45 MCG/ACT inhaler Inhale 1-2 puffs into the lungs every 4 hours as needed for shortness of breath / dyspnea. 1 Inhaler 3      levothyroxine (SYNTHROID) 137 MCG tablet Take 1 tablet (137 mcg) by mouth every morning 90 tablet 3 6/1/2020 at Unknown time     Loperamide HCl (IMODIUM A-D PO)    Past Week at Unknown time     ADVAIR DISKUS 100-50 MCG/DOSE inhaler         Methylcellulose, Laxative, (CITRUCEL PO)            Patient Active Problem List   Diagnosis     Hypertrophy of breast     CARDIOVASCULAR SCREENING; LDL GOAL LESS THAN 160     Acquired hypothyroidism     Mild intermittent asthma without complication     Major depressive disorder, recurrent episode, mild (H)     Mirena inserted : due for removal 2019 or 2021     Rectal cancer (H)     Morbid obesity due to excess calories (H)     Ileostomy status (H)     BMI 39.0-39.9,adult     Sarcoidosis        Past Medical History:   Diagnosis Date     ASCUS on Pap smear 1/23/03    + HPV     Asthma      ASTHMA - MILD INTERMITTENT 8/23/2005     Calculus of gallbladder without mention of cholecystitis or obstruction      CARDIOVASCULAR SCREENING; LDL GOAL LESS THAN 160 10/31/2010     Colon cancer (H)     chemo and radiation therapy started january 9 until february 11, 2017     Depressive disorder 2/22/2011     History of colposcopy with cervical biopsy 2/20/03, 7/23/03    SIMONE I & III, SIMONE I     HUMAN PAPILLOMAVIRUS NOS 3/24/2003    61-low risk     HYPOTHYROIDISM NOS 9/10/2002     Mild major depression (H) 2/22/2011     Other postablative hypothyroidism     iatrogenically induced hypothyroidism after I-31 for hyperthyroidism   abstracted [id number]     Rectal cancer (H) 4/5/2017     Renal stones     HX of        Past Surgical History:   Procedure Laterality Date     ABDOMEN SURGERY  4/5/17    Colon resection     C IUD,MIRENA  4/2016     CHOLECYSTECTOMY       COLONOSCOPY Left 12/9/2016    Procedure: COMBINED COLONOSCOPY, SINGLE OR MULTIPLE BIOPSY/POLYPECTOMY BY BIOPSY;  Surgeon: Claudette De La Paz MD;  Location:  GI     COLONOSCOPY N/A 8/14/2017    Procedure: COLONOSCOPY;  COLONOSCOPY ;  Surgeon: Yair Adkins MD;  Location:  GI     DAVINCI COLECTOMY N/A 4/5/2017    Procedure: DAVINCI XI COLECTOMY;  Surgeon: Yair Adkins MD;  Location:  OR     ENT SURGERY  03/2016    Tonsilectomy     EXCISE MASS UPPER EXTREMITY  12/21/2012    Procedure: EXCISE MASS UPPER EXTREMITY;   "Excision Subcutaneous Mass Right Tricep;  Surgeon: Mehul Maldonado MD;  Location:  OR     EYE SURGERY      Lasik     HC CAUTERY OF CERVIX, CRYOCAUTERY  3/23/03     MAMMOPLASTY REDUCTION BILATERAL  7/3/2014    Procedure: MAMMOPLASTY REDUCTION BILATERAL;  Surgeon: Yoanna Mccabe MD;  Location: [facility]     SIGMOIDOSCOPY FLEXIBLE N/A 3/21/2017    Procedure: SIGMOIDOSCOPY FLEXIBLE;  Surgeon: Yair Adkins MD;  Location:  GI     TAKEDOWN ILEOSTOMY N/A 2017    Procedure: TAKEDOWN ILEOSTOMY;  OPEN ILEOSTOMY REVERSAL ;  Surgeon: Yair Adkins MD;  Location:  OR       Social History     Tobacco Use     Smoking status: Former Smoker     Packs/day: 1.00     Years: 13.00     Pack years: 13.00     Types: Cigarettes     Last attempt to quit: 2006     Years since quittin.4     Smokeless tobacco: Never Used   Substance Use Topics     Alcohol use: No     Alcohol/week: 0.0 standard drinks     Comment: rare       Family History   Problem Relation Age of Onset     Cancer Maternal Grandfather         -lung     Cancer Paternal Grandfather              Hypertension Father      Diabetes Father 60        type 2     Lipids Maternal Grandmother      Cancer Paternal Grandmother         pancreatic     Hypertension Mother      Colon Cancer No family hx of          PHYSICAL EXAM:   /74   Pulse 79   Ht 1.676 m (5' 6\")   Wt 117.9 kg (260 lb)   SpO2 98%   BMI 41.97 kg/m   Estimated body mass index is 41.97 kg/m  as calculated from the following:    Height as of this encounter: 1.676 m (5' 6\").    Weight as of this encounter: 117.9 kg (260 lb).   Mental status - alert and oriented  RESP: lungs clear  CV: RRR  AIRWAY EXAM: Mallampatti Class II (visualization of the soft palate, fauces, and uvula)    IMPRESSION   ASA Class 3 - Severe systemic disease, but not incapacitating      Signed Electronically by: Yair Adkins MD  2020    Colorectal Surgery  [phone] " (office)  [phone] (pager)  [email or web address]

## 2020-06-02 LAB — COPATH REPORT: NORMAL

## 2020-06-04 ENCOUNTER — TELEPHONE (OUTPATIENT)
Dept: OBGYN | Facility: CLINIC | Age: 45
End: 2020-06-04

## 2020-06-04 DIAGNOSIS — Z12.31 VISIT FOR SCREENING MAMMOGRAM: Primary | ICD-10-CM

## 2020-06-04 NOTE — TELEPHONE ENCOUNTER
Pt calling, stating she needs a referral from us to have her mammogram covered by insurance.  She had it done at the breast center here on 5/15.  The order was placed by Dr Harman.  She is going to get a number to fax this to.    Joyce Hannah RN

## 2021-01-15 ENCOUNTER — HEALTH MAINTENANCE LETTER (OUTPATIENT)
Age: 46
End: 2021-01-15

## 2021-01-25 ENCOUNTER — AMBULATORY - HEALTHEAST (OUTPATIENT)
Dept: NURSING | Facility: CLINIC | Age: 46
End: 2021-01-25

## 2021-02-15 ENCOUNTER — AMBULATORY - HEALTHEAST (OUTPATIENT)
Dept: NURSING | Facility: CLINIC | Age: 46
End: 2021-02-15

## 2021-09-05 ENCOUNTER — HEALTH MAINTENANCE LETTER (OUTPATIENT)
Age: 46
End: 2021-09-05

## 2022-02-19 ENCOUNTER — HEALTH MAINTENANCE LETTER (OUTPATIENT)
Age: 47
End: 2022-02-19

## 2022-03-21 NOTE — IP AVS SNAPSHOT
[facility] [address] [facility]    [address] [place] [address]    Phone:  [phone]                                       After Visit Summary   9/6/2017    Yahaira Ramirez    MRN: [id number]           After Visit Summary Signature Page     I have received my discharge instructions, and my questions have been answered. I have discussed any challenges I see with this plan with the nurse or doctor.    ..........................................................................................................................................  Patient/Patient Representative Signature      ..........................................................................................................................................  Patient Representative Print Name and Relationship to Patient    ..................................................               ................................................  Date                                            Time    ..........................................................................................................................................  Reviewed by Signature/Title    ...................................................              ..............................................  Date                                                            Time           Consent 2/Introductory Paragraph: Mohs surgery was explained to the patient and consent was obtained. The risks, benefits and alternatives to therapy were discussed in detail. Specifically, the risks of infection, scarring, bleeding, prolonged wound healing, incomplete removal, allergy to anesthesia, nerve injury and recurrence were addressed. Prior to the procedure, the treatment site was clearly identified and confirmed by the patient. All components of Universal Protocol/PAUSE Rule completed.

## 2022-09-06 ENCOUNTER — HOSPITAL ENCOUNTER (EMERGENCY)
Facility: CLINIC | Age: 47
Discharge: HOME | End: 2022-09-06
Attending: EMERGENCY MEDICINE | Admitting: EMERGENCY MEDICINE
Payer: COMMERCIAL

## 2022-09-06 VITALS
RESPIRATION RATE: 18 BRPM | HEART RATE: 89 BPM | BODY MASS INDEX: 44.15 KG/M2 | HEIGHT: 65 IN | SYSTOLIC BLOOD PRESSURE: 151 MMHG | TEMPERATURE: 98.1 F | WEIGHT: 265 LBS | DIASTOLIC BLOOD PRESSURE: 98 MMHG | OXYGEN SATURATION: 97 %

## 2022-09-06 DIAGNOSIS — H81.392 PERIPHERAL VERTIGO INVOLVING LEFT EAR: ICD-10-CM

## 2022-09-06 LAB
ATRIAL RATE - MUSE: 84 BPM
DIASTOLIC BLOOD PRESSURE - MUSE: NORMAL MMHG
INTERPRETATION ECG - MUSE: NORMAL
P AXIS - MUSE: 30 DEGREES
PR INTERVAL - MUSE: 138 MS
QRS DURATION - MUSE: 78 MS
QT - MUSE: 382 MS
QTC - MUSE: 451 MS
R AXIS - MUSE: 6 DEGREES
SYSTOLIC BLOOD PRESSURE - MUSE: NORMAL MMHG
T AXIS - MUSE: 7 DEGREES
VENTRICULAR RATE- MUSE: 84 BPM

## 2022-09-06 PROCEDURE — 250N000013 HC RX MED GY IP 250 OP 250 PS 637: Performed by: EMERGENCY MEDICINE

## 2022-09-06 PROCEDURE — 99283 EMERGENCY DEPT VISIT LOW MDM: CPT

## 2022-09-06 PROCEDURE — 93005 ELECTROCARDIOGRAM TRACING: CPT | Performed by: EMERGENCY MEDICINE

## 2022-09-06 RX ORDER — LORAZEPAM 1 MG/1
1 TABLET ORAL ONCE
Status: COMPLETED | OUTPATIENT
Start: 2022-09-06 | End: 2022-09-06

## 2022-09-06 RX ORDER — MECLIZINE HCL 12.5 MG 12.5 MG/1
25 TABLET ORAL 3 TIMES DAILY PRN
Qty: 30 TABLET | Refills: 0 | Status: SHIPPED | OUTPATIENT
Start: 2022-09-06 | End: 2023-12-31

## 2022-09-06 RX ADMIN — LORAZEPAM 1 MG: 1 TABLET ORAL at 15:18

## 2022-09-06 ASSESSMENT — ACTIVITIES OF DAILY LIVING (ADL): ADLS_ACUITY_SCORE: 35

## 2022-09-06 NOTE — ED TRIAGE NOTES
Pt presents to the ED with c/o worsening dizziness, nausea, and CP since yesterday morning. Pt recently had a tube placed in left ear. Denies fevers. Denies double vision or weakness.

## 2022-09-06 NOTE — ED PROVIDER NOTES
EMERGENCY DEPARTMENT ENCOUNTER      NAME: Yahaira Ramirez  AGE: 47 year old female  YOB: 1975  MRN: [id number]  EVALUATION DATE & TIME: 9/6/2022  2:33 PM    PCP: Marichuy Pendleton    ED PROVIDER: Elias Duran M.D.      Chief Complaint   Patient presents with     Dizziness     Chest Pain     Nausea         FINAL IMPRESSION:  1. Peripheral vertigo involving left ear          ED COURSE & MEDICAL DECISION MAKING:    Pertinent Labs & Imaging studies reviewed. (See chart for details)  47 year old female presents to the Emergency Department for evaluation of positional dizziness.  Patient had tympanostomy tube placed yesterday after suppurative otitis diagnosis.  Later started developing dizziness.  Reports if she holds still and fixates on a spot she is okay.  Any movement she gets suddenly dizzy and feels like the room was spinning.  Also gets nauseated.  Exam reveals marked nystagmus with right lateral gaze with movement.  Tympanostomy tube in good position.  TM with slight injection.  Remainder of neurologic exam is negative.  Patient did have a chest discomfort earlier but was quite anxious about her symptoms.  Likely slight panic attack.  We will treat symptomatically with Ativan here both for dizziness and anxiety.  Meclizine will be used as home medication.  Patient encouraged to try [place]-Hallpike maneuver later to help with her symptoms.. Patient appears non toxic with stable vitals signs. Overall exam is benign.      2:49 PM DNP student met with the patient for initial history.  3:00 PM I met with the patient for the initial interview and physical examination. Discussed plan for treatment and workup in the ED.   3:11 PM We discussed plans for discharge including supportive cares, symptomatic treatment, outpatient follow up, and reasons to return to the emergency department.     At the conclusion of the encounter I discussed the results of all of the tests and the disposition. The questions were  "answered and return precautions provided. The patient or family acknowledged understanding and was agreeable with the care plan.       PPE: Provider wore gloves, eye protection, surgical cap, and paper mask.     MEDICATIONS GIVEN IN THE EMERGENCY:  Medications   LORazepam (ATIVAN) tablet 1 mg (1 mg Oral Given 9/6/22 1518)       NEW PRESCRIPTIONS STARTED AT TODAY'S ER VISIT  Discharge Medication List as of 9/6/2022  3:09 PM      START taking these medications    Details   meclizine (ANTIVERT) 12.5 MG tablet Take 2 tablets (25 mg) by mouth 3 times daily as needed for dizziness, Disp-30 tablet, R-0, Local Print                =================================================================    HPI    Patient information was obtained from: patient     Use of Intrepreter: N/A       Yahaira Ramirez is a 47 year old female with a pertient medical history of asthma colon cancer who presents to the ED for evaluation of dizziness.    On 9/2, the patient had a tube placed in her left ear due to ear infections/hearing trouble for 1.5 months. Her hearing has improved and was doing well until yesterday. At 10:00 AM yesterday. she developed room spinning dizziness, a headache and nausea. This morning, patient developed chest pain and shortness of breath but thinks this could be due to her anxiety. Patient describes the pain as a \"tightness\" and it radiates into her back. She was seen by her ENT who prescribed her Famotidine and recommended a CT scan. No one contacted the patient so she left and came here. Patient's nausea, shortness of breath, headache and chest pain are improving. Denies palpitations, vomiting, numbness, tingling, focal weakness, and any other complaints.    Of note, patient has a history of anxiety and panic attacks.      REVIEW OF SYSTEMS   Constitutional:  Denies fever, chills  Respiratory:  Denies productive cough Positive for shortness of breath (improving)  Cardiovascular:  Denies palpitations Positive " for chest pain (improving)  GI:  Denies abdominal pain, vomiting, or change in bowel or bladder habits Positive for nausea (improving)  Musculoskeletal:  Denies any new muscle/joint swelling Positive for back pain (secondary to chest pain)  Skin:  Denies rash   Neurologic:  Denies focal weakness, numbness, tingling Positive for room spinning dizziness, headache (improving)  All systems negative except as marked.     PAST MEDICAL HISTORY:  Past Medical History:   Diagnosis Date     ASCUS on Pap smear 1/23/03    + HPV     Asthma      ASTHMA - MILD INTERMITTENT 8/23/2005     Calculus of gallbladder without mention of cholecystitis or obstruction      CARDIOVASCULAR SCREENING; LDL GOAL LESS THAN 160 10/31/2010     Colon cancer (H)     chemo and radiation therapy started january 9 until february 11, 2017     Depressive disorder 2/22/2011     History of colposcopy with cervical biopsy 2/20/03, 7/23/03    SIMONE I & III, SIMONE I     HUMAN PAPILLOMAVIRUS NOS 3/24/2003    61-low risk     HYPOTHYROIDISM NOS 9/10/2002     Mild major depression (H) 2/22/2011     Other postablative hypothyroidism     iatrogenically induced hypothyroidism after I-31 for hyperthyroidism   abstracted [id number]     Rectal cancer (H) 4/5/2017     Renal stones     HX of       PAST SURGICAL HISTORY:  Past Surgical History:   Procedure Laterality Date     ABDOMEN SURGERY  4/5/17    Colon resection     C IUD,MIRENA  4/2016     CHOLECYSTECTOMY       COLONOSCOPY Left 12/9/2016    Procedure: COMBINED COLONOSCOPY, SINGLE OR MULTIPLE BIOPSY/POLYPECTOMY BY BIOPSY;  Surgeon: Claudette De La Paz MD;  Location:  GI     COLONOSCOPY N/A 8/14/2017    Procedure: COLONOSCOPY;  COLONOSCOPY ;  Surgeon: Yair Adkins MD;  Location: [facility]     COLONOSCOPY N/A 6/1/2020    Procedure: COLONOSCOPY, WITH POLYPECTOMY AND BIOPSY;  Surgeon: Yair Adkins MD;  Location: [facility]     DAVINCI COLECTOMY N/A 4/5/2017    Procedure: DAVINCI XI COLECTOMY;  Surgeon: Yair Adkins MD;   Location:  OR     ENT SURGERY  03/2016    Tonsilectomy     EXCISE MASS UPPER EXTREMITY  12/21/2012    Procedure: EXCISE MASS UPPER EXTREMITY;  Excision Subcutaneous Mass Right Tricep;  Surgeon: Mehul Maldonado MD;  Location:  OR     EYE SURGERY      Lasik     HC CAUTERY OF CERVIX, CRYOCAUTERY  3/23/03     MAMMOPLASTY REDUCTION BILATERAL  7/3/2014    Procedure: MAMMOPLASTY REDUCTION BILATERAL;  Surgeon: Yoanna Mccabe MD;  Location:  SD     SIGMOIDOSCOPY FLEXIBLE N/A 3/21/2017    Procedure: SIGMOIDOSCOPY FLEXIBLE;  Surgeon: Yair Adkins MD;  Location:  GI     TAKEDOWN ILEOSTOMY N/A 9/6/2017    Procedure: TAKEDOWN ILEOSTOMY;  OPEN ILEOSTOMY REVERSAL ;  Surgeon: Yair Adkins MD;  Location:  OR         CURRENT MEDICATIONS:    No current facility-administered medications for this encounter.    Current Outpatient Medications:      meclizine (ANTIVERT) 12.5 MG tablet, Take 2 tablets (25 mg) by mouth 3 times daily as needed for dizziness, Disp: 30 tablet, Rfl: 0     ADVAIR DISKUS 100-50 MCG/DOSE inhaler, , Disp: , Rfl:      buPROPion (WELLBUTRIN SR) 150 MG 12 hr tablet, TAKE 1 TABLET BY MOUTH TWICE DAILY, Disp: 60 tablet, Rfl: 0     citalopram (CELEXA) 40 MG tablet, TAKE 1 TABLET BY MOUTH ONCE DAILY IN THE MORNING, Disp: 30 tablet, Rfl: 0     levalbuterol (XOPENEX HFA) 45 MCG/ACT inhaler, Inhale 1-2 puffs into the lungs every 4 hours as needed for shortness of breath / dyspnea., Disp: 1 Inhaler, Rfl: 3     levothyroxine (SYNTHROID) 137 MCG tablet, Take 1 tablet (137 mcg) by mouth every morning, Disp: 90 tablet, Rfl: 3     Loperamide HCl (IMODIUM A-D PO), , Disp: , Rfl:      Methylcellulose, Laxative, (CITRUCEL PO), , Disp: , Rfl:     ALLERGIES:  Allergies   Allergen Reactions     Amoxicillin      Cats      Pollen Extract      Sulfa Drugs Hives     Adhesive Tape Itching and Rash       FAMILY HISTORY:  Family History   Problem Relation Age of Onset     Cancer Maternal Grandfather          "-lung     Cancer Paternal Grandfather              Hypertension Father      Diabetes Father 60        type 2     Lipids Maternal Grandmother      Cancer Paternal Grandmother         pancreatic     Hypertension Mother      Colon Cancer No family hx of        SOCIAL HISTORY:   Social History     Socioeconomic History     Marital status: Single     Spouse name: None     Number of children: None     Years of education: None     Highest education level: None   Tobacco Use     Smoking status: Former Smoker     Packs/day: 1.00     Years: 13.00     Pack years: 13.00     Types: Cigarettes     Quit date: 2006     Years since quitting: 15.7     Smokeless tobacco: Never Used   Substance and Sexual Activity     Alcohol use: No     Alcohol/week: 0.0 standard drinks     Comment: rare     Drug use: No     Sexual activity: Not Currently     Partners: Male     Birth control/protection: I.U.D.   Other Topics Concern     Parent/sibling w/ CABG, MI or angioplasty before 65F 55M? No       VITALS:  Patient Vitals for the past 24 hrs:   BP Temp Temp src Pulse Resp SpO2 Height Weight   22 1515 -- -- -- 89 -- 97 % -- --   22 1402 (!) 151/98 98.1  F (36.7  C) Temporal 83 18 100 % 1.651 m (5' 5\") 120.2 kg (265 lb)        PHYSICAL EXAM    Constitutional:  Awake, alert, in no apparent distress  HENT:  Normocephalic, Atraumatic. Oropharynx moist. Nose normal. Neck- Normal range of motion with no guarding, No midline cervical tenderness, Supple, No stridor. Tympanostomy tube in place in the left ear. Left ear mildly injected.  Eyes:  PERRL, EOMI with no signs of entrapment, Conjunctiva normal, No discharge.   Respiratory:  Normal breath sounds, No respiratory distress, No wheezing.    Cardiovascular:  Normal heart rate, Normal rhythm, No appreciable rubs or gallops.   GI:  Soft, No tenderness, No distension, No palpable masses  Musculoskeletal:   No edema. Good range of motion in all major joints.   Integument:  " Warm, Dry, No erythema, No rash.   Neurologic:  Alert & oriented, Normal motor function, Normal sensory function, No focal deficits noted. Marked nystagmus with right lateral gaze.  Psychiatric:  Affect normal, Judgment normal, Mood normal.     LAB:  All pertinent labs reviewed and interpreted.  Results for orders placed or performed during the hospital encounter of 09/06/22   ECG 12-LEAD WITH MUSE (LHE)   Result Value Ref Range    Systolic Blood Pressure  mmHg    Diastolic Blood Pressure  mmHg    Ventricular Rate 84 BPM    Atrial Rate 84 BPM    AR Interval 138 ms    QRS Duration 78 ms     ms    QTc 451 ms    P Axis 30 degrees    R AXIS 6 degrees    T Axis 7 degrees    Interpretation ECG       Sinus rhythm  Nonspecific T wave abnormality  Abnormal ECG  When compared with ECG of 07-APR-2017 06:51,  Nonspecific T wave abnormality now evident in Anterior leads  Confirmed by SEE ED PROVIDER NOTE FOR, ECG INTERPRETATION ([id number]),  Keira Olsen ([id number]) on 9/6/2022 2:40:51 PM           EKG:    Normal sinus rhythm.  Nonspecific ST segment flattening laterally.  When compared to April 7, 2017 essentially unchanged.  I have independently reviewed and interpreted the EKG(s) documented above.          I, Nel Zambrano, am serving as a scribe to document services personally performed by Elias Duran MD, based on my observation and the provider's statements to me. I, Elias Duran MD attest that Nel Zambrano is acting in a scribe capacity, has observed my performance of the services and has documented them in accordance with my direction.    Elias Duran M.D.  Emergency Medicine  [facility] EMERGENCY ROOM       Elias Duran MD  09/06/22 [id number]

## 2022-10-18 ENCOUNTER — LAB (OUTPATIENT)
Dept: LAB | Facility: CLINIC | Age: 47
End: 2022-10-18
Payer: COMMERCIAL

## 2022-10-18 DIAGNOSIS — E05.00 GRAVES' DISEASE: Primary | ICD-10-CM

## 2022-10-18 PROCEDURE — 36415 COLL VENOUS BLD VENIPUNCTURE: CPT

## 2022-10-18 PROCEDURE — 84445 ASSAY OF TSI GLOBULIN: CPT

## 2022-10-20 LAB — TSI SER-ACNC: 1.3 TSI INDEX

## 2022-10-22 ENCOUNTER — HEALTH MAINTENANCE LETTER (OUTPATIENT)
Age: 47
End: 2022-10-22

## 2023-11-05 ENCOUNTER — HEALTH MAINTENANCE LETTER (OUTPATIENT)
Age: 48
End: 2023-11-05

## 2023-12-31 ENCOUNTER — HOSPITAL ENCOUNTER (EMERGENCY)
Facility: CLINIC | Age: 48
Discharge: HOME | End: 2023-12-31
Attending: STUDENT IN AN ORGANIZED HEALTH CARE EDUCATION/TRAINING PROGRAM | Admitting: STUDENT IN AN ORGANIZED HEALTH CARE EDUCATION/TRAINING PROGRAM
Payer: COMMERCIAL

## 2023-12-31 VITALS
HEART RATE: 95 BPM | SYSTOLIC BLOOD PRESSURE: 133 MMHG | HEIGHT: 65 IN | RESPIRATION RATE: 19 BRPM | OXYGEN SATURATION: 100 % | TEMPERATURE: 97.4 F | WEIGHT: 258 LBS | DIASTOLIC BLOOD PRESSURE: 82 MMHG | BODY MASS INDEX: 42.99 KG/M2

## 2023-12-31 DIAGNOSIS — R11.2 CHEMOTHERAPY INDUCED NAUSEA AND VOMITING: ICD-10-CM

## 2023-12-31 DIAGNOSIS — T45.1X5A CHEMOTHERAPY INDUCED NAUSEA AND VOMITING: ICD-10-CM

## 2023-12-31 PROBLEM — C78.02 MALIGNANT NEOPLASM METASTATIC TO LEFT LUNG (H): Status: ACTIVE | Noted: 2023-11-14

## 2023-12-31 LAB
ALBUMIN SERPL BCG-MCNC: 4.4 G/DL (ref 3.5–5.2)
ALP SERPL-CCNC: 95 U/L (ref 40–150)
ALT SERPL W P-5'-P-CCNC: 68 U/L (ref 0–50)
ANION GAP SERPL CALCULATED.3IONS-SCNC: 9 MMOL/L (ref 7–15)
AST SERPL W P-5'-P-CCNC: 30 U/L (ref 0–45)
ATRIAL RATE - MUSE: 131 BPM
BASOPHILS # BLD AUTO: 0 10E3/UL (ref 0–0.2)
BASOPHILS NFR BLD AUTO: 0 %
BILIRUB SERPL-MCNC: 0.8 MG/DL
BUN SERPL-MCNC: 23.7 MG/DL (ref 6–20)
CALCIUM SERPL-MCNC: 9.3 MG/DL (ref 8.6–10)
CHLORIDE SERPL-SCNC: 100 MMOL/L (ref 98–107)
CREAT SERPL-MCNC: 0.88 MG/DL (ref 0.51–0.95)
DEPRECATED HCO3 PLAS-SCNC: 25 MMOL/L (ref 22–29)
DIASTOLIC BLOOD PRESSURE - MUSE: 85 MMHG
EGFRCR SERPLBLD CKD-EPI 2021: 80 ML/MIN/1.73M2
EOSINOPHIL # BLD AUTO: 0.3 10E3/UL (ref 0–0.7)
EOSINOPHIL NFR BLD AUTO: 9 %
ERYTHROCYTE [DISTWIDTH] IN BLOOD BY AUTOMATED COUNT: 13.2 % (ref 10–15)
GLUCOSE SERPL-MCNC: 117 MG/DL (ref 70–99)
HCT VFR BLD AUTO: 40.7 % (ref 35–47)
HGB BLD-MCNC: 13.8 G/DL (ref 11.7–15.7)
HOLD SPECIMEN: NORMAL
IMM GRANULOCYTES # BLD: 0 10E3/UL
IMM GRANULOCYTES NFR BLD: 1 %
INTERPRETATION ECG - MUSE: NORMAL
LACTATE SERPL-SCNC: 1.5 MMOL/L (ref 0.7–2)
LYMPHOCYTES # BLD AUTO: 1 10E3/UL (ref 0.8–5.3)
LYMPHOCYTES NFR BLD AUTO: 34 %
MAGNESIUM SERPL-MCNC: 2 MG/DL (ref 1.7–2.3)
MCH RBC QN AUTO: 31.6 PG (ref 26.5–33)
MCHC RBC AUTO-ENTMCNC: 33.9 G/DL (ref 31.5–36.5)
MCV RBC AUTO: 93 FL (ref 78–100)
MONOCYTES # BLD AUTO: 0.1 10E3/UL (ref 0–1.3)
MONOCYTES NFR BLD AUTO: 2 %
NEUTROPHILS # BLD AUTO: 1.5 10E3/UL (ref 1.6–8.3)
NEUTROPHILS NFR BLD AUTO: 54 %
NRBC # BLD AUTO: 0 10E3/UL
NRBC BLD AUTO-RTO: 0 /100
P AXIS - MUSE: 48 DEGREES
PLATELET # BLD AUTO: 287 10E3/UL (ref 150–450)
POTASSIUM SERPL-SCNC: 3.8 MMOL/L (ref 3.4–5.3)
PR INTERVAL - MUSE: 136 MS
PROT SERPL-MCNC: 7.2 G/DL (ref 6.4–8.3)
QRS DURATION - MUSE: 76 MS
QT - MUSE: 286 MS
QTC - MUSE: 422 MS
R AXIS - MUSE: -5 DEGREES
RBC # BLD AUTO: 4.37 10E6/UL (ref 3.8–5.2)
SODIUM SERPL-SCNC: 134 MMOL/L (ref 135–145)
SYSTOLIC BLOOD PRESSURE - MUSE: 148 MMHG
T AXIS - MUSE: 30 DEGREES
VENTRICULAR RATE- MUSE: 131 BPM
WBC # BLD AUTO: 2.9 10E3/UL (ref 4–11)

## 2023-12-31 PROCEDURE — 82040 ASSAY OF SERUM ALBUMIN: CPT | Performed by: STUDENT IN AN ORGANIZED HEALTH CARE EDUCATION/TRAINING PROGRAM

## 2023-12-31 PROCEDURE — 250N000011 HC RX IP 250 OP 636: Performed by: STUDENT IN AN ORGANIZED HEALTH CARE EDUCATION/TRAINING PROGRAM

## 2023-12-31 PROCEDURE — 93005 ELECTROCARDIOGRAM TRACING: CPT | Performed by: STUDENT IN AN ORGANIZED HEALTH CARE EDUCATION/TRAINING PROGRAM

## 2023-12-31 PROCEDURE — 83735 ASSAY OF MAGNESIUM: CPT | Performed by: STUDENT IN AN ORGANIZED HEALTH CARE EDUCATION/TRAINING PROGRAM

## 2023-12-31 PROCEDURE — 83605 ASSAY OF LACTIC ACID: CPT | Performed by: EMERGENCY MEDICINE

## 2023-12-31 PROCEDURE — 258N000003 HC RX IP 258 OP 636: Performed by: EMERGENCY MEDICINE

## 2023-12-31 PROCEDURE — 85025 COMPLETE CBC W/AUTO DIFF WBC: CPT | Performed by: STUDENT IN AN ORGANIZED HEALTH CARE EDUCATION/TRAINING PROGRAM

## 2023-12-31 PROCEDURE — 96361 HYDRATE IV INFUSION ADD-ON: CPT

## 2023-12-31 PROCEDURE — 99284 EMERGENCY DEPT VISIT MOD MDM: CPT | Mod: 25

## 2023-12-31 PROCEDURE — 96372 THER/PROPH/DIAG INJ SC/IM: CPT | Mod: XS | Performed by: EMERGENCY MEDICINE

## 2023-12-31 PROCEDURE — 36415 COLL VENOUS BLD VENIPUNCTURE: CPT | Performed by: EMERGENCY MEDICINE

## 2023-12-31 PROCEDURE — 250N000011 HC RX IP 250 OP 636: Performed by: EMERGENCY MEDICINE

## 2023-12-31 PROCEDURE — 93005 ELECTROCARDIOGRAM TRACING: CPT | Performed by: EMERGENCY MEDICINE

## 2023-12-31 PROCEDURE — 258N000003 HC RX IP 258 OP 636: Performed by: STUDENT IN AN ORGANIZED HEALTH CARE EDUCATION/TRAINING PROGRAM

## 2023-12-31 PROCEDURE — 96374 THER/PROPH/DIAG INJ IV PUSH: CPT

## 2023-12-31 RX ORDER — OLANZAPINE 2.5 MG/1
2.5 TABLET, FILM COATED ORAL SEE ADMIN INSTRUCTIONS
COMMUNITY
Start: 2023-12-11 | End: 2024-01-01

## 2023-12-31 RX ORDER — BUPROPION HYDROCHLORIDE 300 MG/1
300 TABLET ORAL EVERY MORNING
COMMUNITY
Start: 2023-12-22

## 2023-12-31 RX ORDER — HYDROMORPHONE HYDROCHLORIDE 2 MG/1
2-4 TABLET ORAL EVERY 6 HOURS PRN
COMMUNITY
Start: 2023-11-17 | End: 2023-12-31

## 2023-12-31 RX ORDER — OLANZAPINE 5 MG/1
5 TABLET, ORALLY DISINTEGRATING ORAL DAILY
Qty: 8 TABLET | Refills: 0 | Status: SHIPPED | OUTPATIENT
Start: 2023-12-31 | End: 2024-01-04

## 2023-12-31 RX ORDER — IBUPROFEN 600 MG/1
600 TABLET, FILM COATED ORAL EVERY 6 HOURS PRN
COMMUNITY
Start: 2023-11-17 | End: 2023-12-31

## 2023-12-31 RX ORDER — HEPARIN SODIUM (PORCINE) LOCK FLUSH IV SOLN 100 UNIT/ML 100 UNIT/ML
5-10 SOLUTION INTRAVENOUS
Status: DISCONTINUED | OUTPATIENT
Start: 2023-12-31 | End: 2023-12-31 | Stop reason: HOSPADM

## 2023-12-31 RX ORDER — ACETAMINOPHEN 500 MG
1000 TABLET ORAL EVERY 6 HOURS PRN
COMMUNITY
Start: 2023-11-17

## 2023-12-31 RX ORDER — ONDANSETRON 2 MG/ML
8 INJECTION INTRAMUSCULAR; INTRAVENOUS ONCE
Status: COMPLETED | OUTPATIENT
Start: 2023-12-31 | End: 2023-12-31

## 2023-12-31 RX ORDER — ALBUTEROL SULFATE 90 UG/1
1-2 AEROSOL, METERED RESPIRATORY (INHALATION) EVERY 4 HOURS PRN
COMMUNITY
Start: 2022-05-06

## 2023-12-31 RX ORDER — CLINDAMYCIN PHOSPHATE 10 UG/ML
LOTION TOPICAL 2 TIMES DAILY
COMMUNITY
Start: 2023-10-10 | End: 2023-12-31

## 2023-12-31 RX ORDER — OLANZAPINE 10 MG/2ML
5 INJECTION, POWDER, FOR SOLUTION INTRAMUSCULAR ONCE
Status: COMPLETED | OUTPATIENT
Start: 2023-12-31 | End: 2023-12-31

## 2023-12-31 RX ORDER — ONDANSETRON 8 MG/1
8 TABLET, ORALLY DISINTEGRATING ORAL EVERY 8 HOURS PRN
COMMUNITY
Start: 2023-12-13

## 2023-12-31 RX ORDER — HEPARIN SODIUM,PORCINE 10 UNIT/ML
5-10 VIAL (ML) INTRAVENOUS
Status: DISCONTINUED | OUTPATIENT
Start: 2023-12-31 | End: 2023-12-31 | Stop reason: HOSPADM

## 2023-12-31 RX ORDER — HEPARIN SODIUM,PORCINE 10 UNIT/ML
5-10 VIAL (ML) INTRAVENOUS EVERY 24 HOURS
Status: DISCONTINUED | OUTPATIENT
Start: 2023-12-31 | End: 2023-12-31 | Stop reason: HOSPADM

## 2023-12-31 RX ORDER — PROCHLORPERAZINE MALEATE 5 MG
5-10 TABLET ORAL EVERY 6 HOURS PRN
COMMUNITY
Start: 2023-12-11

## 2023-12-31 RX ORDER — DEXAMETHASONE 4 MG/1
1 TABLET ORAL SEE ADMIN INSTRUCTIONS
COMMUNITY
Start: 2023-12-13 | End: 2023-12-31

## 2023-12-31 RX ORDER — FLUTICASONE PROPIONATE AND SALMETEROL 50; 250 UG/1; UG/1
1 POWDER RESPIRATORY (INHALATION) 2 TIMES DAILY
COMMUNITY
Start: 2023-10-27

## 2023-12-31 RX ORDER — SPIRONOLACTONE 100 MG/1
100 TABLET, FILM COATED ORAL DAILY
COMMUNITY
Start: 2023-09-14

## 2023-12-31 RX ORDER — LORAZEPAM 0.5 MG/1
0.5 TABLET ORAL EVERY 4 HOURS PRN
COMMUNITY
Start: 2023-12-13

## 2023-12-31 RX ORDER — CHLORAL HYDRATE 500 MG
2 CAPSULE ORAL DAILY
COMMUNITY

## 2023-12-31 RX ADMIN — OLANZAPINE 5 MG: 10 INJECTION, POWDER, FOR SOLUTION INTRAMUSCULAR at 11:40

## 2023-12-31 RX ADMIN — SODIUM CHLORIDE 1000 ML: 9 INJECTION, SOLUTION INTRAVENOUS at 12:55

## 2023-12-31 RX ADMIN — HEPARIN 5 ML: 100 SYRINGE at 15:05

## 2023-12-31 RX ADMIN — SODIUM CHLORIDE 1000 ML: 9 INJECTION, SOLUTION INTRAVENOUS at 11:40

## 2023-12-31 RX ADMIN — ONDANSETRON 8 MG: 2 INJECTION INTRAMUSCULAR; INTRAVENOUS at 11:04

## 2023-12-31 ASSESSMENT — ENCOUNTER SYMPTOMS
ABDOMINAL PAIN: 0
SHORTNESS OF BREATH: 0
FEVER: 0
BLOOD IN STOOL: 0
NAUSEA: 1
DIARRHEA: 1
CHILLS: 0
VOMITING: 1

## 2023-12-31 ASSESSMENT — ACTIVITIES OF DAILY LIVING (ADL)
ADLS_ACUITY_SCORE: 35

## 2023-12-31 NOTE — ED TRIAGE NOTES
Patient has nausea, vomiting, diarrhea since chemo on Tuesday. Has been taking multiple nausea medications at home without improvement.

## 2023-12-31 NOTE — PHARMACY-ADMISSION MEDICATION HISTORY
Pharmacist Admission Medication History    Admission medication history is complete. The information provided in this note is only as accurate as the sources available at the time of the update.    Information Source(s): Patient, Patient's pharmacy, and Clinic records via in-person    Pertinent Information: Had her chemo on 12/26, has taken the olanzapine x 4 days of each cycle, was given a dose of dex on 12/28 in clinic.  Has been using ondansetron, lorazepam and prochlorperazine to help control the N/V    Changes made to PTA medication list:  Added: None  Deleted: hydromorphone, ibuoprofen  Changed: None    Medication Affordability:  Not including over the counter (OTC) medications, was there a time in the past 3 months when you did not take your medications as prescribed because of cost?: No    Allergies reviewed with patient and updates made in EHR: yes    Medication History Completed By: Viajy Momin RPH 12/31/2023 11:09 AM    PTA Med List   Medication Sig Last Dose    acetaminophen (TYLENOL) 500 MG tablet Take 1,000 mg by mouth every 6 hours as needed for mild pain Unknown at PRN    ADVAIR DISKUS 250-50 MCG/ACT inhaler Inhale 1 puff into the lungs 2 times daily 12/31/2023 at 0800    albuterol (PROAIR HFA/PROVENTIL HFA/VENTOLIN HFA) 108 (90 Base) MCG/ACT inhaler Inhale 1-2 puffs into the lungs every 4 hours as needed for shortness of breath Unknown at PRN    ascorbic acid 1000 MG TABS tablet Take 1,000 mg by mouth 2 times daily 12/30/2023 at 2000    buPROPion (WELLBUTRIN XL) 300 MG 24 hr tablet Take 300 mg by mouth every morning 12/31/2023 at 0800    [place] 500 MG CAPS Take 2 tablets by mouth daily 12/30/2023 at 0800    fish oil-omega-3 fatty acids 1000 MG capsule Take 2 g by mouth 2 times daily 12/30/2023 at 2000    levothyroxine (SYNTHROID) 137 MCG tablet Take 1 tablet (137 mcg) by mouth every morning 12/31/2023 at 0800    LORazepam (ATIVAN) 0.5 MG tablet Take 0.5 mg by mouth every 4 hours as needed for  nausea 12/31/2023 at 0800    OLANZapine (ZYPREXA) 2.5 MG tablet Take 2.5 mg by mouth See Admin Instructions Take on days 1-4 of each chemo cycle 12/29/2023 at 0800    ondansetron (ZOFRAN ODT) 8 MG ODT tab Take 8 mg by mouth every 8 hours as needed for nausea 12/31/2023 at 0800    prochlorperazine (COMPAZINE) 5 MG tablet Take 5-10 mg by mouth every 8 hours as needed for nausea or vomiting 12/30/2023 at x 1    sertraline (ZOLOFT) 50 MG tablet Take 50 mg by mouth every morning 12/31/2023 at 0800    spironolactone (ALDACTONE) 100 MG tablet Take 100 mg by mouth daily 12/31/2023 at 0800    vitamin D3 (CHOLECALCIFEROL) 125 MCG (5000 UT) tablet Take 5,000 Units by mouth daily 12/30/2023 at 0800

## 2023-12-31 NOTE — DISCHARGE INSTRUCTIONS
You were seen here today for evaluation of nausea and vomiting. Your lab work today was reassuring with no evidence of severe dehydration, sepsis, or liver/kidney dysfunction.     Take zyprexa once daily for the next four days. I gave you an extra four days' worth if needed.    Drink fluids and advance your diet slowly. Take Imodium for diarrhea.    Follow up with your oncology team for recheck. Return here for any new or worsening symptoms including persistent vomiting, fever, passing out, chest pain, sob, severe pain, or any other symptoms that concern you.

## 2023-12-31 NOTE — ED PROVIDER NOTES
EMERGENCY DEPARTMENT ENCOUNTER      NAME: Yahaira Ramirez  AGE: 48 year old female  YOB: 1975  MRN: [id number]  EVALUATION DATE & TIME: No admission date for patient encounter.    PCP: Zheng Way    ED PROVIDER: Maite Brown PA-C      Chief Complaint   Patient presents with    Vomiting    Diarrhea         FINAL IMPRESSION:  1. Chemotherapy induced nausea and vomiting          ED COURSE & MEDICAL DECISION MAKING:    Pertinent Labs & Imaging studies reviewed. (See chart for details)    48 year old female presents to the Emergency Department for evaluation of vomiting and diarrhea.    Physical exam is remarkable for an ill but nontoxic-appearing female.  Heart and lung sounds are clear diffusely throughout, tachycardia noted.  Abdomen is soft and nontender.  Dry lips and mucous membranes.  Vital signs remarkable for tachycardia which improved after symptom control and IV hydration, otherwise stable and she is afebrile.    CBC is remarkable for a mildly decreased white blood cell count of 2.9 and absolute neutrophil count of 1.5 but otherwise unremarkable.  CMP unremarkable with no significant electrolyte derangements, normal liver and kidney function.  Lactic acid within normal limits.  Magnesium within normal limits.  EKG obtained to evaluate QTc which is normal.    The patient was given 2 L of IV fluids, IV Zofran, and IV Zyprexa with improvement in her symptoms.  I do not think any further emergent labs or imaging are indicated at this time.  She is hemodynamically stable here and was able to tolerate oral food and fluids prior to discharge home.  She has a benign abdominal exam and denies any significant pain so I do not think imaging of the abdomen is indicated.  I spoke to the on-call physician with [facility] and they recommend increasing her home Zyprexa dose as these are likely related to her chemotherapy.  Recommend follow-up with them, prescription for Zyprexa  sent to her pharmacy and advised return here for any new or worsening symptoms.  The patient is agreeable with this treatment plan and verbalized understanding. Care discussed with staff physician Dr. Julio Fleming who is agreeable with this treatment plan and verbalized understanding.       Medical Decision Making    History:  Supplemental history from: Family Member/Significant Other  External Record(s) reviewed: Outpatient Record: OP visit to radiology 11/22/23    Work Up:  Chart documentation includes differential considered and any EKGs or imaging independently interpreted by provider, where specified.  In additional to work up documented, I considered the following work up: Imaging XR, but deferred due to benign abdominal exam and reassuring labs.    External consultation:  Discussion of management with another provider: Hematology - Oncology    Complicating factors:  Care impacted by chronic illness: Cancer/Chemotherapy  Care affected by social determinants of health: N/A    Disposition considerations: Discharge. I prescribed additional prescription strength medication(s) as charted. I considered admission, but discharged patient after significant clinical improvement.    ED Course   10:29 AM Performed my initial history and physical exam. Discussed workup in the emergency department, management of symptoms, and likely disposition.   1:30 PM Rechecked patient, she is feeling improved.   2:53 PM Discussed with [facility]. I discussed the plan for discharge with the patient or family and they are agreeable.. We discussed supportive cares at home and reasons for return to the ER including new or worsening symptoms - all questions and concerns addressed. Patient to be discharged by RN.    At the conclusion of the encounter I discussed the results of all of the tests and the disposition. The questions were answered. The patient or family acknowledged understanding and was agreeable with the care plan.     Voice  recognition software was used in the creation of this note. Any grammatical or nonsensical errors are due to inherent errors with the software and are not the intention of the writer.     MEDICATIONS GIVEN IN THE EMERGENCY:  Medications   sodium chloride (PF) 0.9% PF flush 10-20 mL (has no administration in time range)   sodium chloride (PF) 0.9% PF flush 10-20 mL (has no administration in time range)   sodium chloride (PF) 0.9% PF flush 10-20 mL (has no administration in time range)   heparin lock flush 10 UNIT/ML injection 5-10 mL (has no administration in time range)   heparin lock flush 10 UNIT/ML injection 5-10 mL (has no administration in time range)   heparin 100 unit/mL injection 5-10 mL (5 mLs Intracatheter $Given 12/31/23 1505)   sodium chloride 0.9% BOLUS 1,000 mL (0 mLs Intravenous Stopped 12/31/23 1255)   ondansetron (ZOFRAN) injection 8 mg (8 mg Intravenous $Given 12/31/23 1104)   OLANZapine (zyPREXA) injection 5 mg (5 mg Intramuscular $Given 12/31/23 1140)   sodium chloride 0.9% BOLUS 1,000 mL (0 mLs Intravenous Stopped 12/31/23 1457)       NEW PRESCRIPTIONS STARTED AT TODAY'S ER VISIT  Discharge Medication List as of 12/31/2023  3:11 PM        START taking these medications    Details   OLANZapine zydis (ZYPREXA) 5 MG ODT Take 1 tablet (5 mg) by mouth daily for 4 days, Disp-8 tablet, R-0, E-Prescribe                  =================================================================    HPI    Patient information was obtained from: Patient    Use of : N/A         Yahaira Ramirez is a 48 year old female with PMH of asthma, colorectal cancer with mets to lung, hypothyroidism who presents to the ED via walk-in for evaluation of vomiting and nausea.    The patient reports that she had  her second round chemotherapy 5 days ago and since then, she has not been able to tolerate any food or fluids.  She reports every time she tries to eat or drink anything, she vomits or dry heaves.  She has been  having multiple episodes of diarrhea per day despite taking Imodium.  She has tried 5 different antiemetics at home without any improvement in her symptoms. She follows with [facility] for her cancer treatments. She had similar side effects after her first round of chemo     She denies any fevers, chills, chest pain, shortness of breath, abdominal pain, black or bloody stools, hematemesis.      REVIEW OF SYSTEMS   Review of Systems   Constitutional:  Negative for chills and fever.   Respiratory:  Negative for shortness of breath.    Cardiovascular:  Negative for chest pain.   Gastrointestinal:  Positive for diarrhea, nausea and vomiting. Negative for abdominal pain and blood in stool.       All other systems reviewed and are negative unless noted in HPI.      PAST MEDICAL HISTORY:  Past Medical History:   Diagnosis Date    ASCUS on Pap smear 1/23/03    + HPV    Asthma     ASTHMA - MILD INTERMITTENT 8/23/2005    Calculus of gallbladder without mention of cholecystitis or obstruction     CARDIOVASCULAR SCREENING; LDL GOAL LESS THAN 160 10/31/2010    Colon cancer (H)     chemo and radiation therapy started january 9 until february 11, 2017    Depressive disorder 2/22/2011    History of colposcopy with cervical biopsy 2/20/03, 7/23/03    SIMONE I & III, SMIONE I    HUMAN PAPILLOMAVIRUS NOS 3/24/2003    61-low risk    HYPOTHYROIDISM NOS 9/10/2002    Mild major depression (H24) 2/22/2011    Other postablative hypothyroidism     iatrogenically induced hypothyroidism after I-31 for hyperthyroidism   abstracted [id number]    Rectal cancer (H) 4/5/2017    Renal stones     HX of       PAST SURGICAL HISTORY:  Past Surgical History:   Procedure Laterality Date    ABDOMEN SURGERY  4/5/17    Colon resection    C IUD,MIRENA  4/2016    CHOLECYSTECTOMY      COLONOSCOPY Left 12/9/2016    Procedure: COMBINED COLONOSCOPY, SINGLE OR MULTIPLE BIOPSY/POLYPECTOMY BY BIOPSY;  Surgeon: Claudette De La Paz MD;  Location:  GI     COLONOSCOPY N/A 8/14/2017    Procedure: COLONOSCOPY;  COLONOSCOPY ;  Surgeon: Yair Adkins MD;  Location:  GI    COLONOSCOPY N/A 6/1/2020    Procedure: COLONOSCOPY, WITH POLYPECTOMY AND BIOPSY;  Surgeon: Yair Adkins MD;  Location:  GI    DAVINCI COLECTOMY N/A 4/5/2017    Procedure: DAVINCI XI COLECTOMY;  Surgeon: Yair Adkins MD;  Location:  OR    ENT SURGERY  03/2016    Tonsilectomy    EXCISE MASS UPPER EXTREMITY  12/21/2012    Procedure: EXCISE MASS UPPER EXTREMITY;  Excision Subcutaneous Mass Right Tricep;  Surgeon: Mehul Maldonado MD;  Location:  OR    EYE SURGERY      Lasik    HC CAUTERY OF CERVIX, CRYOCAUTERY  3/23/03    IR LUNG BIOPSY MEDIASTINUM LEFT  10/25/2023    MAMMOPLASTY REDUCTION BILATERAL  7/3/2014    Procedure: MAMMOPLASTY REDUCTION BILATERAL;  Surgeon: Yoanna Mccabe MD;  Location:  SD    SIGMOIDOSCOPY FLEXIBLE N/A 3/21/2017    Procedure: SIGMOIDOSCOPY FLEXIBLE;  Surgeon: Yair Adkins MD;  Location: [facility]    TAKEDOWN ILEOSTOMY N/A 9/6/2017    Procedure: TAKEDOWN ILEOSTOMY;  OPEN ILEOSTOMY REVERSAL ;  Surgeon: Yair Adkins MD;  Location:  OR       CURRENT MEDICATIONS:    acetaminophen (TYLENOL) 500 MG tablet  ADVAIR DISKUS 250-50 MCG/ACT inhaler  albuterol (PROAIR HFA/PROVENTIL HFA/VENTOLIN HFA) 108 (90 Base) MCG/ACT inhaler  ascorbic acid 1000 MG TABS tablet  buPROPion (WELLBUTRIN XL) 300 MG 24 hr tablet  Elderberry 500 MG CAPS  fish oil-omega-3 fatty acids 1000 MG capsule  levothyroxine (SYNTHROID) 137 MCG tablet  LORazepam (ATIVAN) 0.5 MG tablet  OLANZapine (ZYPREXA) 2.5 MG tablet  OLANZapine zydis (ZYPREXA) 5 MG ODT  ondansetron (ZOFRAN ODT) 8 MG ODT tab  prochlorperazine (COMPAZINE) 5 MG tablet  sertraline (ZOLOFT) 50 MG tablet  spironolactone (ALDACTONE) 100 MG tablet  vitamin D3 (CHOLECALCIFEROL) 125 MCG (5000 UT) tablet        ALLERGIES:  Allergies   Allergen Reactions    Amoxicillin Shortness Of Breath    Clavulanic Acid Shortness Of Breath     "Sulfa Antibiotics Shortness Of Breath and Hives    Adhesive Tape Itching and Rash    Cats     Meperidine Nausea and Vomiting    Pollen Extract        FAMILY HISTORY:  Family History   Problem Relation Age of Onset    Cancer Maternal Grandfather         -lung    Cancer Paternal Grandfather             Hypertension Father     Diabetes Father 60        type 2    Lipids Maternal Grandmother     Cancer Paternal Grandmother         pancreatic    Hypertension Mother     Colon Cancer No family hx of        SOCIAL HISTORY:   Social History     Socioeconomic History    Marital status: Single   Tobacco Use    Smoking status: Former     Packs/day: 1.00     Years: 13.00     Additional pack years: 0.00     Total pack years: 13.00     Types: Cigarettes     Quit date: 2006     Years since quittin.0    Smokeless tobacco: Never   Substance and Sexual Activity    Alcohol use: No     Alcohol/week: 0.0 standard drinks of alcohol     Comment: rare    Drug use: No    Sexual activity: Not Currently     Partners: Male     Birth control/protection: I.U.D.   Other Topics Concern    Parent/sibling w/ CABG, MI or angioplasty before 65F 55M? No       VITALS:  Patient Vitals for the past 24 hrs:   BP Temp Temp src Pulse Resp SpO2 Height Weight   23 1509 133/82 -- -- 95 19 100 % -- --   23 1300 135/76 -- -- 83 17 97 % -- --   23 1230 136/76 -- -- 86 12 98 % -- --   23 1200 134/83 -- -- 92 -- 97 % -- --   23 1014 (!) 148/85 97.4  F (36.3  C) Temporal (!) 126 16 99 % 1.651 m (5' 5\") 117 kg (258 lb)       PHYSICAL EXAM    VITAL SIGNS: /82   Pulse 95   Temp 97.4  F (36.3  C) (Temporal)   Resp 19   Ht 1.651 m (5' 5\")   Wt 117 kg (258 lb)   SpO2 100%   BMI 42.93 kg/m    General Appearance: Ill but non-toxic appearing; Alert, cooperative, normal speech and facial symmetry, appears stated age  Head:  Normocephalic, without obvious abnormality, atraumatic  Eyes: Conjunctiva/corneas clear, " EOM's intact, no nystagmus, PERRL  ENT: Dry lips and mucous membranes;  Lips, mucosa, and tongue normal; teeth and gums normal, no pharyngeal inflammation, no dysphonia or difficulty swallowing  Cardio:  Regular rate and rhythm, S1 and S2 normal, no murmur, rub    or gallop, 2+ pulses symmetric in all extremities  Pulm:  Clear to auscultation bilaterally, respirations unlabored with no accessory muscle use  Abdomen:  Abdomen is soft, non-distended with no tenderness to palpation, rebound tenderness, or guarding.   Extremities:  Moves all extremities  Neuro: Patient is awake, alert, and responsive to voice. No gross motor weaknesses or sensory loss; moves all extremities.    LAB:  All pertinent labs reviewed and interpreted.  Labs Ordered and Resulted from Time of ED Arrival to Time of ED Departure   COMPREHENSIVE METABOLIC PANEL - Abnormal       Result Value    Sodium 134 (*)     Potassium 3.8      Carbon Dioxide (CO2) 25      Anion Gap 9      Urea Nitrogen 23.7 (*)     Creatinine 0.88      GFR Estimate 80      Calcium 9.3      Chloride 100      Glucose 117 (*)     Alkaline Phosphatase 95      AST 30      ALT 68 (*)     Protein Total 7.2      Albumin 4.4      Bilirubin Total 0.8     CBC WITH PLATELETS AND DIFFERENTIAL - Abnormal    WBC Count 2.9 (*)     RBC Count 4.37      Hemoglobin 13.8      Hematocrit 40.7      MCV 93      MCH 31.6      MCHC 33.9      RDW 13.2      Platelet Count 287      % Neutrophils 54      % Lymphocytes 34      % Monocytes 2      % Eosinophils 9      % Basophils 0      % Immature Granulocytes 1      NRBCs per 100 WBC 0      Absolute Neutrophils 1.5 (*)     Absolute Lymphocytes 1.0      Absolute Monocytes 0.1      Absolute Eosinophils 0.3      Absolute Basophils 0.0      Absolute Immature Granulocytes 0.0      Absolute NRBCs 0.0     MAGNESIUM - Normal    Magnesium 2.0     LACTIC ACID WHOLE BLOOD - Normal    Lactic Acid 1.5     ROUTINE UA WITH MICROSCOPIC REFLEX TO CULTURE        RADIOLOGY:  Reviewed all pertinent imaging. Please see official radiology report.  No orders to display       EKG:    Performed at: 10:18 AM    I have independently reviewed and interpreted the EKG, along with the final read. EKG also reviewed by Dr. Julio Fleming.    Ventricular rate 131 bpm  KS interval 136 ms  QRS duration 76 ms  QT/QTc 286/422 ms  P-R-T axes 48 -5 30    Impression: Sinus tachycardia        Maite Brown PA-C  Emergency Medicine  [facility] EMERGENCY ROOM  [address] [place] [id number]  [phone]  Dept: [phone]       Maite Brown PA-C  12/31/23 1546

## 2024-01-01 ENCOUNTER — HOSPITAL ENCOUNTER (OUTPATIENT)
Facility: CLINIC | Age: 49
Setting detail: OBSERVATION
Discharge: HOME | End: 2024-01-04
Attending: EMERGENCY MEDICINE | Admitting: EMERGENCY MEDICINE
Payer: COMMERCIAL

## 2024-01-01 ENCOUNTER — APPOINTMENT (OUTPATIENT)
Dept: CT IMAGING | Facility: CLINIC | Age: 49
End: 2024-01-01
Attending: EMERGENCY MEDICINE
Payer: COMMERCIAL

## 2024-01-01 DIAGNOSIS — R11.2 INTRACTABLE NAUSEA AND VOMITING: ICD-10-CM

## 2024-01-01 DIAGNOSIS — A08.11 ACUTE GASTROENTEROPATHY DUE TO NOROVIRUS: Primary | ICD-10-CM

## 2024-01-01 LAB
ALBUMIN SERPL BCG-MCNC: 4.4 G/DL (ref 3.5–5.2)
ALBUMIN UR-MCNC: 30 MG/DL
ALP SERPL-CCNC: 97 U/L (ref 40–150)
ALT SERPL W P-5'-P-CCNC: 136 U/L (ref 0–50)
ANION GAP SERPL CALCULATED.3IONS-SCNC: 11 MMOL/L (ref 7–15)
APPEARANCE UR: CLEAR
AST SERPL W P-5'-P-CCNC: 74 U/L (ref 0–45)
ATRIAL RATE - MUSE: 119 BPM
BASOPHILS # BLD AUTO: 0 10E3/UL (ref 0–0.2)
BASOPHILS NFR BLD AUTO: 0 %
BILIRUB SERPL-MCNC: 0.7 MG/DL
BILIRUB UR QL STRIP: NEGATIVE
BUN SERPL-MCNC: 14.2 MG/DL (ref 6–20)
C DIFF TOX B STL QL: NEGATIVE
CALCIUM SERPL-MCNC: 9.2 MG/DL (ref 8.6–10)
CHLORIDE SERPL-SCNC: 101 MMOL/L (ref 98–107)
COLOR UR AUTO: YELLOW
CREAT SERPL-MCNC: 0.83 MG/DL (ref 0.51–0.95)
DEPRECATED HCO3 PLAS-SCNC: 25 MMOL/L (ref 22–29)
DIASTOLIC BLOOD PRESSURE - MUSE: NORMAL MMHG
EGFRCR SERPLBLD CKD-EPI 2021: 86 ML/MIN/1.73M2
EOSINOPHIL # BLD AUTO: 0.2 10E3/UL (ref 0–0.7)
EOSINOPHIL NFR BLD AUTO: 3 %
ERYTHROCYTE [DISTWIDTH] IN BLOOD BY AUTOMATED COUNT: 13.2 % (ref 10–15)
GLUCOSE SERPL-MCNC: 121 MG/DL (ref 70–99)
GLUCOSE UR STRIP-MCNC: NEGATIVE MG/DL
HCT VFR BLD AUTO: 39.3 % (ref 35–47)
HGB BLD-MCNC: 13.5 G/DL (ref 11.7–15.7)
HGB UR QL STRIP: NEGATIVE
IMM GRANULOCYTES # BLD: 0 10E3/UL
IMM GRANULOCYTES NFR BLD: 0 %
INTERPRETATION ECG - MUSE: NORMAL
KETONES UR STRIP-MCNC: NEGATIVE MG/DL
LEUKOCYTE ESTERASE UR QL STRIP: NEGATIVE
LYMPHOCYTES # BLD AUTO: 1.4 10E3/UL (ref 0.8–5.3)
LYMPHOCYTES NFR BLD AUTO: 27 %
MAGNESIUM SERPL-MCNC: 2.1 MG/DL (ref 1.7–2.3)
MCH RBC QN AUTO: 32.1 PG (ref 26.5–33)
MCHC RBC AUTO-ENTMCNC: 34.4 G/DL (ref 31.5–36.5)
MCV RBC AUTO: 93 FL (ref 78–100)
MONOCYTES # BLD AUTO: 0.1 10E3/UL (ref 0–1.3)
MONOCYTES NFR BLD AUTO: 2 %
MUCOUS THREADS #/AREA URNS LPF: PRESENT /LPF
NEUTROPHILS # BLD AUTO: 3.4 10E3/UL (ref 1.6–8.3)
NEUTROPHILS NFR BLD AUTO: 68 %
NITRATE UR QL: NEGATIVE
NRBC # BLD AUTO: 0 10E3/UL
NRBC BLD AUTO-RTO: 0 /100
P AXIS - MUSE: 36 DEGREES
PH UR STRIP: 6.5 [PH] (ref 5–7)
PLATELET # BLD AUTO: 277 10E3/UL (ref 150–450)
POTASSIUM SERPL-SCNC: 3.6 MMOL/L (ref 3.4–5.3)
PR INTERVAL - MUSE: 140 MS
PROT SERPL-MCNC: 7 G/DL (ref 6.4–8.3)
QRS DURATION - MUSE: 78 MS
QT - MUSE: 314 MS
QTC - MUSE: 441 MS
R AXIS - MUSE: -25 DEGREES
RBC # BLD AUTO: 4.21 10E6/UL (ref 3.8–5.2)
RBC URINE: 1 /HPF
SODIUM SERPL-SCNC: 137 MMOL/L (ref 135–145)
SP GR UR STRIP: 1.03 (ref 1–1.03)
SQUAMOUS EPITHELIAL: 1 /HPF
SYSTOLIC BLOOD PRESSURE - MUSE: NORMAL MMHG
T AXIS - MUSE: 55 DEGREES
UROBILINOGEN UR STRIP-MCNC: 2 MG/DL
VENTRICULAR RATE- MUSE: 119 BPM
WBC # BLD AUTO: 5 10E3/UL (ref 4–11)
WBC URINE: 5 /HPF

## 2024-01-01 PROCEDURE — 93005 ELECTROCARDIOGRAM TRACING: CPT | Performed by: EMERGENCY MEDICINE

## 2024-01-01 PROCEDURE — 250N000011 HC RX IP 250 OP 636: Mod: JZ | Performed by: EMERGENCY MEDICINE

## 2024-01-01 PROCEDURE — 36415 COLL VENOUS BLD VENIPUNCTURE: CPT | Performed by: EMERGENCY MEDICINE

## 2024-01-01 PROCEDURE — 87493 C DIFF AMPLIFIED PROBE: CPT | Performed by: EMERGENCY MEDICINE

## 2024-01-01 PROCEDURE — 85025 COMPLETE CBC W/AUTO DIFF WBC: CPT | Performed by: EMERGENCY MEDICINE

## 2024-01-01 PROCEDURE — 80053 COMPREHEN METABOLIC PANEL: CPT | Performed by: EMERGENCY MEDICINE

## 2024-01-01 PROCEDURE — 81001 URINALYSIS AUTO W/SCOPE: CPT | Performed by: EMERGENCY MEDICINE

## 2024-01-01 PROCEDURE — 87633 RESP VIRUS 12-25 TARGETS: CPT

## 2024-01-01 PROCEDURE — 250N000013 HC RX MED GY IP 250 OP 250 PS 637

## 2024-01-01 PROCEDURE — 82565 ASSAY OF CREATININE: CPT | Mod: 91

## 2024-01-01 PROCEDURE — G0378 HOSPITAL OBSERVATION PER HR: HCPCS

## 2024-01-01 PROCEDURE — 87507 IADNA-DNA/RNA PROBE TQ 12-25: CPT | Performed by: EMERGENCY MEDICINE

## 2024-01-01 PROCEDURE — 84100 ASSAY OF PHOSPHORUS: CPT

## 2024-01-01 PROCEDURE — 96375 TX/PRO/DX INJ NEW DRUG ADDON: CPT

## 2024-01-01 PROCEDURE — 36415 COLL VENOUS BLD VENIPUNCTURE: CPT

## 2024-01-01 PROCEDURE — 74177 CT ABD & PELVIS W/CONTRAST: CPT

## 2024-01-01 PROCEDURE — 99285 EMERGENCY DEPT VISIT HI MDM: CPT | Mod: 25

## 2024-01-01 PROCEDURE — 80074 ACUTE HEPATITIS PANEL: CPT

## 2024-01-01 PROCEDURE — 83735 ASSAY OF MAGNESIUM: CPT | Mod: 91

## 2024-01-01 PROCEDURE — 250N000011 HC RX IP 250 OP 636: Performed by: EMERGENCY MEDICINE

## 2024-01-01 PROCEDURE — 258N000003 HC RX IP 258 OP 636: Performed by: EMERGENCY MEDICINE

## 2024-01-01 PROCEDURE — 83735 ASSAY OF MAGNESIUM: CPT | Performed by: EMERGENCY MEDICINE

## 2024-01-01 PROCEDURE — 96361 HYDRATE IV INFUSION ADD-ON: CPT

## 2024-01-01 PROCEDURE — 96374 THER/PROPH/DIAG INJ IV PUSH: CPT

## 2024-01-01 RX ORDER — MECLIZINE HCL 12.5 MG 12.5 MG/1
12.5 TABLET ORAL ONCE
Status: COMPLETED | OUTPATIENT
Start: 2024-01-01 | End: 2024-01-01

## 2024-01-01 RX ORDER — LEVOTHYROXINE SODIUM 137 UG/1
137 TABLET ORAL EVERY MORNING
Status: DISCONTINUED | OUTPATIENT
Start: 2024-01-02 | End: 2024-01-04 | Stop reason: HOSPADM

## 2024-01-01 RX ORDER — SPIRONOLACTONE 25 MG/1
100 TABLET ORAL DAILY
Status: DISCONTINUED | OUTPATIENT
Start: 2024-01-02 | End: 2024-01-02

## 2024-01-01 RX ORDER — SERTRALINE HYDROCHLORIDE 100 MG/1
50 TABLET, FILM COATED ORAL DAILY
COMMUNITY

## 2024-01-01 RX ORDER — DROPERIDOL 2.5 MG/ML
0.62 INJECTION, SOLUTION INTRAMUSCULAR; INTRAVENOUS ONCE
Status: COMPLETED | OUTPATIENT
Start: 2024-01-02 | End: 2024-01-02

## 2024-01-01 RX ORDER — FLUTICASONE FUROATE AND VILANTEROL 100; 25 UG/1; UG/1
1 POWDER RESPIRATORY (INHALATION) DAILY
Status: DISCONTINUED | OUTPATIENT
Start: 2024-01-02 | End: 2024-01-04 | Stop reason: HOSPADM

## 2024-01-01 RX ORDER — AMOXICILLIN 250 MG
1 CAPSULE ORAL 2 TIMES DAILY PRN
Status: DISCONTINUED | OUTPATIENT
Start: 2024-01-01 | End: 2024-01-04 | Stop reason: HOSPADM

## 2024-01-01 RX ORDER — BUPROPION HYDROCHLORIDE 300 MG/1
300 TABLET ORAL EVERY MORNING
Status: DISCONTINUED | OUTPATIENT
Start: 2024-01-02 | End: 2024-01-04 | Stop reason: HOSPADM

## 2024-01-01 RX ORDER — SODIUM CHLORIDE, SODIUM LACTATE, POTASSIUM CHLORIDE, CALCIUM CHLORIDE 600; 310; 30; 20 MG/100ML; MG/100ML; MG/100ML; MG/100ML
INJECTION, SOLUTION INTRAVENOUS CONTINUOUS
Status: DISCONTINUED | OUTPATIENT
Start: 2024-01-01 | End: 2024-01-01

## 2024-01-01 RX ORDER — ENOXAPARIN SODIUM 100 MG/ML
40 INJECTION SUBCUTANEOUS EVERY 12 HOURS
Status: DISCONTINUED | OUTPATIENT
Start: 2024-01-01 | End: 2024-01-04 | Stop reason: HOSPADM

## 2024-01-01 RX ORDER — ALBUTEROL SULFATE 90 UG/1
1-2 AEROSOL, METERED RESPIRATORY (INHALATION) EVERY 4 HOURS PRN
Status: DISCONTINUED | OUTPATIENT
Start: 2024-01-01 | End: 2024-01-04 | Stop reason: HOSPADM

## 2024-01-01 RX ORDER — IOPAMIDOL 755 MG/ML
100 INJECTION, SOLUTION INTRAVASCULAR ONCE
Status: COMPLETED | OUTPATIENT
Start: 2024-01-01 | End: 2024-01-01

## 2024-01-01 RX ORDER — SODIUM CHLORIDE 9 MG/ML
INJECTION, SOLUTION INTRAVENOUS CONTINUOUS
Status: DISCONTINUED | OUTPATIENT
Start: 2024-01-01 | End: 2024-01-03

## 2024-01-01 RX ORDER — CHLORAL HYDRATE 500 MG
1 CAPSULE ORAL AT BEDTIME
COMMUNITY

## 2024-01-01 RX ORDER — AMOXICILLIN 250 MG
2 CAPSULE ORAL 2 TIMES DAILY PRN
Status: DISCONTINUED | OUTPATIENT
Start: 2024-01-01 | End: 2024-01-04 | Stop reason: HOSPADM

## 2024-01-01 RX ORDER — ONDANSETRON 2 MG/ML
8 INJECTION INTRAMUSCULAR; INTRAVENOUS ONCE
Status: COMPLETED | OUTPATIENT
Start: 2024-01-01 | End: 2024-01-01

## 2024-01-01 RX ORDER — DROPERIDOL 2.5 MG/ML
0.62 INJECTION, SOLUTION INTRAMUSCULAR; INTRAVENOUS ONCE
Status: COMPLETED | OUTPATIENT
Start: 2024-01-01 | End: 2024-01-01

## 2024-01-01 RX ADMIN — SODIUM CHLORIDE 1000 ML: 9 INJECTION, SOLUTION INTRAVENOUS at 16:45

## 2024-01-01 RX ADMIN — IOPAMIDOL 100 ML: 755 INJECTION, SOLUTION INTRAVENOUS at 19:34

## 2024-01-01 RX ADMIN — ONDANSETRON 8 MG: 2 INJECTION INTRAMUSCULAR; INTRAVENOUS at 17:03

## 2024-01-01 RX ADMIN — SODIUM CHLORIDE: 9 INJECTION, SOLUTION INTRAVENOUS at 21:45

## 2024-01-01 RX ADMIN — DROPERIDOL 0.62 MG: 2.5 INJECTION, SOLUTION INTRAMUSCULAR; INTRAVENOUS at 18:31

## 2024-01-01 RX ADMIN — MECLIZINE HYDROCHLORIDE 12.5 MG: 12.5 TABLET ORAL at 23:38

## 2024-01-01 ASSESSMENT — ACTIVITIES OF DAILY LIVING (ADL)
ADLS_ACUITY_SCORE: 37
ADLS_ACUITY_SCORE: 35

## 2024-01-01 NOTE — PROGRESS NOTES
Pharmacist Admission Medication History    Admission medication history is complete. The information provided in this note is only as accurate as the sources available at the time of the update.    Information Source(s): Patient and CareEverywhere/SureScripts via in-person    Pertinent Information: none    Changes made to PTA medication list:  Added: psyllium husk  Deleted: olanzapine 2.5 mg   Changed: vitamin C 2 tabs at bedtime, sertraline 100 mg x 1/2 = 50 mg dose, prochlorperazine q6h prn, olanzapine 5 mg daily x 4 days 1/1-1/4    Allergies reviewed with patient and updates made in EHR: yes    Medication History Completed By: Aurelia Campo [facility] 1/1/2024 5:43 PM    PTA Med List   Medication Sig Note Last Dose    acetaminophen (TYLENOL) 500 MG tablet Take 1,000 mg by mouth every 6 hours as needed for mild pain  Past Week    ADVAIR DISKUS 250-50 MCG/ACT inhaler Inhale 1 puff into the lungs 2 times daily  1/1/2024 at am - not with    albuterol (PROAIR HFA/PROVENTIL HFA/VENTOLIN HFA) 108 (90 Base) MCG/ACT inhaler Inhale 1-2 puffs into the lungs every 4 hours as needed for shortness of breath  Unknown at has with    ascorbic acid 1000 MG TABS tablet Take 2,000 mg by mouth at bedtime  12/31/2023 at pm    buPROPion (WELLBUTRIN XL) 300 MG 24 hr tablet Take 300 mg by mouth every morning  1/1/2024 at am    Elderberry 500 MG CAPS Take 1,000 mg by mouth daily  1/1/2024 at am    fish oil-omega-3 fatty acids 1000 MG capsule Take 1 g by mouth at bedtime 1/1/2024: 2 caps AM + 1 cap PM 12/31/2023 at pm    fish oil-omega-3 fatty acids 1000 MG capsule Take 2 g by mouth daily 1/1/2024: 2 caps AM + 1 cap PM 1/1/2024 at am    levothyroxine (SYNTHROID) 137 MCG tablet Take 1 tablet (137 mcg) by mouth every morning  1/1/2024 at am    LORazepam (ATIVAN) 0.5 MG tablet Take 0.5 mg by mouth every 4 hours as needed for nausea  Unknown    OLANZapine zydis (ZYPREXA) 5 MG ODT Take 1 tablet (5 mg) by mouth daily for 4 days 1/1/2024: For 4 days  1/1/24-1/4/24. Filled #8 tabs, has another 4 tabs for another cycle. 1/1/2024 at am    ondansetron (ZOFRAN ODT) 8 MG ODT tab Take 8 mg by mouth every 8 hours as needed for nausea  Unknown    psyllium (METAMUCIL/KONSYL) capsule Take 1 capsule by mouth daily  1/1/2024 at am    sertraline (ZOLOFT) 100 MG tablet Take 50 mg by mouth daily 1/1/2024: Didn't tolerate 100 mg dose, reduced back to 50 mg, taking 1/2 x 100 mg = 50 mg dose. 1/1/2024 at am    spironolactone (ALDACTONE) 100 MG tablet Take 100 mg by mouth daily  1/1/2024 at am    vitamin D3 (CHOLECALCIFEROL) 125 MCG (5000 UT) tablet Take 5,000 Units by mouth daily  1/1/2024 at m

## 2024-01-01 NOTE — LETTER
[address] [address]  Phone: [phone]  Fax: [phone]    January 4, 2024        Yahaira Ramirez  [address] [facility] [address]          To whom it may concern:    RE: Yahaira Ramirez remained hospitalized from January 1, to January 4, 2024, and missed work. She may return to work on Monday, January 8, 2024, without working or lifting restrictions.     Thank you for your attention to this matter.     Respectfully yours,        Paul Bonilla MD DEEPTI  Resident Physician, PGY-1  [facility]  Tel. [phone]

## 2024-01-01 NOTE — ED TRIAGE NOTES
Pt hx colorectal cancer and mets to lung, had left upper nodule of lung removed, is on chemo 2nd round, last Tuesday was last chemo. Pt states can't keep anything down, vomiting, severe diarrhea, can't eat or drink, was here last night for same. Pt states sent home with nausea medicine and works for a but and come back. Pt c/o abdominal bloating anytime eats or drinks anything and then vomits. Denies fevers. Denies blood in vomit. Denies bloody or black stools.

## 2024-01-01 NOTE — ED PROVIDER NOTES
EMERGENCY DEPARTMENT ENCOUNTER      NAME: Yahaira Ramirez  AGE: 48 year old female  YOB: 1975  MRN: [id number]  EVALUATION DATE & TIME: 1/1/2024  4:07 PM    PCP: Zheng Way    ED PROVIDER: Anahi Levy MD      Chief Complaint   Patient presents with    Vomiting         FINAL IMPRESSION:  1. Intractable nausea and vomiting          ED COURSE & MEDICAL DECISION MAKING:    Pertinent Labs & Imaging studies reviewed. (See chart for details)    4:39 PM I introduced myself to the patient, obtained patient history, performed a physical exam, and discussed plan for ED workup including potential diagnostic laboratory/imaging studies and interventions.  8:33 PM I spoke with Dr. Covington, resident for hospitalist services.     48 year old female with a pertinent history of rectal cancer with metastases to the lungs, diverticular disease of large intestine, polyp of colon, obesity, and MDD who presents to this ED for evaluation of emesis.  Reviewed the patient's visit yesterday.  She attempted to go home where she was feeling better but now returns here she is feeling worse and has been unable to keep anything down at this point.  Still having significant vomiting and diarrhea.  At this point as she is reporting abdominal tension and discomfort will obtain CT imaging of the abdomen pelvis with contrast.  Repeat labs obtained.  She was given 8 mg of IV Zofran.  She did not have significant improvement with this.  Did obtain EKG to check her QTc which is normal and thus we did give her 0.625 mg of IV droperidol.  Was feeling improved with this.  Also given a liter of IV fluids.  CBC reveals white blood cell count of 5 with a hemoglobin of 13.5.  Urinalysis unremarkable.  Enteric pathogen and C. difficile PCR pending.    CMP reveals a sodium of 137 with potassium of 3.6 creatinine 0.83.  AST 74  with a alk phos of 97 and bilirubin that is normal.  Magnesium within normal limits.  She  initially had a slightly low soft blood pressure that improved with fluids.  Was also mildly tachycardic that improved.  She is afebrile here.  CT reveals air-fluid levels in the bowel to include the colon which can be seen with diarrhea.  No evidence for bowel inflammation or obstruction.  No obvious changes of metastatic findings given patient's history of neoplasm.  Postoperative changes of the bowel with no complications identified.  Mild fatty infiltration of the liver.  As she returned and has worsened we will admit her for further hydration and symptom control.  Spoke with the hospitalist accepted the patient for admission.  Patient was in agreement with this plan.  She was admitted in stable condition.         At the conclusion of the encounter I discussed the results of all of the tests and the disposition. The questions were answered. The patient or family acknowledged understanding and was agreeable with the care plan.         Medical Decision Making    History:  Supplemental history from: Documented in chart, if applicable  External Record(s) reviewed: Other: 12/31/2023 [facility] emergency room    Work Up:  Chart documentation includes differential considered and any EKGs or imaging independently interpreted by provider.  In additional to work up documented, I considered the following work up: Documented in chart, if applicable.    External consultation:  Discussion of management with another provider: Documented in chart, if applicable and Hospitalist    Complicating factors:  Care impacted by chronic illness: Cancer/Chemotherapy  Care affected by social determinants of health: N/A    Disposition considerations: Admit.      MEDICATIONS GIVEN IN THE EMERGENCY:  Medications   sodium chloride 0.9 % infusion (has no administration in time range)   sodium chloride 0.9% BOLUS 1,000 mL (1,000 mLs Intravenous $New Bag 1/1/24 1424)   ondansetron (ZOFRAN) injection 8 mg (8 mg Intravenous $Given  1/1/24 1703)   droPERidol (INAPSINE) injection 0.625 mg (0.625 mg Intravenous $Given 1/1/24 1831)   iopamidol (ISOVUE-370) solution 100 mL (100 mLs Intravenous $Given 1/1/24 1934)       NEW PRESCRIPTIONS STARTED AT TODAY'S ER VISIT  New Prescriptions    No medications on file          =================================================================    HPI    Patient information was obtained from: Patient    Use of : N/A        Yahaira Ramirez is a 48 year old female with a pertinent history of rectal cancer with metastases to the lungs, diverticular disease of large intestine, polyp of colon, obesity, and MDD who presents to this ED for evaluation of emesis.    Per chart review: Patient was seen on 12/31/2023 at [facility] emergency room for evaluation of vomiting and diarrhea.  EKG normal.  Given 2 L of IV fluids, IV Zofran, and IV Zyprexa with improvement in symptoms.  Further labs and imaging were not necessary.  [place] [facility] was consulted who recommended continuing her home dose of Zyprexa.  Recommended follow-up with them.  Given return precautions and discharged home.    Patient reports that after being here in the emergency department yesterday she was feeling good until this morning.  Family reports that she stayed home and was attempting to eat some toast, however, patient reports that she cannot eat or drink anything due to continuing symptoms of abdominal distention, nausea, vomiting and diarrhea.  Patient states that today the symptoms have been progressively worsening, even with attempting to treat her symptoms including Imodium.    She endorses having colorectal cancer that has now metastasized to the lungs.  She states that she did have a left upper lobe dissection and is now on chemotherapy.  Her last session of Chemotherapy was last Tuesday (~6 days ago).     For her symptoms, patient endorses taking Zyprexa.  She states that last night she took  lorazepam.    Denies any blood in her vomit or stool or fever.  Denies any recent antibiotic use. No chest pain or shortness of breath. No melena.         REVIEW OF SYSTEMS   Review of Systems   Pertinent positives and negatives are documented in the HPI. All other systems reviewed and are negative.      PAST MEDICAL HISTORY:  Past Medical History:   Diagnosis Date    ASCUS on Pap smear 1/23/03    + HPV    Asthma     ASTHMA - MILD INTERMITTENT 8/23/2005    Calculus of gallbladder without mention of cholecystitis or obstruction     CARDIOVASCULAR SCREENING; LDL GOAL LESS THAN 160 10/31/2010    Colon cancer (H)     chemo and radiation therapy started january 9 until february 11, 2017    Depressive disorder 2/22/2011    History of colposcopy with cervical biopsy 2/20/03, 7/23/03    SIMONE I & III, SIMONE I    HUMAN PAPILLOMAVIRUS NOS 3/24/2003    61-low risk    HYPOTHYROIDISM NOS 9/10/2002    Intractable nausea and vomiting 1/1/2024    Mild major depression (H24) 2/22/2011    Other postablative hypothyroidism     iatrogenically induced hypothyroidism after I-31 for hyperthyroidism   abstracted [id number]    Rectal cancer (H) 4/5/2017    Renal stones     HX of       PAST SURGICAL HISTORY:  Past Surgical History:   Procedure Laterality Date    ABDOMEN SURGERY  4/5/17    Colon resection    C IUD,MIRENA  4/2016    CHOLECYSTECTOMY      COLONOSCOPY Left 12/9/2016    Procedure: COMBINED COLONOSCOPY, SINGLE OR MULTIPLE BIOPSY/POLYPECTOMY BY BIOPSY;  Surgeon: Claudette De La Paz MD;  Location:  GI    COLONOSCOPY N/A 8/14/2017    Procedure: COLONOSCOPY;  COLONOSCOPY ;  Surgeon: Yair Adkins MD;  Location: [facility]    COLONOSCOPY N/A 6/1/2020    Procedure: COLONOSCOPY, WITH POLYPECTOMY AND BIOPSY;  Surgeon: Yair Adkins MD;  Location: [facility]    DAVINCI COLECTOMY N/A 4/5/2017    Procedure: DAVINCI XI COLECTOMY;  Surgeon: Yair Adkins MD;  Location:  OR    ENT SURGERY  03/2016    Tonsilectomy    EXCISE MASS UPPER EXTREMITY   2012    Procedure: EXCISE MASS UPPER EXTREMITY;  Excision Subcutaneous Mass Right Tricep;  Surgeon: Mehul Maldonado MD;  Location:  OR    EYE SURGERY      Lasik    HC CAUTERY OF CERVIX, CRYOCAUTERY  3/23/03    IR LUNG BIOPSY MEDIASTINUM LEFT  10/25/2023    MAMMOPLASTY REDUCTION BILATERAL  7/3/2014    Procedure: MAMMOPLASTY REDUCTION BILATERAL;  Surgeon: Yoanna Mccabe MD;  Location:  SD    SIGMOIDOSCOPY FLEXIBLE N/A 3/21/2017    Procedure: SIGMOIDOSCOPY FLEXIBLE;  Surgeon: Yair Adkins MD;  Location:  GI    TAKEDOWN ILEOSTOMY N/A 2017    Procedure: TAKEDOWN ILEOSTOMY;  OPEN ILEOSTOMY REVERSAL ;  Surgeon: Yair Adkins MD;  Location:  OR           CURRENT MEDICATIONS:    acetaminophen (TYLENOL) 500 MG tablet  ADVAIR DISKUS 250-50 MCG/ACT inhaler  albuterol (PROAIR HFA/PROVENTIL HFA/VENTOLIN HFA) 108 (90 Base) MCG/ACT inhaler  ascorbic acid 1000 MG TABS tablet  buPROPion (WELLBUTRIN XL) 300 MG 24 hr tablet  Elderberry 500 MG CAPS  fish oil-omega-3 fatty acids 1000 MG capsule  fish oil-omega-3 fatty acids 1000 MG capsule  levothyroxine (SYNTHROID) 137 MCG tablet  LORazepam (ATIVAN) 0.5 MG tablet  OLANZapine zydis (ZYPREXA) 5 MG ODT  ondansetron (ZOFRAN ODT) 8 MG ODT tab  psyllium (METAMUCIL/KONSYL) capsule  sertraline (ZOLOFT) 100 MG tablet  spironolactone (ALDACTONE) 100 MG tablet  vitamin D3 (CHOLECALCIFEROL) 125 MCG (5000 UT) tablet  prochlorperazine (COMPAZINE) 5 MG tablet        ALLERGIES:  Allergies   Allergen Reactions    Amoxicillin Shortness Of Breath    Clavulanic Acid Shortness Of Breath    Sulfa Antibiotics Shortness Of Breath and Hives    Adhesive Tape Itching and Rash    Cats     Meperidine Nausea and Vomiting    Pollen Extract        FAMILY HISTORY:  Family History   Problem Relation Age of Onset    Cancer Maternal Grandfather         -lung    Cancer Paternal Grandfather             Hypertension Father     Diabetes Father 60        type 2    Lipids  Maternal Grandmother     Cancer Paternal Grandmother         pancreatic    Hypertension Mother     Colon Cancer No family hx of        SOCIAL HISTORY:   Social History     Socioeconomic History    Marital status: Single   Tobacco Use    Smoking status: Former     Packs/day: 1.00     Years: 13.00     Additional pack years: 0.00     Total pack years: 13.00     Types: Cigarettes     Quit date: 2006     Years since quittin.0    Smokeless tobacco: Never   Substance and Sexual Activity    Alcohol use: No     Alcohol/week: 0.0 standard drinks of alcohol     Comment: rare    Drug use: No    Sexual activity: Not Currently     Partners: Male     Birth control/protection: I.U.D.   Other Topics Concern    Parent/sibling w/ CABG, MI or angioplasty before 65F 55M? No       VITALS:  /78   Pulse 92   Temp 98.2  F (36.8  C) (Temporal)   Resp 18   Wt 117 kg (258 lb)   SpO2 100%   BMI 42.93 kg/m      PHYSICAL EXAM    Physical Exam  Constitutional: appears nauseated, dry heaving  HENT: Normocephalic, Atraumatic, Bilateral external ears normal, Oropharynx normal, mucous membranes dry, Nose normal. Neck-  Normal range of motion, No tenderness, Supple, No stridor.    Eyes: PERRL, EOMI, Conjunctiva normal, No discharge.   Respiratory: Normal breath sounds, No respiratory distress, No wheezing or crackles, Speaks in full sentences easily.    Cardiovascular: Normal heart rate, Regular rhythm,  No murmurs, No rubs, No gallops. 2+ radial pulses bilaterally  GI: Bowel sounds normal, Soft, No tenderness, No masses, No rebound or guarding. No CVA tenderness bilaterally   Musculoskeletal: 2+ DP pulses. No notable lower extremity edema. No cyanosis, No clubbing. Good range of motion in all major joints.  Integument: Warm, Dry, No erythema, No rash. No petechiae.   Neurologic: Alert & oriented x 3, 5/5 strength in all 4 extremities bilaterally. Sensation intact to light touch in all 4 extremities and the face bilaterally. No  focal deficits noted.  Psychiatric: Affect normal, Judgment normal, Mood normal. Cooperative.      LAB:  All pertinent labs reviewed and interpreted.  Results for orders placed or performed during the hospital encounter of 01/01/24   CT Abdomen Pelvis w Contrast    Impression    IMPRESSION:   1.  Air-fluid levels in the bowel to include the colon which can be seen with diarrhea. No evidence for bowel inflammation or obstruction.    2.  No obvious changes of metastatic findings given patient's history of neoplasm.    3.  Postoperative changes of the bowel with no complications identified.    4.  Mild fatty infiltration of the liver.    5.  IUD, this is in good position.   Comprehensive metabolic panel   Result Value Ref Range    Sodium 137 135 - 145 mmol/L    Potassium 3.6 3.4 - 5.3 mmol/L    Carbon Dioxide (CO2) 25 22 - 29 mmol/L    Anion Gap 11 7 - 15 mmol/L    Urea Nitrogen 14.2 6.0 - 20.0 mg/dL    Creatinine 0.83 0.51 - 0.95 mg/dL    GFR Estimate 86 >60 mL/min/1.73m2    Calcium 9.2 8.6 - 10.0 mg/dL    Chloride 101 98 - 107 mmol/L    Glucose 121 (H) 70 - 99 mg/dL    Alkaline Phosphatase 97 40 - 150 U/L    AST 74 (H) 0 - 45 U/L     (H) 0 - 50 U/L    Protein Total 7.0 6.4 - 8.3 g/dL    Albumin 4.4 3.5 - 5.2 g/dL    Bilirubin Total 0.7 <=1.2 mg/dL   Result Value Ref Range    Magnesium 2.1 1.7 - 2.3 mg/dL   UA with Microscopic reflex to Culture    Specimen: Urine, Midstream   Result Value Ref Range    Color Urine Yellow Colorless, Straw, Light Yellow, Yellow    Appearance Urine Clear Clear    Glucose Urine Negative Negative mg/dL    Bilirubin Urine Negative Negative    Ketones Urine Negative Negative mg/dL    Specific Gravity Urine 1.028 1.001 - 1.030    Blood Urine Negative Negative    pH Urine 6.5 5.0 - 7.0    Protein Albumin Urine 30 (A) Negative mg/dL    Urobilinogen Urine 2.0 (A) <2.0 mg/dL    Nitrite Urine Negative Negative    Leukocyte Esterase Urine Negative Negative    Mucus Urine Present (A) None Seen  /LPF    RBC Urine 1 <=2 /HPF    WBC Urine 5 <=5 /HPF    Squamous Epithelials Urine 1 <=1 /HPF   CBC with platelets and differential   Result Value Ref Range    WBC Count 5.0 4.0 - 11.0 10e3/uL    RBC Count 4.21 3.80 - 5.20 10e6/uL    Hemoglobin 13.5 11.7 - 15.7 g/dL    Hematocrit 39.3 35.0 - 47.0 %    MCV 93 78 - 100 fL    MCH 32.1 26.5 - 33.0 pg    MCHC 34.4 31.5 - 36.5 g/dL    RDW 13.2 10.0 - 15.0 %    Platelet Count 277 150 - 450 10e3/uL    % Neutrophils 68 %    % Lymphocytes 27 %    % Monocytes 2 %    % Eosinophils 3 %    % Basophils 0 %    % Immature Granulocytes 0 %    NRBCs per 100 WBC 0 <1 /100    Absolute Neutrophils 3.4 1.6 - 8.3 10e3/uL    Absolute Lymphocytes 1.4 0.8 - 5.3 10e3/uL    Absolute Monocytes 0.1 0.0 - 1.3 10e3/uL    Absolute Eosinophils 0.2 0.0 - 0.7 10e3/uL    Absolute Basophils 0.0 0.0 - 0.2 10e3/uL    Absolute Immature Granulocytes 0.0 <=0.4 10e3/uL    Absolute NRBCs 0.0 10e3/uL   ECG 12-LEAD WITH MUSE (LHE)   Result Value Ref Range    Systolic Blood Pressure  mmHg    Diastolic Blood Pressure  mmHg    Ventricular Rate 119 BPM    Atrial Rate 119 BPM    GA Interval 140 ms    QRS Duration 78 ms     ms    QTc 441 ms    P Axis 36 degrees    R AXIS -25 degrees    T Axis 55 degrees    Interpretation ECG       Sinus tachycardia  Possible Anterior infarct (cited on or before 31-DEC-2023)  Abnormal ECG  When compared with ECG of 31-DEC-2023 10:18,  No significant change was found  Confirmed by SEE ED PROVIDER NOTE FOR, ECG INTERPRETATION ([id number]),  Yaneth Lantigua ([id number]) on 1/1/2024 5:23:08 PM         RADIOLOGY:  Reviewed all pertinent imaging. Please see official radiology report.  CT Abdomen Pelvis w Contrast   Final Result   IMPRESSION:    1.  Air-fluid levels in the bowel to include the colon which can be seen with diarrhea. No evidence for bowel inflammation or obstruction.      2.  No obvious changes of metastatic findings given patient's history of neoplasm.      3.  Postoperative  changes of the bowel with no complications identified.      4.  Mild fatty infiltration of the liver.      5.  IUD, this is in good position.          EKG:    Performed at: 1607    Impression: Sinus tachycardia. No sign of acute ischemia.       Rate: 119 BPM  Rhythm: Sinus  Axis: -25  AR Interval: 140  QRS Interval: 78  QTc Interval: 441  ST Changes: N/A  Comparison: No significant change was found when compared to 31-December-2023    I have independently reviewed and interpreted the EKG(s) documented above.    [employer] System Documentation:   CMS Diagnoses:               I, Estefany Griffin, am serving as a scribe to document services personally performed by Anahi Levy MD based on my observation and the provider's statements to me. I, Anahi Levy MD, attest that Estefany Griffin is acting in a scribe capacity, has observed my performance of the services and has documented them in accordance with my direction.    Anahi Levy MD  [facility] EMERGENCY ROOM  [address] [facility] [id number] [phone]       Anahi Levy MD  01/12/24 1389

## 2024-01-02 PROBLEM — K63.5 POLYP OF COLON: Status: ACTIVE | Noted: 2023-06-26

## 2024-01-02 PROBLEM — F33.9 DEPRESSION, RECURRENT (H): Status: ACTIVE | Noted: 2019-04-18

## 2024-01-02 PROBLEM — K57.30 DIVERTICULAR DISEASE OF LARGE INTESTINE: Status: ACTIVE | Noted: 2023-06-26

## 2024-01-02 PROBLEM — K64.9 HEMORRHOIDS: Status: ACTIVE | Noted: 2023-06-26

## 2024-01-02 LAB
ADV 40+41 DNA STL QL NAA+NON-PROBE: NEGATIVE
ALBUMIN SERPL BCG-MCNC: 3.7 G/DL (ref 3.5–5.2)
ALP SERPL-CCNC: 91 U/L (ref 40–150)
ALT SERPL W P-5'-P-CCNC: 136 U/L (ref 0–50)
ANION GAP SERPL CALCULATED.3IONS-SCNC: 8 MMOL/L (ref 7–15)
AST SERPL W P-5'-P-CCNC: 70 U/L (ref 0–45)
ASTRO TYP 1-8 RNA STL QL NAA+NON-PROBE: NEGATIVE
BILIRUB SERPL-MCNC: 0.6 MG/DL
BUN SERPL-MCNC: 12.1 MG/DL (ref 6–20)
C CAYETANENSIS DNA STL QL NAA+NON-PROBE: NEGATIVE
C PNEUM DNA SPEC QL NAA+PROBE: NOT DETECTED
CALCIUM SERPL-MCNC: 8.4 MG/DL (ref 8.6–10)
CAMPYLOBACTER DNA SPEC NAA+PROBE: NEGATIVE
CHLORIDE SERPL-SCNC: 104 MMOL/L (ref 98–107)
CREAT SERPL-MCNC: 0.76 MG/DL (ref 0.51–0.95)
CREAT SERPL-MCNC: 0.9 MG/DL (ref 0.51–0.95)
CRYPTOSP DNA STL QL NAA+NON-PROBE: NEGATIVE
DEPRECATED HCO3 PLAS-SCNC: 24 MMOL/L (ref 22–29)
E COLI O157 DNA STL QL NAA+NON-PROBE: ABNORMAL
E HISTOLYT DNA STL QL NAA+NON-PROBE: NEGATIVE
EAEC ASTA GENE ISLT QL NAA+PROBE: NEGATIVE
EC STX1+STX2 GENES STL QL NAA+NON-PROBE: NEGATIVE
EGFRCR SERPLBLD CKD-EPI 2021: 78 ML/MIN/1.73M2
EGFRCR SERPLBLD CKD-EPI 2021: >90 ML/MIN/1.73M2
EPEC EAE GENE STL QL NAA+NON-PROBE: NEGATIVE
ETEC LTA+ST1A+ST1B TOX ST NAA+NON-PROBE: NEGATIVE
FLUAV H1 2009 PAND RNA SPEC QL NAA+PROBE: NOT DETECTED
FLUAV H1 RNA SPEC QL NAA+PROBE: NOT DETECTED
FLUAV H3 RNA SPEC QL NAA+PROBE: NOT DETECTED
FLUAV RNA SPEC QL NAA+PROBE: NOT DETECTED
FLUBV RNA SPEC QL NAA+PROBE: NOT DETECTED
G LAMBLIA DNA STL QL NAA+NON-PROBE: NEGATIVE
GLUCOSE SERPL-MCNC: 99 MG/DL (ref 70–99)
HADV DNA SPEC QL NAA+PROBE: NOT DETECTED
HAV IGM SERPL QL IA: NONREACTIVE
HBV CORE IGM SERPL QL IA: NONREACTIVE
HBV SURFACE AG SERPL QL IA: NONREACTIVE
HCOV PNL SPEC NAA+PROBE: NOT DETECTED
HCV AB SERPL QL IA: NONREACTIVE
HMPV RNA SPEC QL NAA+PROBE: NOT DETECTED
HOLD SPECIMEN: NORMAL
HPIV1 RNA SPEC QL NAA+PROBE: NOT DETECTED
HPIV2 RNA SPEC QL NAA+PROBE: NOT DETECTED
HPIV3 RNA SPEC QL NAA+PROBE: NOT DETECTED
HPIV4 RNA SPEC QL NAA+PROBE: NOT DETECTED
M PNEUMO DNA SPEC QL NAA+PROBE: NOT DETECTED
MAGNESIUM SERPL-MCNC: 2 MG/DL (ref 1.7–2.3)
NOROVIRUS GI+II RNA STL QL NAA+NON-PROBE: POSITIVE
P SHIGELLOIDES DNA STL QL NAA+NON-PROBE: NEGATIVE
PHOSPHATE SERPL-MCNC: 2.1 MG/DL (ref 2.5–4.5)
PHOSPHATE SERPL-MCNC: 2.4 MG/DL (ref 2.5–4.5)
POTASSIUM SERPL-SCNC: 3.2 MMOL/L (ref 3.4–5.3)
POTASSIUM SERPL-SCNC: 3.4 MMOL/L (ref 3.4–5.3)
POTASSIUM SERPL-SCNC: 4 MMOL/L (ref 3.4–5.3)
PROT SERPL-MCNC: 6 G/DL (ref 6.4–8.3)
RSV RNA SPEC QL NAA+PROBE: NOT DETECTED
RSV RNA SPEC QL NAA+PROBE: NOT DETECTED
RV+EV RNA SPEC QL NAA+PROBE: NOT DETECTED
RVA RNA STL QL NAA+NON-PROBE: NEGATIVE
SALMONELLA SP RPOD STL QL NAA+PROBE: NEGATIVE
SAPO I+II+IV+V RNA STL QL NAA+NON-PROBE: NEGATIVE
SHIGELLA SP+EIEC IPAH ST NAA+NON-PROBE: NEGATIVE
SODIUM SERPL-SCNC: 136 MMOL/L (ref 135–145)
V CHOLERAE DNA SPEC QL NAA+PROBE: NEGATIVE
VIBRIO DNA SPEC NAA+PROBE: NEGATIVE
Y ENTEROCOL DNA STL QL NAA+PROBE: NEGATIVE

## 2024-01-02 PROCEDURE — 250N000011 HC RX IP 250 OP 636: Mod: JZ

## 2024-01-02 PROCEDURE — 258N000003 HC RX IP 258 OP 636: Performed by: EMERGENCY MEDICINE

## 2024-01-02 PROCEDURE — 250N000013 HC RX MED GY IP 250 OP 250 PS 637

## 2024-01-02 PROCEDURE — 250N000013 HC RX MED GY IP 250 OP 250 PS 637: Performed by: STUDENT IN AN ORGANIZED HEALTH CARE EDUCATION/TRAINING PROGRAM

## 2024-01-02 PROCEDURE — 96361 HYDRATE IV INFUSION ADD-ON: CPT

## 2024-01-02 PROCEDURE — 84100 ASSAY OF PHOSPHORUS: CPT | Performed by: STUDENT IN AN ORGANIZED HEALTH CARE EDUCATION/TRAINING PROGRAM

## 2024-01-02 PROCEDURE — G0378 HOSPITAL OBSERVATION PER HR: HCPCS

## 2024-01-02 PROCEDURE — 36415 COLL VENOUS BLD VENIPUNCTURE: CPT | Performed by: STUDENT IN AN ORGANIZED HEALTH CARE EDUCATION/TRAINING PROGRAM

## 2024-01-02 PROCEDURE — 96372 THER/PROPH/DIAG INJ SC/IM: CPT

## 2024-01-02 PROCEDURE — 36415 COLL VENOUS BLD VENIPUNCTURE: CPT

## 2024-01-02 PROCEDURE — 84132 ASSAY OF SERUM POTASSIUM: CPT | Performed by: STUDENT IN AN ORGANIZED HEALTH CARE EDUCATION/TRAINING PROGRAM

## 2024-01-02 PROCEDURE — 99222 1ST HOSP IP/OBS MODERATE 55: CPT | Mod: AI

## 2024-01-02 PROCEDURE — 80053 COMPREHEN METABOLIC PANEL: CPT

## 2024-01-02 RX ORDER — POTASSIUM CHLORIDE 1500 MG/1
40 TABLET, EXTENDED RELEASE ORAL ONCE
Status: COMPLETED | OUTPATIENT
Start: 2024-01-02 | End: 2024-01-02

## 2024-01-02 RX ORDER — ZINC OXIDE 20 %
OINTMENT (GRAM) TOPICAL
Status: DISCONTINUED | OUTPATIENT
Start: 2024-01-02 | End: 2024-01-04 | Stop reason: HOSPADM

## 2024-01-02 RX ORDER — SPIRONOLACTONE 25 MG/1
100 TABLET ORAL DAILY
Status: DISCONTINUED | OUTPATIENT
Start: 2024-01-02 | End: 2024-01-04 | Stop reason: HOSPADM

## 2024-01-02 RX ORDER — MECLIZINE HCL 12.5 MG 12.5 MG/1
12.5-25 TABLET ORAL EVERY 6 HOURS PRN
Status: DISCONTINUED | OUTPATIENT
Start: 2024-01-02 | End: 2024-01-04 | Stop reason: HOSPADM

## 2024-01-02 RX ADMIN — LEVOTHYROXINE SODIUM 137 MCG: 0.14 TABLET ORAL at 05:19

## 2024-01-02 RX ADMIN — POTASSIUM & SODIUM PHOSPHATES POWDER PACK 280-160-250 MG 1 PACKET: 280-160-250 PACK at 16:51

## 2024-01-02 RX ADMIN — SODIUM CHLORIDE: 9 INJECTION, SOLUTION INTRAVENOUS at 16:44

## 2024-01-02 RX ADMIN — POTASSIUM & SODIUM PHOSPHATES POWDER PACK 280-160-250 MG 1 PACKET: 280-160-250 PACK at 20:07

## 2024-01-02 RX ADMIN — SERTRALINE 50 MG: 50 TABLET, FILM COATED ORAL at 09:25

## 2024-01-02 RX ADMIN — ENOXAPARIN SODIUM 40 MG: 100 INJECTION SUBCUTANEOUS at 11:36

## 2024-01-02 RX ADMIN — Medication 1 CAPSULE: at 20:32

## 2024-01-02 RX ADMIN — MECLIZINE HYDROCHLORIDE 12.5 MG: 12.5 TABLET ORAL at 15:42

## 2024-01-02 RX ADMIN — FLUTICASONE FUROATE AND VILANTEROL TRIFENATATE 1 PUFF: 100; 25 POWDER RESPIRATORY (INHALATION) at 09:23

## 2024-01-02 RX ADMIN — POTASSIUM & SODIUM PHOSPHATES POWDER PACK 280-160-250 MG 1 PACKET: 280-160-250 PACK at 11:34

## 2024-01-02 RX ADMIN — DROPERIDOL 0.62 MG: 2.5 INJECTION, SOLUTION INTRAMUSCULAR; INTRAVENOUS at 09:11

## 2024-01-02 RX ADMIN — POTASSIUM CHLORIDE 40 MEQ: 1500 TABLET, EXTENDED RELEASE ORAL at 09:24

## 2024-01-02 RX ADMIN — BUPROPION HYDROCHLORIDE 300 MG: 300 TABLET, EXTENDED RELEASE ORAL at 09:24

## 2024-01-02 RX ADMIN — SPIRONOLACTONE 100 MG: 25 TABLET, FILM COATED ORAL at 11:35

## 2024-01-02 RX ADMIN — POTASSIUM CHLORIDE 40 MEQ: 1500 TABLET, EXTENDED RELEASE ORAL at 16:51

## 2024-01-02 ASSESSMENT — ACTIVITIES OF DAILY LIVING (ADL)
ADLS_ACUITY_SCORE: 37
ADLS_ACUITY_SCORE: 32
ADLS_ACUITY_SCORE: 37
ADLS_ACUITY_SCORE: 32
ADLS_ACUITY_SCORE: 37
ADLS_ACUITY_SCORE: 32
ADLS_ACUITY_SCORE: 32
ADLS_ACUITY_SCORE: 37

## 2024-01-02 NOTE — PROGRESS NOTES
PRIMARY DIAGNOSIS: GASTROENTERITIS    OUTPATIENT/OBSERVATION GOALS TO BE MET BEFORE DISCHARGE  1. Orthostatic performed: N/A    2. Tolerating PO fluid and/or antibiotics (if applicable): Yes    3. Nausea/Vomiting/Diarrhea symptoms improved: No,  frequent and runny    4. Pain status: Pain free.    5. Return to near baseline physical activity: Yes    Discharge Planner Nurse   Safe discharge environment identified: No  Barriers to discharge: Yes       Entered by: Winter Valle RN 01/02/2024 5:30 PM  Pt just trying some PO intake since arriving to unit. Has not had a stool since arriving to unit. Was able to hook pt up to IV fluids without her having emesis. D/t IV flush taste makes her nauseated.      Please review provider order for any additional goals.   Nurse to notify provider when observation goals have been met and patient is ready for discharge.

## 2024-01-02 NOTE — PROGRESS NOTES
Patient is A&O X4, vitally stable on RA. She has had nausea on primary assessment, coupled with an episode of diarrhea. Ambulated standby to the bathroom.IV NS infusion at 125 ml/hr initiated after 1000 ml bolus. Patient has nausea with each saline flush. Emesis bag by the bedside.

## 2024-01-02 NOTE — PROGRESS NOTES
PRIMARY DIAGNOSIS: INTRACTABLE NAUSEA AND VOMITING  OUTPATIENT/OBSERVATION GOALS TO BE MET BEFORE DISCHARGE:  1. Stable vital signs Yes  2. Tolerating diet: Has intermittent nausea  3. Pain controlled with oral pain medications:   Abdominal Discomfort, Denies pain  4. Positive bowel sounds:  Yes  5. Voiding without difficulty:  Yes  6. Able to ambulate:  Yes  7. Provider specific discharge goals met:  No    Discharge Planner Nurse   Safe discharge environment identified: Yes  Barriers to discharge: Yes       Entered by: Matt Montez RN 01/02/2024 4:54 AM     Please review provider order for any additional goals.   Nurse to notify provider when observation goals have been met and patient is ready for discharge.    Patient is A&O X4, VSS  on RA. Complains of Nausea and diarrhea, has had 4 loose stools since 1900 hrs, stool sample tested positive for Norovirus.Enteric precautions initiated. Denies pain, SOB, chest tightness, dizziness.. TELE reads Sinus Rhythm HR in the 70's.  On a regular diet, unable to keep food down d/t nausea. Zofran was ineffective, initially Droperidol administered with MD order of monitoring QTc interval with some relief. Meclizine administered at 2330 hrs. No c/o nausea since.  PIV patent, normal saline infusing at 125 ml/hr. Has in accessed PORT on right side of her chest.   Calls appropriately, ambulates standby to the bathroom.

## 2024-01-02 NOTE — PROGRESS NOTES
"PRIMARY DIAGNOSIS: \"GENERIC\" NURSING  OUTPATIENT/OBSERVATION GOALS TO BE MET BEFORE DISCHARGE:  ADLs back to baseline: No    Activity and level of assistance: Ambulating independently.    Pain status: Pain free.    Return to near baseline physical activity: No     Discharge Planner Nurse   Safe discharge environment identified: Yes  Barriers to discharge: Yes       Entered by: Matt Montez RN 01/02/2024 1:02 AM     Please review provider order for any additional goals.   Nurse to notify provider when observation goals have been met and patient is ready for discharge.  "

## 2024-01-02 NOTE — H&P
[facility]    History and Physical - Hospitalist Service       Date of Admission:  1/1/2024    Assessment & Plan      Yahaira Ramirez is a 48 year old female admitted on 1/1/2024. She has a history of colorectal cancer s/p chemotherapy with irinotecan and 5-FU with mets to PAUL s/p wedge resection 11/2023, acquired hypothyroidism, MDD, asthma, cutaneous sarcoidosis, and is admitted for intractable N/V and diarrhea 2/2 norovirus.    Abdominal distention, N/V, likely chemotherapy associated  Norovirus gastroenteritis  Elevated LFTs   Colorectal adenocarcinoma w/ mets to PAUL s/p wedge resection  Patient had second round of irinotecan and 5-FU 5 days ago and since has had intractable N/V/D with no improvement with Zofran or Compazine.  Given droperidol in the ED with good relief, QTc in the 440s.  Discussed with pharmacist, will continue droperidol every 6 hours with 2 to 3 hours of telemetry per protocol.  Day team to reassess antiemetic regimen.  - Admit to observation  - Port-a-cath in place, patient prefers meds and fluids through IV  - Continue droperidol every 6 hours given clinical improvement  - Avoid further D2 antagonism due to risk for EPS  - Meclizine given one-time as adjunct for nausea relief  - mIVF NS@125mL/hr  - Regular diet as tolerated  - AM CMP  - Acute Hepatitis Panel/Influenza Swab  - Mg/Phos, will place order for replacement per protocol if low    Chronic conditions  -MDD: Continue PTA bupropion and sertraline  -Asthma: Continue PTA Advair and as needed albuterol  -Hypothyroidism: Continue PTA Synthroid  -LARS: Continue PTA psyllium  -Patient states she has been taking spironolactone for 3 months for hormonal acne. Will hold for now, day team to reevaluate.     Metastatic colorectal adenocarcinoma diagnosed in 2017, follows with [place] [facility].  - s/p chemotherapy, most recently completed second course of irinotecan and 5-FU  - s/p radiation therapy   - s/p  "robotic-assisted low anterior resection with diverting loop ileostomy on 04/2017 s/p takedown in 09/2017   - s/p APUL wedge resection for mets in 11/2023.       Observation Goals: -diagnostic tests and consults completed and resulted, -vital signs normal or at patient baseline, -tolerating oral intake to maintain hydration, -Nausea and vomiting controlled., Nurse to notify provider when observation goals have been met and patient is ready for discharge.  Diet: Regular Diet Adult as tolerated  DVT Prophylaxis: Enoxaparin (Lovenox) SQ  Olvera Catheter: Not present  Fluids: mIVF NS@125mL/hr  Lines: PRESENT             Cardiac Monitoring: ACTIVE order. Indication: QTc prolonging medication (48 hours)  Code Status: Full Code    Clinically Significant Risk Factors Present on Admission                       # Severe Obesity: Estimated body mass index is 42.93 kg/m  as calculated from the following:    Height as of 12/31/23: 1.651 m (5' 5\").    Weight as of this encounter: 117 kg (258 lb).       # Asthma: noted on problem list        Disposition Plan      Expected Discharge Date: 01/02/2024                The patient's care was discussed with the Attending Physician, Dr. Radha Finney . They will be seen in the morning by Dr. Mireya Constantino.      Shlomo Covington   Hospitalist Service  [facility]  Securely message with [employer] (more info)  Text page via [facility] Paging/Directory   ______________________________________________________________________    Chief Complaint   N/V/D, abdominal pain and distension, unable to tolerate PO intake.    History is obtained from the patient    History of Present Illness   Yahaira Ramirez is a 48 year old female admitted on 1/1/2024. She has a history of colorectal cancer s/p chemotherapy with irinotecan and 5-FU with mets to PAUL s/p wedge resection 11/2023, acquired hypothyroidism, MDD, asthma, cutaneous sarcoidosis, and is admitted for intractable N/V/D.  She had " received her second round of chemotherapy with irinotecan and 5-FU 5 days ago and since then has had intractable nausea and vomiting.    Per chart review: Patient was seen on 12/31/2023 at [facility] emergency room for evaluation of vomiting and diarrhea.  EKG normal.  Given 2 L of IV fluids, IV Zofran, and IV Zyprexa with improvement in symptoms.  Further labs and imaging were not necessary. [place] [facility] was consulted who recommended continuing her home dose of Zyprexa. Recommended follow-up with them.  Given return precautions and discharged home.     She was discharged home in stable condition and was able to sleep well that night.  The next morning she immediately felt nauseous and was unable to tolerate much oral intake, states that she could only eat 1 banana.  No fevers or chills.  She additionally feels like she has abdominal distention but no appreciable abdominal pain.  Because she was unable to tolerate any further food and had nausea that was intractable to Zofran or Compazine at home, she decided to present to the ED again.    On evaluation, she has no tenderness throughout the abdomen and continues to deny any fevers or chills.  She was given droperidol in the ED which provided good relief of her nausea.  CT in the ED did not show any evidence of SBO.  Labs are reassuring, mildly elevated LFTs.    She follows with [place] [facility], is ambulatory at baseline.  Denies any alcohol use, tobacco use, or substance use.    Past Medical History    Past Medical History:   Diagnosis Date    ASCUS on Pap smear 1/23/03    + HPV    Asthma     ASTHMA - MILD INTERMITTENT 8/23/2005    Calculus of gallbladder without mention of cholecystitis or obstruction     CARDIOVASCULAR SCREENING; LDL GOAL LESS THAN 160 10/31/2010    Colon cancer (H)     chemo and radiation therapy started january 9 until february 11, 2017    Depressive disorder 2/22/2011    History of colposcopy with cervical biopsy  2/20/03, 7/23/03    SIMONE I & III, SIMONE I    HUMAN PAPILLOMAVIRUS NOS 3/24/2003    61-low risk    HYPOTHYROIDISM NOS 9/10/2002    Intractable nausea and vomiting 1/1/2024    Mild major depression (H24) 2/22/2011    Other postablative hypothyroidism     iatrogenically induced hypothyroidism after I-31 for hyperthyroidism   abstracted [id number]    Rectal cancer (H) 4/5/2017    Renal stones     HX of       Past Surgical History   Past Surgical History:   Procedure Laterality Date    ABDOMEN SURGERY  4/5/17    Colon resection    C IUD,MIRENA  4/2016    CHOLECYSTECTOMY      COLONOSCOPY Left 12/9/2016    Procedure: COMBINED COLONOSCOPY, SINGLE OR MULTIPLE BIOPSY/POLYPECTOMY BY BIOPSY;  Surgeon: Claudette De La Paz MD;  Location:  GI    COLONOSCOPY N/A 8/14/2017    Procedure: COLONOSCOPY;  COLONOSCOPY ;  Surgeon: Yair Adkins MD;  Location:  GI    COLONOSCOPY N/A 6/1/2020    Procedure: COLONOSCOPY, WITH POLYPECTOMY AND BIOPSY;  Surgeon: Yair Adkins MD;  Location:  GI    DAVINCI COLECTOMY N/A 4/5/2017    Procedure: DAVINCI XI COLECTOMY;  Surgeon: Yair Adkins MD;  Location: [facility]    ENT SURGERY  03/2016    Tonsilectomy    EXCISE MASS UPPER EXTREMITY  12/21/2012    Procedure: EXCISE MASS UPPER EXTREMITY;  Excision Subcutaneous Mass Right Tricep;  Surgeon: Mehul Maldonado MD;  Location:  OR    EYE SURGERY      [facility] CAUTERY OF CERVIX, CRYOCAUTERY  3/23/03    IR LUNG BIOPSY MEDIASTINUM LEFT  10/25/2023    MAMMOPLASTY REDUCTION BILATERAL  7/3/2014    Procedure: MAMMOPLASTY REDUCTION BILATERAL;  Surgeon: Yoanna Mccabe MD;  Location:  SD    SIGMOIDOSCOPY FLEXIBLE N/A 3/21/2017    Procedure: SIGMOIDOSCOPY FLEXIBLE;  Surgeon: Yair Adkins MD;  Location:  GI    TAKEDOWN ILEOSTOMY N/A 9/6/2017    Procedure: TAKEDOWN ILEOSTOMY;  OPEN ILEOSTOMY REVERSAL ;  Surgeon: Yair Adkins MD;  Location:  OR       Prior to Admission Medications   Prior to Admission Medications   Prescriptions Last  Dose Informant Patient Reported? Taking?   ADVAIR DISKUS 250-50 MCG/ACT inhaler 1/1/2024 at am - not with  Yes Yes   Sig: Inhale 1 puff into the lungs 2 times daily   Elderberry 500 MG CAPS 1/1/2024 at am  Yes Yes   Sig: Take 1,000 mg by mouth daily   LORazepam (ATIVAN) 0.5 MG tablet Unknown  Yes Yes   Sig: Take 0.5 mg by mouth every 4 hours as needed for nausea   OLANZapine zydis (ZYPREXA) 5 MG ODT 1/1/2024 at am  No Yes   Sig: Take 1 tablet (5 mg) by mouth daily for 4 days   acetaminophen (TYLENOL) 500 MG tablet Past Week  Yes Yes   Sig: Take 1,000 mg by mouth every 6 hours as needed for mild pain   albuterol (PROAIR HFA/PROVENTIL HFA/VENTOLIN HFA) 108 (90 Base) MCG/ACT inhaler Unknown at has with  Yes Yes   Sig: Inhale 1-2 puffs into the lungs every 4 hours as needed for shortness of breath   ascorbic acid 1000 MG TABS tablet 12/31/2023 at pm  Yes Yes   Sig: Take 2,000 mg by mouth at bedtime   buPROPion (WELLBUTRIN XL) 300 MG 24 hr tablet 1/1/2024 at am  Yes Yes   Sig: Take 300 mg by mouth every morning   fish oil-omega-3 fatty acids 1000 MG capsule 1/1/2024 at am  Yes Yes   Sig: Take 2 g by mouth daily   fish oil-omega-3 fatty acids 1000 MG capsule 12/31/2023 at pm  Yes Yes   Sig: Take 1 g by mouth at bedtime   levothyroxine (SYNTHROID) 137 MCG tablet 1/1/2024 at am  No Yes   Sig: Take 1 tablet (137 mcg) by mouth every morning   ondansetron (ZOFRAN ODT) 8 MG ODT tab Unknown  Yes Yes   Sig: Take 8 mg by mouth every 8 hours as needed for nausea   prochlorperazine (COMPAZINE) 5 MG tablet   Yes No   Sig: Take 5-10 mg by mouth every 6 hours as needed for nausea or vomiting   psyllium (METAMUCIL/KONSYL) capsule 1/1/2024 at am  Yes Yes   Sig: Take 1 capsule by mouth daily   sertraline (ZOLOFT) 100 MG tablet 1/1/2024 at am  Yes Yes   Sig: Take 50 mg by mouth daily   spironolactone (ALDACTONE) 100 MG tablet 1/1/2024 at am  Yes Yes   Sig: Take 100 mg by mouth daily   vitamin D3 (CHOLECALCIFEROL) 125 MCG (5000 UT) tablet  1/1/2024 at   Yes Yes   Sig: Take 5,000 Units by mouth daily      Facility-Administered Medications: None        Review of Systems    The 10 point Review of Systems is negative other than noted in the HPI or here.      Physical Exam   Vital Signs: Temp: 98  F (36.7  C) Temp src: Oral BP: 129/75 Pulse: 93   Resp: 16 SpO2: 100 % O2 Device: None (Room air)    Weight: 258 lbs 0 oz  Constitutional: Awake, alert, cooperative, no apparent distress.  Eyes: Lids and lashes normal, sclera clear, conjunctiva normal.  ENT: Normocephalic, atraumatic.  Dry mucous membranes.  Respiratory: No increased work of breathing, no accessory muscle use, clear to auscultation bilaterally, no crackles or wheezing.  Cardiovascular: Regular rate and rhythm, normal S1 and S2, no S3 or S4, and no murmur noted  GI: Abdomen soft, non-tender, non-distended, no masses palpated. Bowel sounds mildly decreased throughout abdomen.  Skin: No bruising or bleeding and normal skin color, texture, turgor.  Neurologic: Awake, alert, oriented to name, place and time.      Medical Decision Making   Please see A&P for additional details of medical decision making.      Data   ------------------------- PAST 24 HR DATA REVIEWED -----------------------------------------------    I have personally reviewed the following data over the past 24 hrs:    5.0  \   13.5   / 277     137 101 14.2 /  121 (H)   3.6 25 0.83 \     ALT: 136 (H) AST: 74 (H) AP: 97 TBILI: 0.7   ALB: 4.4 TOT PROTEIN: 7.0 LIPASE: N/A       Imaging results reviewed over the past 24 hrs:   Recent Results (from the past 24 hour(s))   CT Abdomen Pelvis w Contrast    Narrative    EXAM: CT ABDOMEN PELVIS W CONTRAST  LOCATION: [facility]  DATE: 1/1/2024    INDICATION: History of metastatic colorectal cancer, vomiting, bloating, watery diarrhea, eval for obstruction, etc.  COMPARISON: PET/CT 11/06/2023  TECHNIQUE: CT scan of the abdomen and pelvis was performed following injection  of IV contrast. Multiplanar reformats were obtained. Dose reduction techniques were used.  CONTRAST: 100 ml Isovue 370    FINDINGS:   LOWER CHEST: Normal.    HEPATOBILIARY: Mild fatty infiltration of the liver. The gallbladder is surgically absent. The portal veins are patent. The bile ducts are normal in caliber.    PANCREAS: Normal.    SPLEEN: Accessory splenule.    ADRENAL GLANDS: Normal.    KIDNEYS/BLADDER: Normal.    BOWEL: There are some scattered air-fluid levels seen in the bowel to include the colon which is nonspecific, but can be seen with changes, such as diarrhea. No inflammation of the bowel identified or changes of obstruction. There are prior postoperative   changes of the bowel with no postoperative complications identified.    LYMPH NODES: Normal.    VASCULATURE: Unremarkable.    PELVIC ORGANS: There is an IUD and this appears to be in good position.    MUSCULOSKELETAL: Degenerative disc disease at L5.      Impression    IMPRESSION:   1.  Air-fluid levels in the bowel to include the colon which can be seen with diarrhea. No evidence for bowel inflammation or obstruction.    2.  No obvious changes of metastatic findings given patient's history of neoplasm.    3.  Postoperative changes of the bowel with no complications identified.    4.  Mild fatty infiltration of the liver.    5.  IUD, this is in good position.

## 2024-01-02 NOTE — PROVIDER NOTIFICATION
"WW-ED 10 JH; Patient takes Spironolactone for Hormonal Acne. States that she has been on it for \"approximately 3 months\". Thanks  "

## 2024-01-02 NOTE — PROGRESS NOTES
[facility]    Progress Note - Hospitalist Service       Date of Admission:  1/1/2024    Assessment & Plan   Yahaira Ramirez is a 48 year old female admitted on 1/1/2024, for intractable nausea, vomiting, and diarrhea 2/2 norovirus. PMHx notable for colorectal cancer s/p chemotherapy with irinotecan and 5-Follow-up with metastasis to PAUL s/p wedge resection 11/2023, acquired hypothyroidism, MDD, asthma, and cutaneous sarcoidosis.     Abdominal distention, N/V, likely chemotherapy associated  Norovirus gastroenteritis  Elevated LFTs   Colorectal adenocarcinoma w/ mets to PAUL s/p wedge resection  2nd round of irinotecan and 5-FU 5 days ago, followed by intractable N/V/D without improvement on Zofran or compazine. Abd/pelvis CT showed no bowel inflammation or obstruction. Good relief with droperidol in the ED, QTc ~440s. Continued droperidol q6h with 2-3 hrs of telemetry per protocol. Pt remains asymptomatic; meclizine given overnight with continued relief this AM. Hepatitis panel non-reactive and influenza swab negative.      Port-a-cath in place, patient prefers meds and fluids through IV  Discontinue droperidol q6h given good response to meclizine overnight  Continue meclizine, 12.5-25 mg q6h PRN  Avoid further D2 antagonism due to risk for EPS  mIVF NS@125mL/hr  Regular diet as tolerated  Daily CMP  K/Mg/Phos, will place order for replacement per protocol if low     Chronic conditions  MDD: Continue PTA bupropion and sertraline  Asthma: Continue PTA Advair and as needed albuterol  Hypothyroidism: Continue PTA Synthroid  LARS: Continue PTA psyllium  Hormonal acne: Continue PTA spironolactone      Metastatic colorectal adenocarcinoma diagnosed in 2017, follows with [place] [facility].  s/p chemotherapy, most recently completed second course of irinotecan and 5-Follow-up  s/p radiation therapy   s/p robotic-assisted low anterior resection with diverting loop ileostomy on 04/2017 s/p  "takedown in 09/2017   s/p PAUL wedge resection for mets in 11/2023.     Observation Goals: -diagnostic tests and consults completed and resulted, -vital signs normal or at patient baseline, -tolerating oral intake to maintain hydration, -Nausea and vomiting controlled., Nurse to notify provider when observation goals have been met and patient is ready for discharge.  Diet: Regular Diet Adult    DVT Prophylaxis: Enoxaparin (Lovenox) SQ  Olvera Catheter: Not present  Fluids: mIVF  mL/hr and PO   Lines: PRESENT             Cardiac Monitoring: ACTIVE order. Indication: QTc prolonging medication (48 hours)  Code Status: Full Code      Clinically Significant Risk Factors Present on Admission        # Hypokalemia: Lowest K = 3.2 mmol/L in last 2 days, will replace as needed   # Hypocalcemia: Lowest Ca = 8.4 mg/dL in last 2 days, will monitor and replace as appropriate              # Severe Obesity: Estimated body mass index is 42.93 kg/m  as calculated from the following:    Height as of 12/31/23: 1.651 m (5' 5\").    Weight as of this encounter: 117 kg (258 lb).       # Asthma: noted on problem list        Disposition Plan      Expected Discharge Date: 01/02/2024 or 01/03/2024, depending on control of N/V.                The patient's care was discussed with Attending Physician, Dr. Mireya Constantino MD.    ANDRZEJ SHAH MD DEEPTI  Hospitalist Service  [facility]  Securely message with [employer] (more info)  Text page via [facility] Paging/Directory   ______________________________________________________________________    Interval History   Pt admitted overnight and remains in observation.     Physical Exam   Vital Signs: Temp: 98  F (36.7  C) Temp src: Oral BP: 110/67 Pulse: 73   Resp: 16 SpO2: 97 % O2 Device: None (Room air)    Weight: 258 lbs 0 oz    General Appearance: Awake, alert, cooperative, no apparent distress, appears ill  Respiratory: No increased work of breathing, clear to " auscultation bilaterally, no wheezing nor crackles  Cardiovascular: Regular rate and rhythm, normal heart sounds, and no mumur noted  GI: Abdomen soft, non-tender, non-distended, no masses palpated, mildly decreased bowel sounds throughout all 4 abdominal quadrants  Skin: No lesions or rashes on exposed skin  Other: No focal deficits     Medical Decision Making       Please see A&P for additional details of medical decision making.      Data     I have personally reviewed the following data over the past 24 hrs:    5.0  \   13.5   / 277     136 104 12.1 /  99   3.4 24 0.76 \     ALT: 136 (H) AST: 70 (H) AP: 91 TBILI: 0.6   ALB: 3.7 TOT PROTEIN: 6.0 (L) LIPASE: N/A       Imaging results reviewed over the past 24 hrs:   Recent Results (from the past 24 hour(s))   CT Abdomen Pelvis w Contrast    Narrative    EXAM: CT ABDOMEN PELVIS W CONTRAST  LOCATION: [facility]  DATE: 1/1/2024    INDICATION: History of metastatic colorectal cancer, vomiting, bloating, watery diarrhea, eval for obstruction, etc.  COMPARISON: PET/CT 11/06/2023  TECHNIQUE: CT scan of the abdomen and pelvis was performed following injection of IV contrast. Multiplanar reformats were obtained. Dose reduction techniques were used.  CONTRAST: 100 ml Isovue 370    FINDINGS:   LOWER CHEST: Normal.    HEPATOBILIARY: Mild fatty infiltration of the liver. The gallbladder is surgically absent. The portal veins are patent. The bile ducts are normal in caliber.    PANCREAS: Normal.    SPLEEN: Accessory splenule.    ADRENAL GLANDS: Normal.    KIDNEYS/BLADDER: Normal.    BOWEL: There are some scattered air-fluid levels seen in the bowel to include the colon which is nonspecific, but can be seen with changes, such as diarrhea. No inflammation of the bowel identified or changes of obstruction. There are prior postoperative   changes of the bowel with no postoperative complications identified.    LYMPH NODES: Normal.    VASCULATURE:  Unremarkable.    PELVIC ORGANS: There is an IUD and this appears to be in good position.    MUSCULOSKELETAL: Degenerative disc disease at L5.      Impression    IMPRESSION:   1.  Air-fluid levels in the bowel to include the colon which can be seen with diarrhea. No evidence for bowel inflammation or obstruction.    2.  No obvious changes of metastatic findings given patient's history of neoplasm.    3.  Postoperative changes of the bowel with no complications identified.    4.  Mild fatty infiltration of the liver.    5.  IUD, this is in good position.

## 2024-01-02 NOTE — PROVIDER NOTIFICATION
MD called to check on patient's nausea. Advised writer to keep monitoring Qt readings especially after dosing a second dose of Droperidol.

## 2024-01-02 NOTE — PLAN OF CARE
"PRIMARY DIAGNOSIS: \"GENERIC\" NURSING  OUTPATIENT/OBSERVATION GOALS TO BE MET BEFORE DISCHARGE:  ADLs back to baseline: Yes    Activity and level of assistance: Ambulating independently.    Pain status: Pain free.    Return to near baseline physical activity: Yes     Discharge Planner Nurse   Safe discharge environment identified: Yes  Barriers to discharge: Yes       Entered by: Estrella Ventura RN 01/02/2024 11:57 AM    Pt A/O x 4. Denies pain. VSS on RA. Nausea intermittent. Pt reports nausea when using saline flushes when flushing IV. Did need a dose of IV meds to help with nausea. Effective per patient. Appetite poor. Snacking on crackers here and there. Up ind. multiple times with loose stools. Tele NS. Denies CP or SOB. IV fluids NS running at 125ml/hr. Able to make needs known.   Estrella Ventura RN    "

## 2024-01-03 PROBLEM — A08.11 ACUTE GASTROENTEROPATHY DUE TO NOROVIRUS: Status: ACTIVE | Noted: 2024-01-03

## 2024-01-03 LAB
PHOSPHATE SERPL-MCNC: 2.1 MG/DL (ref 2.5–4.5)
POTASSIUM SERPL-SCNC: 3.8 MMOL/L (ref 3.4–5.3)

## 2024-01-03 PROCEDURE — 96372 THER/PROPH/DIAG INJ SC/IM: CPT

## 2024-01-03 PROCEDURE — 250N000011 HC RX IP 250 OP 636

## 2024-01-03 PROCEDURE — 250N000013 HC RX MED GY IP 250 OP 250 PS 637

## 2024-01-03 PROCEDURE — 99232 SBSQ HOSP IP/OBS MODERATE 35: CPT | Mod: GC

## 2024-01-03 PROCEDURE — 84100 ASSAY OF PHOSPHORUS: CPT | Performed by: STUDENT IN AN ORGANIZED HEALTH CARE EDUCATION/TRAINING PROGRAM

## 2024-01-03 PROCEDURE — 96361 HYDRATE IV INFUSION ADD-ON: CPT

## 2024-01-03 PROCEDURE — 36415 COLL VENOUS BLD VENIPUNCTURE: CPT | Performed by: STUDENT IN AN ORGANIZED HEALTH CARE EDUCATION/TRAINING PROGRAM

## 2024-01-03 PROCEDURE — 258N000003 HC RX IP 258 OP 636: Performed by: EMERGENCY MEDICINE

## 2024-01-03 PROCEDURE — G0378 HOSPITAL OBSERVATION PER HR: HCPCS

## 2024-01-03 PROCEDURE — 250N000013 HC RX MED GY IP 250 OP 250 PS 637: Performed by: STUDENT IN AN ORGANIZED HEALTH CARE EDUCATION/TRAINING PROGRAM

## 2024-01-03 PROCEDURE — 84132 ASSAY OF SERUM POTASSIUM: CPT | Performed by: STUDENT IN AN ORGANIZED HEALTH CARE EDUCATION/TRAINING PROGRAM

## 2024-01-03 RX ADMIN — POTASSIUM & SODIUM PHOSPHATES POWDER PACK 280-160-250 MG 1 PACKET: 280-160-250 PACK at 10:23

## 2024-01-03 RX ADMIN — LEVOTHYROXINE SODIUM 137 MCG: 0.14 TABLET ORAL at 06:16

## 2024-01-03 RX ADMIN — BUPROPION HYDROCHLORIDE 300 MG: 300 TABLET, EXTENDED RELEASE ORAL at 08:39

## 2024-01-03 RX ADMIN — SPIRONOLACTONE 100 MG: 25 TABLET, FILM COATED ORAL at 08:39

## 2024-01-03 RX ADMIN — ENOXAPARIN SODIUM 40 MG: 100 INJECTION SUBCUTANEOUS at 00:06

## 2024-01-03 RX ADMIN — Medication 1 CAPSULE: at 08:39

## 2024-01-03 RX ADMIN — SODIUM CHLORIDE: 9 INJECTION, SOLUTION INTRAVENOUS at 08:44

## 2024-01-03 RX ADMIN — POTASSIUM & SODIUM PHOSPHATES POWDER PACK 280-160-250 MG 1 PACKET: 280-160-250 PACK at 14:47

## 2024-01-03 RX ADMIN — SERTRALINE 50 MG: 50 TABLET, FILM COATED ORAL at 08:39

## 2024-01-03 RX ADMIN — POTASSIUM & SODIUM PHOSPHATES POWDER PACK 280-160-250 MG 1 PACKET: 280-160-250 PACK at 18:38

## 2024-01-03 RX ADMIN — ENOXAPARIN SODIUM 40 MG: 100 INJECTION SUBCUTANEOUS at 23:37

## 2024-01-03 RX ADMIN — FLUTICASONE FUROATE AND VILANTEROL TRIFENATATE 1 PUFF: 100; 25 POWDER RESPIRATORY (INHALATION) at 08:39

## 2024-01-03 RX ADMIN — Medication 1 CAPSULE: at 20:37

## 2024-01-03 ASSESSMENT — ACTIVITIES OF DAILY LIVING (ADL)
ADLS_ACUITY_SCORE: 32

## 2024-01-03 NOTE — PLAN OF CARE
"PRIMARY DIAGNOSIS: \"GENERIC\" NURSING  OUTPATIENT/OBSERVATION GOALS TO BE MET BEFORE DISCHARGE:  ADLs back to baseline: No    Activity and level of assistance: Ambulating independently.    Pain status: Pain free.    Return to near baseline physical activity: No     Discharge Planner Nurse   Safe discharge environment identified: Yes  Barriers to discharge: Yes       Entered by: Kashmir Chung RN 01/02/2024 2000 pm     Please review provider order for any additional goals.   Nurse to notify provider when observation goals have been met and patient is ready for discharge.Goal Outcome Evaluation:    Patient alert and oriented x4, speech clear.  Lungs diminished and clear.  Denies SOB.  Patient bowel sounds positive.  Last BM 1/2/23 x2 loose BM.  Voiding concentrated urine.  Up to to bathroom independently.  PIV patent infusing  ml/hr continuous.  Port a cath not accessed.  On telemetry.  Skin intact.  Patient feels weak.  No nausea after supper.      "

## 2024-01-03 NOTE — PLAN OF CARE
"PRIMARY DIAGNOSIS: \"GENERIC\" NURSING  OUTPATIENT/OBSERVATION GOALS TO BE MET BEFORE DISCHARGE:  ADLs back to baseline: Yes    Activity and level of assistance: Ambulating independently.    Pain status: Pain free.    Return to near baseline physical activity: Yes     Discharge Planner Nurse   Safe discharge environment identified: Yes  Barriers to discharge: Yes       Entered by: Anali Hodgson RN 01/03/2024      Please review provider order for any additional goals.   Nurse to notify provider when observation goals have been met and patient is ready for discharge.Goal Outcome Evaluation:  "

## 2024-01-03 NOTE — PROGRESS NOTES
[facility]    Progress Note - Hospitalist Service       Date of Admission:  1/1/2024    Assessment & Plan   Yahaira Ramirez is a 48 year old female admitted on 1/1/2024, for intractable nausea, vomiting, and diarrhea 2/2 norovirus. PMHx notable for colorectal cancer s/p chemotherapy with irinotecan and 5-Follow-up with metastasis to PAUL s/p wedge resection 11/2023, acquired hypothyroidism, MDD, asthma, and cutaneous sarcoidosis.     Abdominal distention, N/V, likely chemotherapy associated  Norovirus gastroenteritis  Elevated LFTs   Colorectal adenocarcinoma w/ mets to PAUL s/p wedge resection  2nd round of irinotecan and 5-FU last week, followed by intractable N/V/D without improvement on Zofran or compazine. Abd/pelvis CT showed no bowel inflammation or obstruction. Good relief with droperidol in the ED, QTc ~440s. Given 2nd dose of droperidol with transition to meclizine. Maintaining PO fluids. Nauseated after earlier breakfast and diarrhea continues. Assuming pt tolerates PO without mIVF, consider discharge later today.       Port-a-cath in place, patient prefers meds and fluids through IV  Continue meclizine, 12.5-25 mg q6h PRN  Discontinue mIVF NS@125mL/hr  Regular diet as tolerated  Daily CMP  K/Mg/Phos, will place order for replacement per protocol if low     Chronic conditions  MDD: Continue PTA bupropion and sertraline  Asthma: Continue PTA Advair and as needed albuterol  Hypothyroidism: Continue PTA Synthroid  LARS: Continue PTA psyllium  Hormonal acne: Continue PTA spironolactone      Metastatic colorectal adenocarcinoma diagnosed in 2017, follows with [place] [facility].  s/p chemotherapy, most recently completed second course of irinotecan and 5-Follow-up  s/p radiation therapy   s/p robotic-assisted low anterior resection with diverting loop ileostomy on 04/2017 s/p takedown in 09/2017   s/p PAUL wedge resection for mets in 11/2023.     Observation Goals: -diagnostic  "tests and consults completed and resulted, -vital signs normal or at patient baseline, -tolerating oral intake to maintain hydration, -Nausea and vomiting controlled., Nurse to notify provider when observation goals have been met and patient is ready for discharge.  Diet: Regular Diet Adult    DVT Prophylaxis: Enoxaparin (Lovenox) SQ  Olvera Catheter: Not present  Fluids: PO   Lines: PRESENT      Port a Cath 11/17/23 Single Lumen Right Chest wall-Site Assessment: Other (Comment) (not accessed)      Cardiac Monitoring: ACTIVE order. Indication: QTc prolonging medication (48 hours)  Code Status: Full Code      Clinically Significant Risk Factors Present on Admission        # Hypokalemia: Lowest K = 3.2 mmol/L in last 2 days, will replace as needed   # Hypocalcemia: Lowest Ca = 8.4 mg/dL in last 2 days, will monitor and replace as appropriate              # Severe Obesity: Estimated body mass index is 43.45 kg/m  as calculated from the following:    Height as of this encounter: 1.651 m (5' 5\").    Weight as of this encounter: 118.4 kg (261 lb 1.6 oz).       # Asthma: noted on problem list        Disposition Plan      Expected Discharge Date: 01/02/2024 or 01/03/2024, depending on control of N/V.                The patient's care was discussed with Attending Physician, Dr. Mireya Constantino MD.    ANDRZEJ SHAH MD DEEPTI  Hospitalist Service  [facility]  Securely message with [employer] (more info)  Text page via [facility] Paging/Directory   ______________________________________________________________________    Interval History   Reviewed updated notes. Pt reported feeling better than yesterday and finding meclazine helpful to control nausea. Maintaining fluid intake but felt nauseated after eating breakfast. Diarrhea continues about every hour or so. Pt appeared anxious when bringing up discharge, as she worried about becoming dehydrated without IV fluids and diarrhea remaining. Informed pt " about plan to monitor her with PO only and re-assess discharge later today.     Physical Exam   Vital Signs: Temp: 98.1  F (36.7  C) Temp src: Oral BP: (!) 141/86 Pulse: 76   Resp: 18 SpO2: 99 % O2 Device: None (Room air)    Weight: 261 lbs 1.6 oz    General Appearance: Awake, alert, cooperative, no apparent distress  Respiratory: No increased work of breathing, clear to auscultation bilaterally, no wheezing nor crackles  Cardiovascular: Regular rate and rhythm, normal heart sounds, and no mumur noted  GI: Abdomen soft, non-tender, non-distended, no masses palpated, normal bowel sounds  Skin: No lesions or rashes on exposed skin  Other: No focal deficits     Medical Decision Making       Please see A&P for additional details of medical decision making.      Data     I have personally reviewed the following data over the past 24 hrs:    N/A  \   N/A   / N/A     N/A N/A N/A /  N/A   3.8 N/A N/A \       Imaging results reviewed over the past 24 hrs:   No results found for this or any previous visit (from the past 24 hour(s)).

## 2024-01-03 NOTE — PLAN OF CARE
"PRIMARY DIAGNOSIS: \"GENERIC\" NURSING  OUTPATIENT/OBSERVATION GOALS TO BE MET BEFORE DISCHARGE:  ADLs back to baseline: No    Activity and level of assistance: Ambulating independently.    Pain status: Pain free.    Return to near baseline physical activity: Yes     Discharge Planner Nurse   Safe discharge environment identified: Yes  Barriers to discharge: No       Entered by: Kashmir Chung RN 01/03/2024 4:24 AM     Please review provider order for any additional goals.   Nurse to notify provider when observation goals have been met and patient is ready for discharge.Goal Outcome Evaluation:     Patient alert and oriented x 4, speech clear.  Denies pain.  Patient reports,\"Feeling bloated and gasy,   Last BM 1/3/23. Voiding concentrated urine.  Lungs clear and diminished, on room air.  Up to bathroom independently gait steady.  On telemetry. NSR 73. Has deaccessed port a cath.  PIV infusing  ml/hr continuous. Potassium 4.0, Phosphorus recheck in am. On enteric precautions       "

## 2024-01-03 NOTE — PLAN OF CARE
"PRIMARY DIAGNOSIS: GENERALIZED WEAKNESS    OUTPATIENT/OBSERVATION GOALS TO BE MET BEFORE DISCHARGE  1. Orthostatic performed: N/A    2. Tolerating PO medications: Yes    3. Return to near baseline physical activity: No    4. Cleared for discharge by consultants (if involved): No    Discharge Planner Nurse   Safe discharge environment identified: Yes  Barriers to discharge: Yes       Entered by: Kashmir Chung RN 01/03/2024 1:04 AM     Please review provider order for any additional goals.   Nurse to notify provider when observation goals have been met and patient is ready for discharge.Goal Outcome Evaluation:    Patient alert and oriented x 4, speech clear.  Denies pain.  Patient reports,\"Feeling bloated and gasy, Had 2 stools.  Last BM 1/3/23. Voiding concentrated urine.  Lungs clear and diminished, on room air.  Up to bathroom independently gait steady.  On telemetry. Has deaccessed port a cath.  PIV infusing  ml/hr continuous. Potassium 4.0, Phosphorus recheck in am. On enteric precautions      "

## 2024-01-03 NOTE — PLAN OF CARE
"PRIMARY DIAGNOSIS: \"GENERIC\" NURSING  OUTPATIENT/OBSERVATION GOALS TO BE MET BEFORE DISCHARGE:  ADLs back to baseline: Yes    Activity and level of assistance: Ambulating independently.    Pain status: Pain free.    Return to near baseline physical activity: Yes     Discharge Planner Nurse   Safe discharge environment identified: Yes  Barriers to discharge: Yes       Entered by: Anali Hodgsno RN 01/03/2024    Patient only had one BM/diarrhea on shift.  Denies pain, but did endorse abdominal discomfort and bloating after eating 75% of breakfast.  Did not eat lunch, but is going to try to eat dinner later.  IVF discontinued, encouraged water intake.  Tele NSR.  K and P protocols, rechecks tomorrow AM.  Anticipated discharge home tonight or tomorrow.  Please review provider order for any additional goals.   Nurse to notify provider when observation goals have been met and patient is ready for discharge.Goal Outcome Evaluation:    "

## 2024-01-04 VITALS
HEIGHT: 65 IN | TEMPERATURE: 98.7 F | DIASTOLIC BLOOD PRESSURE: 65 MMHG | BODY MASS INDEX: 43.32 KG/M2 | HEART RATE: 86 BPM | SYSTOLIC BLOOD PRESSURE: 120 MMHG | RESPIRATION RATE: 16 BRPM | WEIGHT: 260 LBS | OXYGEN SATURATION: 97 %

## 2024-01-04 LAB
PHOSPHATE SERPL-MCNC: 3.3 MG/DL (ref 2.5–4.5)
PLATELET # BLD AUTO: 237 10E3/UL (ref 150–450)
POTASSIUM SERPL-SCNC: 3.5 MMOL/L (ref 3.4–5.3)

## 2024-01-04 PROCEDURE — 84132 ASSAY OF SERUM POTASSIUM: CPT | Performed by: STUDENT IN AN ORGANIZED HEALTH CARE EDUCATION/TRAINING PROGRAM

## 2024-01-04 PROCEDURE — 85049 AUTOMATED PLATELET COUNT: CPT

## 2024-01-04 PROCEDURE — 250N000013 HC RX MED GY IP 250 OP 250 PS 637

## 2024-01-04 PROCEDURE — 84100 ASSAY OF PHOSPHORUS: CPT | Performed by: STUDENT IN AN ORGANIZED HEALTH CARE EDUCATION/TRAINING PROGRAM

## 2024-01-04 PROCEDURE — 99238 HOSP IP/OBS DSCHRG MGMT 30/<: CPT | Mod: GC

## 2024-01-04 PROCEDURE — 36415 COLL VENOUS BLD VENIPUNCTURE: CPT

## 2024-01-04 PROCEDURE — G0378 HOSPITAL OBSERVATION PER HR: HCPCS

## 2024-01-04 RX ORDER — MECLIZINE HCL 12.5 MG 12.5 MG/1
12.5 TABLET ORAL EVERY 6 HOURS PRN
Qty: 12 TABLET | Refills: 0 | Status: SHIPPED | OUTPATIENT
Start: 2024-01-04

## 2024-01-04 RX ADMIN — SERTRALINE 50 MG: 50 TABLET, FILM COATED ORAL at 09:27

## 2024-01-04 RX ADMIN — BUPROPION HYDROCHLORIDE 300 MG: 300 TABLET, EXTENDED RELEASE ORAL at 09:27

## 2024-01-04 RX ADMIN — SPIRONOLACTONE 100 MG: 25 TABLET, FILM COATED ORAL at 09:27

## 2024-01-04 RX ADMIN — LEVOTHYROXINE SODIUM 137 MCG: 0.14 TABLET ORAL at 05:08

## 2024-01-04 ASSESSMENT — ACTIVITIES OF DAILY LIVING (ADL)
ADLS_ACUITY_SCORE: 32

## 2024-01-04 NOTE — PLAN OF CARE
"PRIMARY DIAGNOSIS: \"GENERIC\" NURSING  OUTPATIENT/OBSERVATION GOALS TO BE MET BEFORE DISCHARGE:  ADLs back to baseline: Yes    Activity and level of assistance: Ambulating independently.    Pain status: Pain free.    Return to near baseline physical activity: Yes     Discharge Planner Nurse   Safe discharge environment identified: Yes  Barriers to discharge: Yes       Entered by: Kashmir Chung RN 01/04/2024 0000 am     Please review provider order for any additional goals.   Nurse to notify provider when observation goals have been met and patient is ready for discharge.Goal Outcome Evaluation:    Patient alert and oriented x 4, speech clear.  Lungs clear, No SOB or cough. Denies pain.  Up to bathroom independently to void and defecate.  No edema.  Patient reports, 2 stools up to 2330.  Voiding concentrated urine.  Denies nausea. No vomiting.  Reviewed with patient BRAT diet, avoiding greasy or spicy foods, nutritional supplements and other foods patient like and how to advance diet.  Reviewed mobility and prevention of falls.  Reviewed washing hand and gel.  Tele SR 70's      "

## 2024-01-04 NOTE — PROGRESS NOTES
Writer spoke with patient on the phone to let patient know that she left the Unit without the After Visit Summary paperwork which Writer told patient prior to discharge that Writer would be going through the AVS with patient after Pharmacist review the medication list. Writer noted Patient verbalized apology on the phone for leaving without the AVS.  Writer told Patient that medication meclizine has been sent to patient's preferred pharmacy and asked patient if patient has picked up medication.  Patient told Writer that she has not picked up the medication yet but will do so.  Writer offered to meet up the patient at the [facility] if she is able to drive by today and would go through the AVS but patient told Writer that she is already home, and asked Writer if the AVS can be mailed to her home address. Writer checked with Charge Nurse and told Writer can't mail AVS, but can leave it at the  [facility] lob for patient to . Writer informed patient about it and verbalized in agreement to  the AVS at the , and requested to have both patient's name and mother's name on the envelope, patient denied any other request and verbalized apology again for waiting for the AVS. Writer gave the envelope with AVS in it both patient and mother's name on the envelope to the  staff. Charge Nurse made aware. -Awilda CHAKRABORTY RN

## 2024-01-04 NOTE — PLAN OF CARE
"PRIMARY DIAGNOSIS: \"GENERIC\" NURSING  OUTPATIENT/OBSERVATION GOALS TO BE MET BEFORE DISCHARGE:  ADLs back to baseline: Yes    Activity and level of assistance: Ambulating independently.    Pain status: Pain free.    Return to near baseline physical activity: Yes     Discharge Planner Nurse   Safe discharge environment identified: Yes  Barriers to discharge: No       Entered by: Kashmir Chung RN 01/04/2024 5:34 AM     Please review provider order for any additional goals.   Nurse to notify provider when observation goals have been met and patient is ready for discharge.Goal Outcome Evaluation:      Patient alert and oriented x 4, speech clear, lungs clear no sob, tele:NSR, no edema, denies pain.  Discharge pending today to home with family support.    "

## 2024-01-04 NOTE — PROGRESS NOTES
OUTPATIENT/OBSERVATION GOALS TO BE MET BEFORE DISCHARGE  1. Orthostatic performed: N/A    2. Tolerating PO fluid and/or antibiotics (if applicable): Yes    3. Nausea/Vomiting/Diarrhea symptoms improved: Yes    4. Pain status: Pain free.    5. Return to near baseline physical activity: Yes    Discharge Planner Nurse   Safe discharge environment identified: Yes  Barriers to discharge: Yes       Entered by: CINDY GARZA RN 01/03/2024 10:44 PM     Please review provider order for any additional goals.   Nurse to notify provider when observation goals have been met and patient is ready for discharge.al activity: Yes    Discharge Planner Nurse   Safe discharge environment identified: Yes  Barriers to discharge: Yes       Entered by: CINDY GARZA RN 01/03/2024 10:41 PM     A & O x4, feeling better, tolerating liquids and small amount of food. 1 loose stool. No abd pain or nausea, vomiting. Discharging tomorrow. Tele NSR.

## 2024-01-04 NOTE — PROGRESS NOTES
"PRIMARY DIAGNOSIS: \"GENERIC\" NURSING  OUTPATIENT/OBSERVATION GOALS TO BE MET BEFORE DISCHARGE:  ADLs back to baseline: Yes    Activity and level of assistance: Ambulating independently.    Pain status: Pain free.    Return to near baseline physical activity: Yes     Discharge Planner Nurse   Safe discharge environment identified: Yes  Barriers to discharge: No       Entered by: Awilda Cheema RN 01/04/2024 9:45 AM    BFM at the bedside medically cleared Patient for discharge, PIV removed, port not accessed.     Please review provider order for any additional goals.   Nurse to notify provider when observation goals have been met and patient is ready for discharge.  "

## 2024-01-04 NOTE — PROGRESS NOTES
PRIMARY DIAGNOSIS: GASTROENTERITIS    OUTPATIENT/OBSERVATION GOALS TO BE MET BEFORE DISCHARGE  1. Orthostatic performed: N/A    2. Tolerating PO fluid and/or antibiotics (if applicable): Yes    3. Nausea/Vomiting/Diarrhea symptoms improved: Yes    4. Pain status: Pain free.    5. Return to near baseline physical activity: Yes    Discharge Planner Nurse   Safe discharge environment identified: Yes  Barriers to discharge: Yes       Entered by: CINDY GARZA RN 01/03/2024 10:38 PM     A & O x4, feeling abdominal discomfort but not nauseated. Trying to drink fluids (IV fluids discontinued). No diarrhea this afternoon. Feeling sad, tearful. Will stay another night.

## 2024-01-04 NOTE — DISCHARGE SUMMARY
"[facility]  Discharge Summary - Medicine & Pediatrics       Date of Admission:  1/1/2024  Date of Discharge:  1/4/2024 10:29 AM  Discharging Provider: Mireya Constantino MD  Discharge Service: Hospitalist Service    Discharge Diagnoses   Gastroenteritis 2/2 norovirus, improved  Abdominal distension, N/V, likely chemotherapy associated, improved  Elevated LFT's  Colorectal adenocarcinoma w/ mets to PAUL s/p wedge resection    Chronic Conditions  MDD  Asthma  Hypothyroidism  LARS  Hormonal acne     Clinically Significant Risk Factors     # Severe Obesity: Estimated body mass index is 43.27 kg/m  as calculated from the following:    Height as of this encounter: 1.651 m (5' 5\").    Weight as of this encounter: 117.9 kg (260 lb).       Follow-ups Needed After Discharge   Follow-up Appointments     Follow-up and recommended labs and tests       Follow up with primary care provider, [facility], within   7 days for hospital follow- up.  No follow up labs or test are needed.          Unresulted Labs Ordered in the Past 30 Days of this Admission       No orders found from 12/2/2023 to 1/2/2024.          Discharge Disposition   Discharged to home  Condition at discharge: Stable    Hospital Course   Yahaira Ramirez was admitted on 1/1/2024, for intractable N/V/D 2/2 norovirus. Pt rec'd 2nd round of irinotecan and 5-FU last week, followed by intractable N/V/D without improvement on home regimen of Zofran or compazine. Abd/pelvis CT showed no bowel inflammation or obstruction. Given maintenance fluids for rehydration and droperidol x 2 doses to control N/V. Pt transitioned to meclizine with good relief. Advanced diet as tolerated and asymptomatic at discharge. The following problems were addressed during her hospitalization:    Gastroenteritis 2/2 norovirus, improved  Abdominal distension, N/V, likely chemotherapy associated, improved  Elevated LFT's  Colorectal adenocarcinoma w/ mets to " PAUL s/p wedge resection    Chronic Conditions  MDD - Continued PTA bupropion and sertraline   Asthma - Continued PTA advair and PRN albuterol  Hypothyroidism - Continued PTA synthroid   LARS - Continue PTA psyllium  Hormonal acne - Continued PTA spironolactone     Consultations This Hospital Stay   None    Code Status   Full Code       The patient was discussed with Attending Physician, Dr. Mireya Constantino MD.     ANDRZEJ SHAH MD JD PGY-1  [address] [address]  Phone: [phone]  Fax: [phone]  ______________________________________________________________________    Physical Exam   Vital Signs: Temp: 98.7  F (37.1  C) Temp src: Oral BP: 120/65 Pulse: 86   Resp: 16 SpO2: 97 % O2 Device: None (Room air)    Weight: 260 lbs 0 oz    General Appearance:  Awake, alert, cooperative, no apparent distress  Respiratory: No increased work of breathing, clear to auscultation bilaterally, no wheezing nor crackles  Cardiovascular: Regular rate and rhythm, normal heart sounds, and no mumur noted  GI: Abdomen soft, non-tender, non-distended, no masses palpated, normal bowel sounds  Skin: No lesions or rashes on exposed skin  Other: No focal deficits      Primary Care Physician   [facility]    Discharge Orders      Reason for your hospital stay    Gastroenteritis 2/2 Norovirus, complicated by chemo-associated nausea and vomiting     Follow-up and recommended labs and tests     Follow up with primary care provider, [facility], within 7 days for hospital follow- up.  No follow up labs or test are needed.     Activity    Your activity upon discharge: activity as tolerated     Diet    Follow this diet upon discharge: Regular Diet Adult, avoiding greasy and spicy foods until gastroenteritis fully resolves       Significant Results and Procedures   Most Recent 3 CBC's:  Recent Labs   Lab Test  01/04/24  0552 01/01/24  1644 12/31/23  1115 08/20/18  0717   WBC  --  5.0 2.9* 4.6   HGB  --  13.5 13.8 11.3*   MCV  --  93 93 98    277 287 336   ,   Results for orders placed or performed during the hospital encounter of 01/01/24   CT Abdomen Pelvis w Contrast    Narrative    EXAM: CT ABDOMEN PELVIS W CONTRAST  LOCATION: [facility]  DATE: 1/1/2024    INDICATION: History of metastatic colorectal cancer, vomiting, bloating, watery diarrhea, eval for obstruction, etc.  COMPARISON: PET/CT 11/06/2023  TECHNIQUE: CT scan of the abdomen and pelvis was performed following injection of IV contrast. Multiplanar reformats were obtained. Dose reduction techniques were used.  CONTRAST: 100 ml Isovue 370    FINDINGS:   LOWER CHEST: Normal.    HEPATOBILIARY: Mild fatty infiltration of the liver. The gallbladder is surgically absent. The portal veins are patent. The bile ducts are normal in caliber.    PANCREAS: Normal.    SPLEEN: Accessory splenule.    ADRENAL GLANDS: Normal.    KIDNEYS/BLADDER: Normal.    BOWEL: There are some scattered air-fluid levels seen in the bowel to include the colon which is nonspecific, but can be seen with changes, such as diarrhea. No inflammation of the bowel identified or changes of obstruction. There are prior postoperative   changes of the bowel with no postoperative complications identified.    LYMPH NODES: Normal.    VASCULATURE: Unremarkable.    PELVIC ORGANS: There is an IUD and this appears to be in good position.    MUSCULOSKELETAL: Degenerative disc disease at L5.      Impression    IMPRESSION:   1.  Air-fluid levels in the bowel to include the colon which can be seen with diarrhea. No evidence for bowel inflammation or obstruction.    2.  No obvious changes of metastatic findings given patient's history of neoplasm.    3.  Postoperative changes of the bowel with no complications identified.    4.  Mild fatty infiltration of the liver.    5.  IUD, this is  in good position.       Discharge Medications   Discharge Medication List as of 1/4/2024 10:27 AM        START taking these medications    Details   meclizine (ANTIVERT) 12.5 MG tablet Take 1 tablet (12.5 mg) by mouth every 6 hours as needed for nausea, Disp-12 tablet, R-0, E-Prescribe           CONTINUE these medications which have NOT CHANGED    Details   acetaminophen (TYLENOL) 500 MG tablet Take 1,000 mg by mouth every 6 hours as needed for mild pain, Historical      ADVAIR DISKUS 250-50 MCG/ACT inhaler Inhale 1 puff into the lungs 2 times daily, [place], Historical      albuterol (PROAIR HFA/PROVENTIL HFA/VENTOLIN HFA) 108 (90 Base) MCG/ACT inhaler Inhale 1-2 puffs into the lungs every 4 hours as needed for shortness of breath, Historical      ascorbic acid 1000 MG TABS tablet Take 2,000 mg by mouth at bedtime, Historical      buPROPion (WELLBUTRIN XL) 300 MG 24 hr tablet Take 300 mg by mouth every morning, Historical      Elderberry 500 MG CAPS Take 1,000 mg by mouth daily, Historical      !! fish oil-omega-3 fatty acids 1000 MG capsule Take 1 g by mouth at bedtime, Historical      !! fish oil-omega-3 fatty acids 1000 MG capsule Take 2 g by mouth daily, Historical      levothyroxine (SYNTHROID) 137 MCG tablet Take 1 tablet (137 mcg) by mouth every morning, Disp-90 tablet, R-3, E-Prescribe      LORazepam (ATIVAN) 0.5 MG tablet Take 0.5 mg by mouth every 4 hours as needed for nausea, Historical      OLANZapine zydis (ZYPREXA) 5 MG ODT Take 1 tablet (5 mg) by mouth daily for 4 days, Disp-8 tablet, R-0, E-Prescribe      ondansetron (ZOFRAN ODT) 8 MG ODT tab Take 8 mg by mouth every 8 hours as needed for nausea, Historical      psyllium (METAMUCIL/KONSYL) capsule Take 1 capsule by mouth daily, Historical      sertraline (ZOLOFT) 100 MG tablet Take 50 mg by mouth daily, Historical      spironolactone (ALDACTONE) 100 MG tablet Take 100 mg by mouth daily, Historical      vitamin D3 (CHOLECALCIFEROL) 125 MCG (5000 UT)  tablet Take 5,000 Units by mouth daily, Historical      prochlorperazine (COMPAZINE) 5 MG tablet Take 5-10 mg by mouth every 6 hours as needed for nausea or vomiting, Historical       !! - Potential duplicate medications found. Please discuss with provider.        Allergies   Allergies   Allergen Reactions    Amoxicillin Shortness Of Breath    Clavulanic Acid Shortness Of Breath    Sulfa Antibiotics Shortness Of Breath and Hives    Adhesive Tape Itching and Rash    Cats     Meperidine Nausea and Vomiting    Pollen Extract

## 2024-01-04 NOTE — PROGRESS NOTES
DANIEL informed Writer that patient has already left the Unit, wheeled to the main lobby as Patient's mother waiting to transport.  Prior to discharge Writer went through the medication lists and told Patient it is awaiting Pharmacist to review, and when done will be printing the AVS and will go through discharge paperwork with patient. However, DANIEL told Writer patient could not wait, told DANIEL she already got the paperwork, and wanted to go home.  Writer noted that BFM was at the bedside during discharge follow up and gave patient the paperwork Patient requested but it was not the AVS. Writer did not had the chance to discuss AVS with Patient. -Awilda CHAKRABORTY RN

## 2024-01-05 ENCOUNTER — PATIENT OUTREACH (OUTPATIENT)
Dept: CARE COORDINATION | Facility: CLINIC | Age: 49
End: 2024-01-05
Payer: COMMERCIAL

## 2024-01-05 NOTE — PROGRESS NOTES
[facility]: [facility]: Post-Discharge Note  SITUATION                                                      Admission:    Admission Date: 01/01/24   Reason for Admission: Gastroenteritis 2/2 Norovirus, complicated by chemo-associated nausea and vomiting  Discharge:   Discharge Date: 01/04/24  Discharge Diagnosis: Gastroenteritis 2/2 Norovirus, complicated by chemo-associated nausea and vomiting    BACKGROUND                                                      Per hospital discharge summary and inpatient provider notes:    Yahaira Ramirez is a 48 year old female admitted on 1/1/2024. She has a history of colorectal cancer s/p chemotherapy with irinotecan and 5-FU with mets to PAUL s/p wedge resection 11/2023, acquired hypothyroidism, MDD, asthma, cutaneous sarcoidosis, and is admitted for intractable N/V/D.  She had received her second round of chemotherapy with irinotecan and 5-FU 5 days ago and since then has had intractable nausea and vomiting.     Per chart review: Patient was seen on 12/31/2023 at [facility] emergency room for evaluation of vomiting and diarrhea.  EKG normal.  Given 2 L of IV fluids, IV Zofran, and IV Zyprexa with improvement in symptoms.  Further labs and imaging were not necessary. [place] [facility] was consulted who recommended continuing her home dose of Zyprexa. Recommended follow-up with them.  Given return precautions and discharged home.      She was discharged home in stable condition and was able to sleep well that night.  The next morning she immediately felt nauseous and was unable to tolerate much oral intake, states that she could only eat 1 banana.  No fevers or chills.  She additionally feels like she has abdominal distention but no appreciable abdominal pain.  Because she was unable to tolerate any further food and had nausea that was intractable to Zofran or Compazine at home, she decided to present to the ED again.        ASSESSMENT           Discharge Assessment  How are you doing now that you are home?: I am getting better each day.  How are your symptoms? (Red Flag symptoms escalate to triage hotline per guidelines): Improved  Do you feel your condition is stable enough to be safe at home until your provider visit?: Yes  Does the patient have their discharge instructions? : Yes (Pt forgot paperwork at hospital but it is ready at the  for her to .)  Does the patient have questions regarding their discharge instructions? : No  Were you started on any new medications or were there changes to any of your previous medications? : Yes  Does the patient have all of their medications?: Yes  Do you have questions regarding any of your medications? : No  Discharge follow-up appointment scheduled within 14 calendar days? : Yes  Discharge Follow Up Appointment Date: 01/08/24  Discharge Follow Up Appointment Scheduled with?: Specialty Care Provider                PLAN                                                      Outpatient Plan:  Follow up with primary care provider, [facility], within 7 days for hospital follow- up. No follow up labs  or test are needed.    No future appointments.      For any urgent concerns, please contact our 24 hour nurse triage line: [phone])       ELISABETH Crowder  [phone]  [facility]

## 2025-05-05 ENCOUNTER — HOSPITAL ENCOUNTER (EMERGENCY)
Facility: CLINIC | Age: 50
Discharge: HOME | End: 2025-05-05
Admitting: EMERGENCY MEDICINE
Payer: COMMERCIAL

## 2025-05-05 ENCOUNTER — APPOINTMENT (OUTPATIENT)
Dept: CT IMAGING | Facility: CLINIC | Age: 50
End: 2025-05-05
Attending: EMERGENCY MEDICINE
Payer: COMMERCIAL

## 2025-05-05 VITALS
BODY MASS INDEX: 41.65 KG/M2 | TEMPERATURE: 97.5 F | HEIGHT: 65 IN | DIASTOLIC BLOOD PRESSURE: 77 MMHG | OXYGEN SATURATION: 98 % | SYSTOLIC BLOOD PRESSURE: 136 MMHG | WEIGHT: 250 LBS | RESPIRATION RATE: 24 BRPM | HEART RATE: 91 BPM

## 2025-05-05 DIAGNOSIS — M25.512 LEFT SHOULDER PAIN: ICD-10-CM

## 2025-05-05 DIAGNOSIS — N64.4 BREAST PAIN: ICD-10-CM

## 2025-05-05 DIAGNOSIS — R07.9 CHEST PAIN: ICD-10-CM

## 2025-05-05 LAB
ALBUMIN SERPL BCG-MCNC: 4.6 G/DL (ref 3.5–5.2)
ALBUMIN UR-MCNC: NEGATIVE MG/DL
ALP SERPL-CCNC: 105 U/L (ref 40–150)
ALT SERPL W P-5'-P-CCNC: 19 U/L (ref 0–50)
ANION GAP SERPL CALCULATED.3IONS-SCNC: 11 MMOL/L (ref 7–15)
APPEARANCE UR: CLEAR
AST SERPL W P-5'-P-CCNC: 18 U/L (ref 0–45)
ATRIAL RATE - MUSE: 97 BPM
BILIRUB DIRECT SERPL-MCNC: 0.2 MG/DL (ref 0–0.3)
BILIRUB SERPL-MCNC: 0.5 MG/DL
BILIRUB UR QL STRIP: NEGATIVE
BUN SERPL-MCNC: 15 MG/DL (ref 6–20)
CALCIUM SERPL-MCNC: 10.5 MG/DL (ref 8.8–10.4)
CHLORIDE SERPL-SCNC: 102 MMOL/L (ref 98–107)
COLOR UR AUTO: COLORLESS
CREAT SERPL-MCNC: 0.78 MG/DL (ref 0.51–0.95)
D DIMER PPP FEU-MCNC: 0.34 UG/ML FEU (ref 0–0.5)
DIASTOLIC BLOOD PRESSURE - MUSE: NORMAL MMHG
EGFRCR SERPLBLD CKD-EPI 2021: >90 ML/MIN/1.73M2
ERYTHROCYTE [DISTWIDTH] IN BLOOD BY AUTOMATED COUNT: 12.6 % (ref 10–15)
GLUCOSE SERPL-MCNC: 108 MG/DL (ref 70–99)
GLUCOSE UR STRIP-MCNC: NEGATIVE MG/DL
HCG UR QL: NEGATIVE
HCO3 SERPL-SCNC: 25 MMOL/L (ref 22–29)
HCT VFR BLD AUTO: 39.2 % (ref 35–47)
HGB BLD-MCNC: 13.2 G/DL (ref 11.7–15.7)
HGB UR QL STRIP: NEGATIVE
INTERPRETATION ECG - MUSE: NORMAL
KETONES UR STRIP-MCNC: NEGATIVE MG/DL
LEUKOCYTE ESTERASE UR QL STRIP: NEGATIVE
LIPASE SERPL-CCNC: 27 U/L (ref 13–60)
MCH RBC QN AUTO: 31.9 PG (ref 26.5–33)
MCHC RBC AUTO-ENTMCNC: 33.7 G/DL (ref 31.5–36.5)
MCV RBC AUTO: 95 FL (ref 78–100)
NITRATE UR QL: NEGATIVE
P AXIS - MUSE: 1 DEGREES
PH UR STRIP: 7.5 [PH] (ref 5–7)
PLATELET # BLD AUTO: 337 10E3/UL (ref 150–450)
POTASSIUM SERPL-SCNC: 4.3 MMOL/L (ref 3.4–5.3)
PR INTERVAL - MUSE: 170 MS
PROT SERPL-MCNC: 7.6 G/DL (ref 6.4–8.3)
QRS DURATION - MUSE: 86 MS
QT - MUSE: 334 MS
QTC - MUSE: 424 MS
R AXIS - MUSE: -16 DEGREES
RBC # BLD AUTO: 4.14 10E6/UL (ref 3.8–5.2)
RBC URINE: 0 /HPF
SODIUM SERPL-SCNC: 138 MMOL/L (ref 135–145)
SP GR UR STRIP: 1 (ref 1–1.03)
SQUAMOUS EPITHELIAL: <1 /HPF
SYSTOLIC BLOOD PRESSURE - MUSE: NORMAL MMHG
T AXIS - MUSE: 31 DEGREES
TROPONIN T SERPL HS-MCNC: 6 NG/L
UROBILINOGEN UR STRIP-MCNC: NORMAL MG/DL
VENTRICULAR RATE- MUSE: 97 BPM
WBC # BLD AUTO: 6.2 10E3/UL (ref 4–11)
WBC URINE: <1 /HPF

## 2025-05-05 PROCEDURE — 250N000011 HC RX IP 250 OP 636: Mod: JZ | Performed by: EMERGENCY MEDICINE

## 2025-05-05 PROCEDURE — 85379 FIBRIN DEGRADATION QUANT: CPT | Performed by: EMERGENCY MEDICINE

## 2025-05-05 PROCEDURE — 85018 HEMOGLOBIN: CPT | Performed by: EMERGENCY MEDICINE

## 2025-05-05 PROCEDURE — 83690 ASSAY OF LIPASE: CPT | Performed by: EMERGENCY MEDICINE

## 2025-05-05 PROCEDURE — 36415 COLL VENOUS BLD VENIPUNCTURE: CPT | Performed by: EMERGENCY MEDICINE

## 2025-05-05 PROCEDURE — 250N000013 HC RX MED GY IP 250 OP 250 PS 637: Performed by: EMERGENCY MEDICINE

## 2025-05-05 PROCEDURE — 99285 EMERGENCY DEPT VISIT HI MDM: CPT | Mod: 25

## 2025-05-05 PROCEDURE — 71260 CT THORAX DX C+: CPT

## 2025-05-05 PROCEDURE — 250N000011 HC RX IP 250 OP 636: Performed by: EMERGENCY MEDICINE

## 2025-05-05 PROCEDURE — 93005 ELECTROCARDIOGRAM TRACING: CPT | Performed by: EMERGENCY MEDICINE

## 2025-05-05 PROCEDURE — 81001 URINALYSIS AUTO W/SCOPE: CPT | Performed by: EMERGENCY MEDICINE

## 2025-05-05 PROCEDURE — 96374 THER/PROPH/DIAG INJ IV PUSH: CPT

## 2025-05-05 PROCEDURE — 81025 URINE PREGNANCY TEST: CPT | Performed by: EMERGENCY MEDICINE

## 2025-05-05 PROCEDURE — 84484 ASSAY OF TROPONIN QUANT: CPT | Performed by: EMERGENCY MEDICINE

## 2025-05-05 PROCEDURE — 82248 BILIRUBIN DIRECT: CPT | Performed by: EMERGENCY MEDICINE

## 2025-05-05 RX ORDER — KETOROLAC TROMETHAMINE 15 MG/ML
15 INJECTION, SOLUTION INTRAMUSCULAR; INTRAVENOUS ONCE
Status: COMPLETED | OUTPATIENT
Start: 2025-05-05 | End: 2025-05-05

## 2025-05-05 RX ORDER — ONDANSETRON 2 MG/ML
4 INJECTION INTRAMUSCULAR; INTRAVENOUS ONCE
Status: COMPLETED | OUTPATIENT
Start: 2025-05-05 | End: 2025-05-05

## 2025-05-05 RX ORDER — LIDOCAINE 4 G/G
1 PATCH TOPICAL ONCE
Status: DISCONTINUED | OUTPATIENT
Start: 2025-05-05 | End: 2025-05-05 | Stop reason: HOSPADM

## 2025-05-05 RX ORDER — IOPAMIDOL 755 MG/ML
90 INJECTION, SOLUTION INTRAVASCULAR ONCE
Status: COMPLETED | OUTPATIENT
Start: 2025-05-05 | End: 2025-05-05

## 2025-05-05 RX ORDER — ACETAMINOPHEN 325 MG/1
975 TABLET ORAL ONCE
Status: COMPLETED | OUTPATIENT
Start: 2025-05-05 | End: 2025-05-05

## 2025-05-05 RX ADMIN — KETOROLAC TROMETHAMINE 15 MG: 15 INJECTION, SOLUTION INTRAMUSCULAR; INTRAVENOUS at 09:48

## 2025-05-05 RX ADMIN — LIDOCAINE 1 PATCH: 4 PATCH TOPICAL at 09:51

## 2025-05-05 RX ADMIN — ACETAMINOPHEN 975 MG: 325 TABLET, FILM COATED ORAL at 09:48

## 2025-05-05 RX ADMIN — IOPAMIDOL 90 ML: 755 INJECTION, SOLUTION INTRAVENOUS at 10:55

## 2025-05-05 ASSESSMENT — ACTIVITIES OF DAILY LIVING (ADL)
ADLS_ACUITY_SCORE: 56
ADLS_ACUITY_SCORE: 56

## 2025-05-05 ASSESSMENT — COLUMBIA-SUICIDE SEVERITY RATING SCALE - C-SSRS
1. IN THE PAST MONTH, HAVE YOU WISHED YOU WERE DEAD OR WISHED YOU COULD GO TO SLEEP AND NOT WAKE UP?: NO
6. HAVE YOU EVER DONE ANYTHING, STARTED TO DO ANYTHING, OR PREPARED TO DO ANYTHING TO END YOUR LIFE?: NO
2. HAVE YOU ACTUALLY HAD ANY THOUGHTS OF KILLING YOURSELF IN THE PAST MONTH?: NO

## 2025-05-05 NOTE — DISCHARGE INSTRUCTIONS
You are seen and evaluated here in the emergency department for your shoulder, chest and breast pain.  Fortunately, no signs of heart attack, arrhythmia, blood clot, pneumonia or other at this time.  There were no signs of abscess or significant mass on the CT however, if you continue to have this pain I would recommend close follow-up with primary care later this week or early next week for recheck.  If you develop severe worsening symptoms especially coughing up any blood, you pass out, have significant difficulty breathing, fevers or other concerns please return.  Otherwise, close follow-up with primary care in the next 3-5 days.

## 2025-05-05 NOTE — ED PROVIDER NOTES
EMERGENCY DEPARTMENT ENCOUNTER      NAME: Yahaira Ramirez  AGE: 50 year old female  YOB: 1975  MRN: [id number]  EVALUATION DATE & TIME: No admission date for patient encounter.    PCP: Zheng Way    ED PROVIDER: Juany Cole PA-C      Chief Complaint   Patient presents with    Breast Pain    Shoulder Pain         FINAL IMPRESSION:  1. Left shoulder pain    2. Chest pain    3. Breast pain          MEDICAL DECISION MAKING:    Pertinent Labs & Imaging studies reviewed. (See chart for details)  50 year old female presents to the Emergency Department for evaluation of left shoulder/breast pain that started 2 days ago.  On my evaluation, patient slightly hypertensive at 176/95 but otherwise vitally stable.  Examination with heart in regular rate and rhythm and lungs are auscultation bilaterally. 2+ radial pulses, tenderness to the left posterior shoulder along the inferior aspect of the shoulder blade, no palpable spasm appreciated. Normal range of motion of the left shoulder.  Differential diagnosis included musculoskeletal pain, PE, ACS, pneumonia, pneumothorax, kidney stone.    Patient had tenderness to palpation of her left shoulder blade inferiorly along the musculature and high suspicion for musculoskeletal origin of her pain however, with her risk factors and age do have concerns for PE, ACS, pneumonia as well as kidney stone.  No significant CVA tenderness but will obtain UA as well as urine pregnancy.  Patient is on Mirena IUD and has a history of cancer with intermittent tachycardia here in the emergency department.  Concern for possible PE and will obtain a D-dimer.  EKG was performed and is in normal sinus rhythm without any acute ST or T wave changes concerning for ACS.  Troponin ordered.  Will also order CBC, BMP, LFTs, lipase.  At this time, for pain control will give Toradol, Tylenol and lidocaine patch.  Will also obtain imaging of the chest/abdomen/pelvis pending  laboratory evaluation above.    Fortunately, troponin is negative at 6, she has been having 48 hours of chest pain that has been constant and current chest pain at this time and with a troponin of 6 low suspicion for ACS with reassuring EKG and do not feel repeat troponin is indicated at this time.  D-dimer negative and do not feel CT PE study is warranted.  CBC, BMP, LFTs and lipase without significant derangement.  At this time, will order a CT of the chest with contrast to assess for any infectious process of the breast or lung abnormalities causing the pain.  Rechecked patient after medications given here and she has no significant change in symptoms.    At this time, discussed negative CT imaging and patient reassured.  Unclear exact cause of symptoms however, again suspicious that this is musculoskeletal in nature.  Recommended close follow-up with primary care in 3 to 5 days for recheck if not improving as well as reasons to return.  Patient in agreement understand with the plan of care.  At this time, I do feel she is appropriate and stable for discharge home with close outpatient follow-up although admission was considered.  Questions answered the best my ability and she was discharged home in stable condition.    Medical Decision Making  Discharge. No recommendations on prescription strength medication(s). See documentation for any additional details.    MIPS (CTPE, Dental pain, [place], Sinusitis, Asthma/COPD, Head Trauma): Not Applicable    SEPSIS: None         ED COURSE:  9:06 AM I met with the patient, obtained history, performed an initial exam, and discussed options and plan for diagnostics and treatment here in the ED.    10:48 AM Rechecked patient updated on results thus far.  No significant change in symptoms after medications.    11:21 AM I independently interpreted CT of the chest which does not show any obvious breast abscess, lung mass, focal consolidation of the lung.    11:59 AM Patient  discharged after being provided with extensive anticipatory guidance and given return precautions, importance of PCP follow-up emphasized.    At the conclusion of the encounter I discussed the results of all of the tests and the disposition. The questions were answered. The patient or family acknowledged understanding and was agreeable with the care plan.     MEDICATIONS GIVEN IN THE EMERGENCY:  Medications   Lidocaine (LIDOCARE) 4 % Patch 1 patch (1 patch Transdermal $Patch/Med Applied 5/5/25 0951)   ketorolac (TORADOL) injection 15 mg (15 mg Intravenous $Given 5/5/25 0948)   acetaminophen (TYLENOL) tablet 975 mg (975 mg Oral $Given 5/5/25 0948)   ondansetron (ZOFRAN) injection 4 mg (4 mg Intravenous Not Given 5/5/25 0954)   iopamidol (ISOVUE-370) solution 90 mL (90 mLs Intravenous $Given 5/5/25 1055)       NEW PRESCRIPTIONS STARTED AT TODAY'S ER VISIT  New Prescriptions    No medications on file            =================================================================    HPI:    Patient information was obtained from: The patient    Use of Interpretor: N/A          Yahaira Ramirez is a 50 year old female with a pertinent history of asthma, hypothyroidism, rectal cancer who presents to this ED via private vehicle for evaluation of left breast/shoulder pain.  2 days ago the patient started to develop left upper back/shoulder pain that started to radiate into her chest yesterday.  She took ibuprofen without improvement of symptoms and her pain continued today which was concerning to her so she presented to the ER.  On my evaluation, patient denies any fevers, chills, cough, shortness of breath, leg pain or swelling.  She is not having any abdominal pain however, reports having some nausea without vomiting or diarrhea.  No urinary symptoms including burning with urination, urgency, blood in her urine.  She reports that nothing seems to make the pain better or worse including movement or exertion.  Patient has a  history of colon rectal cancer which metastasized to the lungs in 2023 status post resection of a portion of her left lung during that time.  Since then, patient has been cancer free and followed up with oncology just this past Friday without any new concerns.  Patient reports she has never had pain like this before.  No other concerns voiced.      REVIEW OF SYSTEMS:  Negative unless otherwise stated in the above HPI.       PAST MEDICAL HISTORY:  Past Medical History:   Diagnosis Date    ASCUS on Pap smear 1/23/03    + HPV    Asthma     ASTHMA - MILD INTERMITTENT 8/23/2005    Calculus of gallbladder without mention of cholecystitis or obstruction     CARDIOVASCULAR SCREENING; LDL GOAL LESS THAN 160 10/31/2010    Colon cancer (H)     chemo and radiation therapy started january 9 until february 11, 2017    Depressive disorder 2/22/2011    History of colposcopy with cervical biopsy 2/20/03, 7/23/03    SIMONE I & III, SIMONE I    HUMAN PAPILLOMAVIRUS NOS 3/24/2003    61-low risk    HYPOTHYROIDISM NOS 9/10/2002    Intractable nausea and vomiting 1/1/2024    Mild major depression 2/22/2011    Other postablative hypothyroidism     iatrogenically induced hypothyroidism after I-31 for hyperthyroidism   abstracted [id number]    Rectal cancer (H) 4/5/2017    Renal stones     HX of       PAST SURGICAL HISTORY:  Past Surgical History:   Procedure Laterality Date    ABDOMEN SURGERY  4/5/17    Colon resection    C IUD,MIRENA  4/2016    CHOLECYSTECTOMY      COLONOSCOPY Left 12/9/2016    Procedure: COMBINED COLONOSCOPY, SINGLE OR MULTIPLE BIOPSY/POLYPECTOMY BY BIOPSY;  Surgeon: Claudette De La Paz MD;  Location:  GI    COLONOSCOPY N/A 8/14/2017    Procedure: COLONOSCOPY;  COLONOSCOPY ;  Surgeon: Yair Adkins MD;  Location: [facility]    COLONOSCOPY N/A 6/1/2020    Procedure: COLONOSCOPY, WITH POLYPECTOMY AND BIOPSY;  Surgeon: Yair Adkins MD;  Location: [facility]    DAVINCI COLECTOMY N/A 4/5/2017    Procedure: DAVINCI XI COLECTOMY;   Surgeon: Yair Adkins MD;  Location:  OR    ENT SURGERY  03/2016    Tonsilectomy    EXCISE MASS UPPER EXTREMITY  12/21/2012    Procedure: EXCISE MASS UPPER EXTREMITY;  Excision Subcutaneous Mass Right Tricep;  Surgeon: Mehul Maldonado MD;  Location:  OR    EYE SURGERY      Lasik    HC CAUTERY OF CERVIX, CRYOCAUTERY  3/23/03    IR LUNG BIOPSY MEDIASTINUM LEFT  10/25/2023    MAMMOPLASTY REDUCTION BILATERAL  7/3/2014    Procedure: MAMMOPLASTY REDUCTION BILATERAL;  Surgeon: Yoanna Mccabe MD;  Location:  SD    SIGMOIDOSCOPY FLEXIBLE N/A 3/21/2017    Procedure: SIGMOIDOSCOPY FLEXIBLE;  Surgeon: Yair Adkins MD;  Location:  GI    TAKEDOWN ILEOSTOMY N/A 9/6/2017    Procedure: TAKEDOWN ILEOSTOMY;  OPEN ILEOSTOMY REVERSAL ;  Surgeon: Yair Adkins MD;  Location:  OR           CURRENT MEDICATIONS:      Current Facility-Administered Medications:     Lidocaine (LIDOCARE) 4 % Patch 1 patch, 1 patch, Transdermal, Once, Juany Lin PA-C, 1 patch at 05/05/25 0995    Current Outpatient Medications:     acetaminophen (TYLENOL) 500 MG tablet, Take 1,000 mg by mouth every 6 hours as needed for mild pain, Disp: , Rfl:     ADVAIR DISKUS 250-50 MCG/ACT inhaler, Inhale 1 puff into the lungs 2 times daily, Disp: , Rfl:     albuterol (PROAIR HFA/PROVENTIL HFA/VENTOLIN HFA) 108 (90 Base) MCG/ACT inhaler, Inhale 1-2 puffs into the lungs every 4 hours as needed for shortness of breath, Disp: , Rfl:     ascorbic acid 1000 MG TABS tablet, Take 2,000 mg by mouth at bedtime, Disp: , Rfl:     buPROPion (WELLBUTRIN XL) 300 MG 24 hr tablet, Take 300 mg by mouth every morning, Disp: , Rfl:     Elderberry 500 MG CAPS, Take 1,000 mg by mouth daily, Disp: , Rfl:     fish oil-omega-3 fatty acids 1000 MG capsule, Take 1 g by mouth at bedtime, Disp: , Rfl:     fish oil-omega-3 fatty acids 1000 MG capsule, Take 2 g by mouth daily, Disp: , Rfl:     levothyroxine (SYNTHROID) 137 MCG tablet, Take 1 tablet (137 mcg) by mouth  every morning, Disp: 90 tablet, Rfl: 3    LORazepam (ATIVAN) 0.5 MG tablet, Take 0.5 mg by mouth every 4 hours as needed for nausea, Disp: , Rfl:     meclizine (ANTIVERT) 12.5 MG tablet, Take 1 tablet (12.5 mg) by mouth every 6 hours as needed for nausea, Disp: 12 tablet, Rfl: 0    OLANZapine zydis (ZYPREXA) 5 MG ODT, Take 1 tablet (5 mg) by mouth daily for 4 days, Disp: 8 tablet, Rfl: 0    ondansetron (ZOFRAN ODT) 8 MG ODT tab, Take 8 mg by mouth every 8 hours as needed for nausea, Disp: , Rfl:     prochlorperazine (COMPAZINE) 5 MG tablet, Take 5-10 mg by mouth every 6 hours as needed for nausea or vomiting, Disp: , Rfl:     psyllium (METAMUCIL/KONSYL) capsule, Take 1 capsule by mouth daily, Disp: , Rfl:     sertraline (ZOLOFT) 100 MG tablet, Take 50 mg by mouth daily, Disp: , Rfl:     spironolactone (ALDACTONE) 100 MG tablet, Take 100 mg by mouth daily, Disp: , Rfl:     vitamin D3 (CHOLECALCIFEROL) 125 MCG (5000 UT) tablet, Take 5,000 Units by mouth daily, Disp: , Rfl:       ALLERGIES:  Allergies   Allergen Reactions    Amoxicillin Shortness Of Breath    Clavulanic Acid Shortness Of Breath    Sulfa Antibiotics Shortness Of Breath and Hives    Adhesive Tape Itching and Rash    Cats     Meperidine Nausea and Vomiting    Pollen Extract        FAMILY HISTORY:  Family History   Problem Relation Age of Onset    Cancer Maternal Grandfather         -lung    Cancer Paternal Grandfather             Hypertension Father     Diabetes Father 60        type 2    Lipids Maternal Grandmother     Cancer Paternal Grandmother         pancreatic    Hypertension Mother     Colon Cancer No family hx of        SOCIAL HISTORY:   Social History     Socioeconomic History    Marital status: Single   Tobacco Use    Smoking status: Former     Current packs/day: 0.00     Average packs/day: 1 pack/day for 13.0 years (13.0 ttl pk-yrs)     Types: Cigarettes     Start date: 1993     Quit date: 2006     Years since  "quittin.4    Smokeless tobacco: Never   Substance and Sexual Activity    Alcohol use: No     Alcohol/week: 0.0 standard drinks of alcohol     Comment: rare    Drug use: No    Sexual activity: Not Currently     Partners: Male     Birth control/protection: I.U.D.   Other Topics Concern    Parent/sibling w/ CABG, MI or angioplasty before 65F 55M? No     Social Drivers of Health     Financial Resource Strain: Low Risk  (2024)    [facility]    Financial Resource Strain     Difficulty of Paying Living Expenses: 3   Food Insecurity: No Food Insecurity (2024)    [facility]    Food Insecurity     Do you worry your food will run out before you are able to buy more?: 1   Transportation Needs: No Transportation Needs (2024)    Received from [employer] [facility]    Transportation Needs     Does lack of transportation keep you from medical appointments?: 1     Does lack of transportation keep you from work, meetings or getting things that you need?: 1   Social Connections: Socially Integrated (2024)    [facility]    Social Connections     Do you often feel lonely or isolated from those around you?: 0   Housing Stability: Low Risk  (2024)    [facility]    Housing Stability     What is your housing situation today?: 1       VITALS:  Patient Vitals for the past 24 hrs:   BP Temp Temp src Pulse Resp SpO2 Height Weight   25 1028 (!) 147/78 -- -- 86 22 95 % -- --   25 1000 (!) 144/77 -- -- 93 22 96 % -- --   25 0906 (!) 176/95 97.5  F (36.4  C) Temporal 96 18 100 % 1.651 m (5' 5\") 113.4 kg (250 lb)       PHYSICAL EXAM    Constitutional: Well developed, Well nourished, NAD  HENT: Normocephalic, Atraumatic, Bilateral external ears normal, Oropharynx normal, mucous membranes moist, " Nose normal.   Neck: Normal range of motion, No tenderness, Supple, No stridor.  Eyes: Eyes track normally throughout exam, Conjunctiva normal, No discharge.   Respiratory: Normal breath sounds, No respiratory distress, No wheezing, Speaks full sentences easily. No cough.  Cardiovascular: Normal heart rate, Regular rhythm, No murmurs, No rubs, No gallops. Chest wall nontender.  GI: Soft, No tenderness, No masses, No flank tenderness. No rebound or guarding.  Musculoskeletal: Good range of motion in all major joints. 2+ radial pulses, tenderness to the left posterior shoulder along the inferior aspect of the shoulder blade, no palpable spasm appreciated. Normal range of motion of the left shoulder.   Integument: Warm, Dry, No erythema, No rash. No petechiae.  Neurologic: Alert & oriented x 3, Normal motor function, Normal sensory function, No focal deficits noted. Normal gait.  Psychiatric: Affect normal, Judgment normal, Mood normal. Cooperative.    EKG:  EKG independently interpreted by myself and Dr. Aguila.  Ventricular rate 97, QT/QTc 334/424.  Sinus rhythm with minimal voltage criteria for LVH, anterior infarct cited on or before 31-December-2023 which is unchanged from prior EKG performed on 01-January-2024    LAB:  All pertinent labs reviewed and interpreted.  Recent Results (from the past 24 hours)   ECG 12-LEAD WITH MUSE (E)    Collection Time: 05/05/25  9:15 AM   Result Value Ref Range    Systolic Blood Pressure  mmHg    Diastolic Blood Pressure  mmHg    Ventricular Rate 97 BPM    Atrial Rate 97 BPM    MT Interval 170 ms    QRS Duration 86 ms     ms    QTc 424 ms    P Axis 1 degrees    R AXIS -16 degrees    T Axis 31 degrees    Interpretation ECG       Sinus rhythm  Minimal voltage criteria for LVH, may be normal variant  Anterior infarct (cited on or before 31-Dec-2023)  Abnormal ECG  When compared with ECG of 01-Jan-2024 16:07,  No significant change was found  Confirmed by SEE ED PROVIDER NOTE  FOR, ECG INTERPRETATION (4000),  TYSHAWN GUADARRAMA ([id number]) on 5/5/2025 9:34:06 AM     CBC (+ platelets, no diff)    Collection Time: 05/05/25  9:46 AM   Result Value Ref Range    WBC Count 6.2 4.0 - 11.0 10e3/uL    RBC Count 4.14 3.80 - 5.20 10e6/uL    Hemoglobin 13.2 11.7 - 15.7 g/dL    Hematocrit 39.2 35.0 - 47.0 %    MCV 95 78 - 100 fL    MCH 31.9 26.5 - 33.0 pg    MCHC 33.7 31.5 - 36.5 g/dL    RDW 12.6 10.0 - 15.0 %    Platelet Count 337 150 - 450 10e3/uL   Basic metabolic panel    Collection Time: 05/05/25  9:46 AM   Result Value Ref Range    Sodium 138 135 - 145 mmol/L    Potassium 4.3 3.4 - 5.3 mmol/L    Chloride 102 98 - 107 mmol/L    Carbon Dioxide (CO2) 25 22 - 29 mmol/L    Anion Gap 11 7 - 15 mmol/L    Urea Nitrogen 15.0 6.0 - 20.0 mg/dL    Creatinine 0.78 0.51 - 0.95 mg/dL    GFR Estimate >90 >60 mL/min/1.73m2    Calcium 10.5 (H) 8.8 - 10.4 mg/dL    Glucose 108 (H) 70 - 99 mg/dL   Hepatic function panel    Collection Time: 05/05/25  9:46 AM   Result Value Ref Range    Protein Total 7.6 6.4 - 8.3 g/dL    Albumin 4.6 3.5 - 5.2 g/dL    Bilirubin Total 0.5 <=1.2 mg/dL    Alkaline Phosphatase 105 40 - 150 U/L    AST 18 0 - 45 U/L    ALT 19 0 - 50 U/L    Bilirubin Direct 0.20 0.00 - 0.30 mg/dL   Lipase    Collection Time: 05/05/25  9:46 AM   Result Value Ref Range    Lipase 27 13 - 60 U/L   D dimer quantitative    Collection Time: 05/05/25  9:46 AM   Result Value Ref Range    D-Dimer Quantitative 0.34 0.00 - 0.50 ug/mL FEU   Troponin T, High Sensitivity    Collection Time: 05/05/25  9:46 AM   Result Value Ref Range    Troponin T, High Sensitivity 6 <=14 ng/L   UA with Microscopic reflex to Culture    Collection Time: 05/05/25 10:20 AM    Specimen: Urine, Clean Catch   Result Value Ref Range    Color Urine Colorless Colorless, Straw, Light Yellow, Yellow    Appearance Urine Clear Clear    Glucose Urine Negative Negative mg/dL    Bilirubin Urine Negative Negative    Ketones Urine Negative Negative mg/dL     Specific Gravity Urine 1.004 1.001 - 1.030    Blood Urine Negative Negative    pH Urine 7.5 (H) 5.0 - 7.0    Protein Albumin Urine Negative Negative mg/dL    Urobilinogen Urine Normal Normal mg/dL    Nitrite Urine Negative Negative    Leukocyte Esterase Urine Negative Negative    RBC Urine 0 <=2 /HPF    WBC Urine <1 <=5 /HPF    Squamous Epithelials Urine <1 <=1 /HPF   HCG qualitative urine    Collection Time: 05/05/25 10:20 AM   Result Value Ref Range    hCG Urine Qualitative Negative Negative         RADIOLOGY:  Reviewed all pertinent imaging. Please see official radiology report.  CT Chest w Contrast   Final Result   IMPRESSION:       1.  Status post left upper lobe wedge resection. Minimal, stable cicatrization atelectasis adjacent to the staple line.   2.  No imaging findings that would help explain left chest wall/breast pain.          PROCEDURES:   None.     Juany Cole PA-C  Emergency Medicine  [facility]  5/5/2025      Juany Lin PA-C  05/05/25 1153

## 2025-05-05 NOTE — ED TRIAGE NOTES
Pt presents to the ED with c/o left breast pain and left shoulder blade pain worsened today. Hx of colon cancer with mets to lungs - pt s/p left lung resection in (2023). Pt also feeling nauseated. Pt states as of now the cancer is gone.

## 2025-07-27 ENCOUNTER — HEALTH MAINTENANCE LETTER (OUTPATIENT)
Age: 50
End: 2025-07-27

## (undated) DEVICE — OSTOMY ADHESIVE BARRIER 4X4"

## (undated) DEVICE — ESU GROUND PAD UNIVERSAL W/O CORD

## (undated) DEVICE — DAVINCI XI SEAL UNIVERSAL 5-8MM 470361

## (undated) DEVICE — DAVINCI XI FCP CADIERE 470049

## (undated) DEVICE — LINEN TOWEL PACK X5 5464

## (undated) DEVICE — Device

## (undated) DEVICE — SUCTION IRR TRUMPET VALVE LAP ASU1201

## (undated) DEVICE — GLOVE PROTEXIS BLUE W/NEU-THERA 7.5  2D73EB75

## (undated) DEVICE — DAVINCI XI RETR ENDOWRIST SMALL GRAPTOR 470318

## (undated) DEVICE — SU VICRYL 2-0 SH 27" J317H

## (undated) DEVICE — ESU ELEC BLADE 6" COATED E1450-6

## (undated) DEVICE — DRAIN JACKSON PRATT CHANNEL 19FR ROUND HUBLESS SIL JP-2230

## (undated) DEVICE — DRAPE SHEET REV FOLD 3/4 9349

## (undated) DEVICE — DEVICE SUTURE GRASPER TROCAR CLOSURE 14GA PMITCSG

## (undated) DEVICE — CATH TRAY FOLEY SURESTEP 16FR WDRAIN BAG STLK LATEX A300316A

## (undated) DEVICE — DAVINCI TROCAR 8MM BLADELESS 470357

## (undated) DEVICE — KIT PATIENT POSITIONING PIGAZZI LATEX FREE 40580

## (undated) DEVICE — SU PDS II 0 CTX 60" Z990G

## (undated) DEVICE — PREP CHLORAPREP 26ML TINTED ORANGE  260815

## (undated) DEVICE — DAVINCI SEAL CANNULA AND STPL 12MM 470380

## (undated) DEVICE — OSTOMY BAG CLAMP 8770

## (undated) DEVICE — DRAPE LAP W/ARMBOARD 29410

## (undated) DEVICE — DAVINCI ENDOWRIST STAPLER 45 SHEATH 410370

## (undated) DEVICE — DRSG KERLIX FLUFFS X5

## (undated) DEVICE — SOL NACL 0.9% IRRIG 1000ML BOTTLE 07138-09

## (undated) DEVICE — SU MONOCRYL 4-0 PS-2 18" UND Y496G

## (undated) DEVICE — SU PROLENE 2-0 SHDA 48" 8533H

## (undated) DEVICE — DAVINCI XI DRAPE COLUMN 470341

## (undated) DEVICE — ENDO TROCAR CONMED AIRSEAL BLADELESS 05X120MM IAS5-120LP

## (undated) DEVICE — SU VICRYL 3-0 SH CR 8X18" J774

## (undated) DEVICE — GLOVE PROTEXIS BLUE W/NEU-THERA 8.0  2D73EB80

## (undated) DEVICE — BLADE KNIFE SURG 10 371110

## (undated) DEVICE — LIGHT HANDLE X2

## (undated) DEVICE — DRAIN JACKSON PRATT 07FR ROUND SU130-1320

## (undated) DEVICE — TUBING SUCTION 12"X1/4" N612

## (undated) DEVICE — GOWN IMPERVIOUS ZONED XLG 9041

## (undated) DEVICE — STPL LINEAR 90 X 3.5MM TA9035S

## (undated) DEVICE — SUCTION CANISTER MEDIVAC LINER 3000ML W/LID 65651-530

## (undated) DEVICE — DAVINCI XI ENDOWRIST STAPLER RELOAD 45MM GREEN 48445G

## (undated) DEVICE — SYSTEM CLEARIFY VISUALIZATION 21-345

## (undated) DEVICE — NDL INSUFFLATION 120MM VERRES 172015

## (undated) DEVICE — DAVINCI HOT SHEARS TIP COVER  400180

## (undated) DEVICE — TAPE CLOTH ADHESIVE 3" LATEX FREE

## (undated) DEVICE — SU VICRYL 0 TIE 12X18" J906G

## (undated) DEVICE — ESU ELEC BLADE 2.75" COATED/INSULATED E1455

## (undated) DEVICE — GLOVE PROTEXIS W/NEU-THERA 7.5  2D73TE75

## (undated) DEVICE — TUBING CONMED AIRSEAL SMOKE EVAC INSUFFLATION ASM-EVAC

## (undated) DEVICE — GLOVE PROTEXIS BLUE W/NEU-THERA 6.5  2D73EB65

## (undated) DEVICE — SU DERMABOND ADVANCED .7ML DNX12

## (undated) DEVICE — DAVINCI XI GRASPER FENESTRATED TIP UP 8MM 470347

## (undated) DEVICE — DAVINCI XI VESSEL SEALER 480322

## (undated) DEVICE — DRAPE BREAST/CHEST 29420

## (undated) DEVICE — DAVINCI XI FCP BIPOLAR FENESTRATED 470205

## (undated) DEVICE — SU ETHILON 2-0 FS 18" 664H

## (undated) DEVICE — STPL 80 X 3.8MM GIA8038S

## (undated) DEVICE — LUBRICATING JELLY 4.25OZ

## (undated) DEVICE — PACK DAVINCI UROLOGY SBA15UDFSG

## (undated) DEVICE — DAVINCI XI MONOPOLAR SCISSORS HOT SHEARS 8MM 470179

## (undated) DEVICE — DAVINCI XI REDUCER 8-12MM 470381

## (undated) DEVICE — GLOVE PROTEXIS MICRO 6.5  2D73PM65

## (undated) DEVICE — GOWN IMPERVIOUS SPECIALTY XL/XLONG 39049

## (undated) DEVICE — SPONGE BALL KERLIX ROUND XL W/O STRING LATEX 4935

## (undated) DEVICE — SYR BULB IRRIG 50ML LATEX FREE 0035280

## (undated) DEVICE — STPL CIRCULAR DST 28MM W/TILT TIP EEA28

## (undated) DEVICE — PACK MAJOR SBA15MAFSI

## (undated) DEVICE — SOL NACL 0.9% INJ 1000ML BAG 2B1324X

## (undated) DEVICE — DAVINCI XI DRAPE ARM 470015

## (undated) RX ORDER — HEPARIN SODIUM 5000 [USP'U]/.5ML
INJECTION, SOLUTION INTRAVENOUS; SUBCUTANEOUS
Status: DISPENSED
Start: 2017-04-05

## (undated) RX ORDER — FENTANYL CITRATE 50 UG/ML
INJECTION, SOLUTION INTRAMUSCULAR; INTRAVENOUS
Status: DISPENSED
Start: 2018-04-09

## (undated) RX ORDER — CIPROFLOXACIN 2 MG/ML
INJECTION, SOLUTION INTRAVENOUS
Status: DISPENSED
Start: 2017-09-06

## (undated) RX ORDER — GLYCOPYRROLATE 0.2 MG/ML
INJECTION, SOLUTION INTRAMUSCULAR; INTRAVENOUS
Status: DISPENSED
Start: 2017-09-06

## (undated) RX ORDER — ONDANSETRON 2 MG/ML
INJECTION INTRAMUSCULAR; INTRAVENOUS
Status: DISPENSED
Start: 2017-09-06

## (undated) RX ORDER — HYDROMORPHONE HYDROCHLORIDE 1 MG/ML
INJECTION, SOLUTION INTRAMUSCULAR; INTRAVENOUS; SUBCUTANEOUS
Status: DISPENSED
Start: 2017-09-06

## (undated) RX ORDER — CIPROFLOXACIN 2 MG/ML
INJECTION, SOLUTION INTRAVENOUS
Status: DISPENSED
Start: 2017-04-05

## (undated) RX ORDER — FENTANYL CITRATE 50 UG/ML
INJECTION, SOLUTION INTRAMUSCULAR; INTRAVENOUS
Status: DISPENSED
Start: 2017-09-06

## (undated) RX ORDER — PROPOFOL 10 MG/ML
INJECTION, EMULSION INTRAVENOUS
Status: DISPENSED
Start: 2017-09-06

## (undated) RX ORDER — DEXAMETHASONE SODIUM PHOSPHATE 4 MG/ML
INJECTION, SOLUTION INTRA-ARTICULAR; INTRALESIONAL; INTRAMUSCULAR; INTRAVENOUS; SOFT TISSUE
Status: DISPENSED
Start: 2017-09-06

## (undated) RX ORDER — BUPIVACAINE HYDROCHLORIDE 5 MG/ML
INJECTION, SOLUTION EPIDURAL; INTRACAUDAL
Status: DISPENSED
Start: 2017-04-05

## (undated) RX ORDER — ALBUTEROL SULFATE 90 UG/1
AEROSOL, METERED RESPIRATORY (INHALATION)
Status: DISPENSED
Start: 2017-09-06

## (undated) RX ORDER — ALVIMOPAN 12 MG/1
CAPSULE ORAL
Status: DISPENSED
Start: 2017-09-06

## (undated) RX ORDER — LIDOCAINE HYDROCHLORIDE 20 MG/ML
INJECTION, SOLUTION EPIDURAL; INFILTRATION; INTRACAUDAL; PERINEURAL
Status: DISPENSED
Start: 2017-04-05

## (undated) RX ORDER — FENTANYL CITRATE 50 UG/ML
INJECTION, SOLUTION INTRAMUSCULAR; INTRAVENOUS
Status: DISPENSED
Start: 2017-04-05

## (undated) RX ORDER — ACETAMINOPHEN 500 MG
TABLET ORAL
Status: DISPENSED
Start: 2017-04-05

## (undated) RX ORDER — ACETAMINOPHEN 325 MG/1
TABLET ORAL
Status: DISPENSED
Start: 2017-09-06

## (undated) RX ORDER — FENTANYL CITRATE 50 UG/ML
INJECTION, SOLUTION INTRAMUSCULAR; INTRAVENOUS
Status: DISPENSED
Start: 2020-06-01

## (undated) RX ORDER — BUPIVACAINE HYDROCHLORIDE 5 MG/ML
INJECTION, SOLUTION EPIDURAL; INTRACAUDAL
Status: DISPENSED
Start: 2017-09-06

## (undated) RX ORDER — PROPOFOL 10 MG/ML
INJECTION, EMULSION INTRAVENOUS
Status: DISPENSED
Start: 2017-04-05

## (undated) RX ORDER — DEXAMETHASONE SODIUM PHOSPHATE 4 MG/ML
INJECTION, SOLUTION INTRA-ARTICULAR; INTRALESIONAL; INTRAMUSCULAR; INTRAVENOUS; SOFT TISSUE
Status: DISPENSED
Start: 2017-04-05

## (undated) RX ORDER — LIDOCAINE HYDROCHLORIDE 20 MG/ML
INJECTION, SOLUTION EPIDURAL; INFILTRATION; INTRACAUDAL; PERINEURAL
Status: DISPENSED
Start: 2017-09-06

## (undated) RX ORDER — ACETAMINOPHEN 10 MG/ML
INJECTION, SOLUTION INTRAVENOUS
Status: DISPENSED
Start: 2017-04-05

## (undated) RX ORDER — VECURONIUM BROMIDE 1 MG/ML
INJECTION, POWDER, LYOPHILIZED, FOR SOLUTION INTRAVENOUS
Status: DISPENSED
Start: 2017-09-06